# Patient Record
Sex: MALE | Race: WHITE | NOT HISPANIC OR LATINO
[De-identification: names, ages, dates, MRNs, and addresses within clinical notes are randomized per-mention and may not be internally consistent; named-entity substitution may affect disease eponyms.]

---

## 2017-01-10 ENCOUNTER — APPOINTMENT (OUTPATIENT)
Dept: CARDIOLOGY | Facility: CLINIC | Age: 57
End: 2017-01-10

## 2017-01-10 ENCOUNTER — APPOINTMENT (OUTPATIENT)
Dept: INTERNAL MEDICINE | Facility: CLINIC | Age: 57
End: 2017-01-10

## 2017-01-10 VITALS
TEMPERATURE: 97.5 F | OXYGEN SATURATION: 98 % | WEIGHT: 246 LBS | DIASTOLIC BLOOD PRESSURE: 81 MMHG | HEIGHT: 71.5 IN | SYSTOLIC BLOOD PRESSURE: 121 MMHG | HEART RATE: 93 BPM | RESPIRATION RATE: 12 BRPM | BODY MASS INDEX: 33.69 KG/M2

## 2017-01-10 VITALS — SYSTOLIC BLOOD PRESSURE: 110 MMHG | DIASTOLIC BLOOD PRESSURE: 60 MMHG

## 2017-01-10 DIAGNOSIS — Z00.00 ENCOUNTER FOR GENERAL ADULT MEDICAL EXAMINATION W/OUT ABNORMAL FINDINGS: ICD-10-CM

## 2017-01-10 DIAGNOSIS — Z91.89 OTHER SPECIFIED PERSONAL RISK FACTORS, NOT ELSEWHERE CLASSIFIED: ICD-10-CM

## 2017-01-11 LAB
25(OH)D3 SERPL-MCNC: 10.8 NG/ML
GLUCOSE BS SERPL-MCNC: 124 MG/DL
HBA1C MFR BLD HPLC: 7.1 %
TSH SERPL-ACNC: 2.14 UIU/ML

## 2017-01-18 LAB
PSA FREE FLD-MCNC: 23.1 %
PSA FREE SERPL-MCNC: 0.23 NG/ML
PSA SERPL-MCNC: 0.99 NG/ML

## 2017-02-21 ENCOUNTER — APPOINTMENT (OUTPATIENT)
Age: 57
End: 2017-02-21

## 2017-02-21 VITALS
HEART RATE: 98 BPM | WEIGHT: 247 LBS | HEIGHT: 71.5 IN | OXYGEN SATURATION: 97 % | TEMPERATURE: 98.5 F | DIASTOLIC BLOOD PRESSURE: 81 MMHG | SYSTOLIC BLOOD PRESSURE: 117 MMHG | BODY MASS INDEX: 33.82 KG/M2 | RESPIRATION RATE: 12 BRPM

## 2017-02-21 DIAGNOSIS — R94.31 ABNORMAL ELECTROCARDIOGRAM [ECG] [EKG]: ICD-10-CM

## 2017-02-21 DIAGNOSIS — E55.9 VITAMIN D DEFICIENCY, UNSPECIFIED: ICD-10-CM

## 2017-03-21 ENCOUNTER — APPOINTMENT (OUTPATIENT)
Dept: INTERNAL MEDICINE | Facility: CLINIC | Age: 57
End: 2017-03-21

## 2017-03-21 VITALS
TEMPERATURE: 98.3 F | OXYGEN SATURATION: 95 % | HEART RATE: 100 BPM | SYSTOLIC BLOOD PRESSURE: 123 MMHG | BODY MASS INDEX: 34.51 KG/M2 | RESPIRATION RATE: 12 BRPM | HEIGHT: 71.5 IN | WEIGHT: 252 LBS | DIASTOLIC BLOOD PRESSURE: 80 MMHG

## 2017-05-23 ENCOUNTER — APPOINTMENT (OUTPATIENT)
Dept: INTERNAL MEDICINE | Facility: CLINIC | Age: 57
End: 2017-05-23

## 2018-11-08 ENCOUNTER — APPOINTMENT (OUTPATIENT)
Dept: INTERNAL MEDICINE | Facility: CLINIC | Age: 58
End: 2018-11-08
Payer: COMMERCIAL

## 2018-11-08 VITALS
WEIGHT: 249 LBS | SYSTOLIC BLOOD PRESSURE: 120 MMHG | TEMPERATURE: 97.9 F | OXYGEN SATURATION: 94 % | DIASTOLIC BLOOD PRESSURE: 79 MMHG | BODY MASS INDEX: 34.1 KG/M2 | HEART RATE: 94 BPM | RESPIRATION RATE: 13 BRPM | HEIGHT: 71.5 IN

## 2018-11-08 DIAGNOSIS — R07.89 OTHER CHEST PAIN: ICD-10-CM

## 2018-11-08 PROCEDURE — 99213 OFFICE O/P EST LOW 20 MIN: CPT

## 2018-11-08 NOTE — PHYSICAL EXAM
[No Acute Distress] : no acute distress [Well Nourished] : well nourished [Well Developed] : well developed [Normal Sclera/Conjunctiva] : normal sclera/conjunctiva [PERRL] : pupils equal round and reactive to light [No JVD] : no jugular venous distention [No Respiratory Distress] : no respiratory distress  [Normal Rate] : normal rate  [Regular Rhythm] : with a regular rhythm [No Edema] : there was no peripheral edema [Soft] : abdomen soft [Non Tender] : non-tender

## 2018-11-08 NOTE — ASSESSMENT
[FreeTextEntry1] : ACUTE VISIT OF 58 Y OLD MALE WHO NEEDS TO RETURN TO WORK AFTER ATYPICAL CHEST PAIN= NOTE GIVEN\par DM= LABS TODAY RTO 1 M OR PRN

## 2018-11-08 NOTE — HISTORY OF PRESENT ILLNESS
[FreeTextEntry8] : PT HAD LEFT ARM PAIN 2 DAYS AGO WHILE AT WORK,SENT TO ER [facility] WHERE AFTER LABS AND EKG WAS SENT HOME ON NO RX= GLUCOSE THERE  .\par PT NEEDS NOTE TO RETURN TO WORK,FEELING FINE

## 2018-11-09 LAB
CHOLEST SERPL-MCNC: 190 MG/DL
CHOLEST/HDLC SERPL: 4.6 RATIO
FRUCTOSAMINE SERPL-MCNC: 262 UMOL/L
HBA1C MFR BLD HPLC: 7.8 %
HDLC SERPL-MCNC: 41 MG/DL
LDLC SERPL CALC-MCNC: 124 MG/DL
PSA FREE FLD-MCNC: 15.3
PSA FREE SERPL-MCNC: 0.27 NG/ML
PSA SERPL-MCNC: 1.77 NG/ML
TRIGL SERPL-MCNC: 124 MG/DL
TSH SERPL-ACNC: 1.72 UIU/ML

## 2018-12-13 ENCOUNTER — APPOINTMENT (OUTPATIENT)
Dept: INTERNAL MEDICINE | Facility: CLINIC | Age: 58
End: 2018-12-13
Payer: COMMERCIAL

## 2018-12-13 VITALS
WEIGHT: 248 LBS | SYSTOLIC BLOOD PRESSURE: 121 MMHG | BODY MASS INDEX: 33.96 KG/M2 | OXYGEN SATURATION: 97 % | RESPIRATION RATE: 13 BRPM | DIASTOLIC BLOOD PRESSURE: 83 MMHG | TEMPERATURE: 97.7 F | HEIGHT: 71.5 IN | HEART RATE: 87 BPM

## 2018-12-13 PROCEDURE — 99214 OFFICE O/P EST MOD 30 MIN: CPT

## 2018-12-13 NOTE — ASSESSMENT
[FreeTextEntry1] : F/U VISIT OF 58 Y OLD MALE WITH HX OF DM ,NEVER ON MEDS ,HBA1C OF 7.8= START METFORMIN 500 MG BID ,REFER TO SEE OPHTHALMOLOGY AND PODIATRY\par RECOMM TO SEE DIETICIAN\par RTO IN 3 M OR PRN\par SIDE EFFECTS AND GOALS OF THERAPY EXPLAINED

## 2022-02-25 ENCOUNTER — APPOINTMENT (OUTPATIENT)
Dept: INTERNAL MEDICINE | Facility: CLINIC | Age: 62
End: 2022-02-25

## 2022-07-26 ENCOUNTER — APPOINTMENT (OUTPATIENT)
Dept: INTERNAL MEDICINE | Facility: CLINIC | Age: 62
End: 2022-07-26

## 2022-10-31 ENCOUNTER — INPATIENT (INPATIENT)
Facility: HOSPITAL | Age: 62
LOS: 45 days | Discharge: HOME | End: 2022-12-16
Attending: PSYCHIATRY & NEUROLOGY | Admitting: PSYCHIATRY & NEUROLOGY
Payer: COMMERCIAL

## 2022-10-31 VITALS
OXYGEN SATURATION: 99 % | RESPIRATION RATE: 18 BRPM | SYSTOLIC BLOOD PRESSURE: 138 MMHG | DIASTOLIC BLOOD PRESSURE: 76 MMHG | TEMPERATURE: 98 F | HEART RATE: 98 BPM

## 2022-10-31 DIAGNOSIS — F31.2 BIPOLAR DISORDER, CURRENT EPISODE MANIC SEVERE WITH PSYCHOTIC FEATURES: ICD-10-CM

## 2022-10-31 LAB
ALBUMIN SERPL ELPH-MCNC: 3.9 G/DL — SIGNIFICANT CHANGE UP (ref 3.3–5)
ALP SERPL-CCNC: 99 U/L — SIGNIFICANT CHANGE UP (ref 40–120)
ALT FLD-CCNC: 22 U/L — SIGNIFICANT CHANGE UP (ref 4–41)
ANION GAP SERPL CALC-SCNC: 9 MMOL/L — SIGNIFICANT CHANGE UP (ref 7–14)
APAP SERPL-MCNC: <10 UG/ML — LOW (ref 15–25)
APPEARANCE UR: CLEAR — SIGNIFICANT CHANGE UP
AST SERPL-CCNC: 23 U/L — SIGNIFICANT CHANGE UP (ref 4–40)
BASOPHILS # BLD AUTO: 0.04 K/UL — SIGNIFICANT CHANGE UP (ref 0–0.2)
BASOPHILS NFR BLD AUTO: 0.5 % — SIGNIFICANT CHANGE UP (ref 0–2)
BILIRUB SERPL-MCNC: 0.4 MG/DL — SIGNIFICANT CHANGE UP (ref 0.2–1.2)
BILIRUB UR-MCNC: NEGATIVE — SIGNIFICANT CHANGE UP
BUN SERPL-MCNC: 20 MG/DL — SIGNIFICANT CHANGE UP (ref 7–23)
CALCIUM SERPL-MCNC: 9 MG/DL — SIGNIFICANT CHANGE UP (ref 8.4–10.5)
CHLORIDE SERPL-SCNC: 105 MMOL/L — SIGNIFICANT CHANGE UP (ref 98–107)
CO2 SERPL-SCNC: 28 MMOL/L — SIGNIFICANT CHANGE UP (ref 22–31)
COLOR SPEC: YELLOW — SIGNIFICANT CHANGE UP
CREAT SERPL-MCNC: 0.84 MG/DL — SIGNIFICANT CHANGE UP (ref 0.5–1.3)
DIFF PNL FLD: NEGATIVE — SIGNIFICANT CHANGE UP
EGFR: 99 ML/MIN/1.73M2 — SIGNIFICANT CHANGE UP
EOSINOPHIL # BLD AUTO: 0.16 K/UL — SIGNIFICANT CHANGE UP (ref 0–0.5)
EOSINOPHIL NFR BLD AUTO: 2 % — SIGNIFICANT CHANGE UP (ref 0–6)
ETHANOL SERPL-MCNC: <10 MG/DL — SIGNIFICANT CHANGE UP
GLUCOSE SERPL-MCNC: 111 MG/DL — HIGH (ref 70–99)
GLUCOSE UR QL: ABNORMAL
HCT VFR BLD CALC: 39.7 % — SIGNIFICANT CHANGE UP (ref 39–50)
HGB BLD-MCNC: 13 G/DL — SIGNIFICANT CHANGE UP (ref 13–17)
IANC: 5.04 K/UL — SIGNIFICANT CHANGE UP (ref 1.8–7.4)
IMM GRANULOCYTES NFR BLD AUTO: 0.3 % — SIGNIFICANT CHANGE UP (ref 0–0.9)
KETONES UR-MCNC: ABNORMAL
LEUKOCYTE ESTERASE UR-ACNC: NEGATIVE — SIGNIFICANT CHANGE UP
LYMPHOCYTES # BLD AUTO: 2.01 K/UL — SIGNIFICANT CHANGE UP (ref 1–3.3)
LYMPHOCYTES # BLD AUTO: 25.3 % — SIGNIFICANT CHANGE UP (ref 13–44)
MCHC RBC-ENTMCNC: 30.2 PG — SIGNIFICANT CHANGE UP (ref 27–34)
MCHC RBC-ENTMCNC: 32.7 GM/DL — SIGNIFICANT CHANGE UP (ref 32–36)
MCV RBC AUTO: 92.3 FL — SIGNIFICANT CHANGE UP (ref 80–100)
MONOCYTES # BLD AUTO: 0.66 K/UL — SIGNIFICANT CHANGE UP (ref 0–0.9)
MONOCYTES NFR BLD AUTO: 8.3 % — SIGNIFICANT CHANGE UP (ref 2–14)
NEUTROPHILS # BLD AUTO: 5.04 K/UL — SIGNIFICANT CHANGE UP (ref 1.8–7.4)
NEUTROPHILS NFR BLD AUTO: 63.6 % — SIGNIFICANT CHANGE UP (ref 43–77)
NITRITE UR-MCNC: NEGATIVE — SIGNIFICANT CHANGE UP
NRBC # BLD: 0 /100 WBCS — SIGNIFICANT CHANGE UP (ref 0–0)
NRBC # FLD: 0 K/UL — SIGNIFICANT CHANGE UP (ref 0–0)
PH UR: 6 — SIGNIFICANT CHANGE UP (ref 5–8)
PLATELET # BLD AUTO: 273 K/UL — SIGNIFICANT CHANGE UP (ref 150–400)
POTASSIUM SERPL-MCNC: 3.6 MMOL/L — SIGNIFICANT CHANGE UP (ref 3.5–5.3)
POTASSIUM SERPL-SCNC: 3.6 MMOL/L — SIGNIFICANT CHANGE UP (ref 3.5–5.3)
PROT SERPL-MCNC: 7 G/DL — SIGNIFICANT CHANGE UP (ref 6–8.3)
PROT UR-MCNC: ABNORMAL
RBC # BLD: 4.3 M/UL — SIGNIFICANT CHANGE UP (ref 4.2–5.8)
RBC # FLD: 14.4 % — SIGNIFICANT CHANGE UP (ref 10.3–14.5)
SALICYLATES SERPL-MCNC: <0.3 MG/DL — LOW (ref 15–30)
SODIUM SERPL-SCNC: 142 MMOL/L — SIGNIFICANT CHANGE UP (ref 135–145)
SP GR SPEC: 1.04 — SIGNIFICANT CHANGE UP (ref 1.01–1.05)
TSH SERPL-MCNC: 1.42 UIU/ML — SIGNIFICANT CHANGE UP (ref 0.27–4.2)
UROBILINOGEN FLD QL: ABNORMAL
WBC # BLD: 7.93 K/UL — SIGNIFICANT CHANGE UP (ref 3.8–10.5)
WBC # FLD AUTO: 7.93 K/UL — SIGNIFICANT CHANGE UP (ref 3.8–10.5)

## 2022-10-31 PROCEDURE — 99285 EMERGENCY DEPT VISIT HI MDM: CPT | Mod: GC

## 2022-10-31 PROCEDURE — 70450 CT HEAD/BRAIN W/O DYE: CPT | Mod: 26,MA

## 2022-10-31 PROCEDURE — 93010 ELECTROCARDIOGRAM REPORT: CPT

## 2022-10-31 PROCEDURE — 99285 EMERGENCY DEPT VISIT HI MDM: CPT

## 2022-10-31 RX ORDER — DIPHENHYDRAMINE HCL 50 MG
50 CAPSULE ORAL ONCE
Refills: 0 | Status: DISCONTINUED | OUTPATIENT
Start: 2022-11-01 | End: 2022-12-16

## 2022-10-31 RX ORDER — DIVALPROEX SODIUM 500 MG/1
1000 TABLET, DELAYED RELEASE ORAL AT BEDTIME
Refills: 0 | Status: DISCONTINUED | OUTPATIENT
Start: 2022-11-01 | End: 2022-11-02

## 2022-10-31 RX ORDER — HALOPERIDOL DECANOATE 100 MG/ML
5 INJECTION INTRAMUSCULAR ONCE
Refills: 0 | Status: DISCONTINUED | OUTPATIENT
Start: 2022-11-01 | End: 2022-12-16

## 2022-10-31 RX ORDER — DIPHENHYDRAMINE HCL 50 MG
50 CAPSULE ORAL EVERY 6 HOURS
Refills: 0 | Status: DISCONTINUED | OUTPATIENT
Start: 2022-11-01 | End: 2022-12-16

## 2022-10-31 RX ORDER — ACETAMINOPHEN 500 MG
650 TABLET ORAL EVERY 6 HOURS
Refills: 0 | Status: DISCONTINUED | OUTPATIENT
Start: 2022-11-01 | End: 2022-11-17

## 2022-10-31 RX ORDER — HALOPERIDOL DECANOATE 100 MG/ML
5 INJECTION INTRAMUSCULAR EVERY 6 HOURS
Refills: 0 | Status: DISCONTINUED | OUTPATIENT
Start: 2022-11-01 | End: 2022-12-16

## 2022-10-31 NOTE — ED BEHAVIORAL HEALTH ASSESSMENT NOTE - OTHER
Pt reports private residence, however he is not reliable easily irritable prolonged intense staring [place] prospect of being homeless, unemployment, financial constraints dishmarkd easily irritable, guarded, suspicious
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
## 2022-10-31 NOTE — ED ADULT NURSE NOTE - OBJECTIVE STATEMENT
63 y/o M received to room 9 with multiple medical complaints. Pt a&ox4 and amb. Pt states he was driving last week developed foot numbness and now has a rash on his b/l Feet. Pt also endorsing recent loss in job and is requesting psych clearance. Pt c/o rash in groin area as well. Pt respirations even and unlabored. pt abd soft nontender nondistended. Pt skin intact. Pt noted to have edema and redness to b/l extremities.  no rash noted in groin area. Pt noted to have flat affect. VS as noted, call bell in reach, comfort measures provided, 20g iv placed L ac, labs drawn and sent, will continue to monitor.

## 2022-10-31 NOTE — ED BEHAVIORAL HEALTH ASSESSMENT NOTE - MEDICAL ISSUES AND PLAN (INCLUDE STANDING AND PRN MEDICATION)
denies standing meds, pending medical clearance and hospitalist recommendations -- likely needs meds for T2DM, chronic pain

## 2022-10-31 NOTE — ED PROVIDER NOTE - OBJECTIVE STATEMENT
Patient is a 62y male with pmhx of diabetes  who presents to the ED with multiple complaints. Patient states he has been dealing with swelling of his legs and pain (pins and needles) in his leg for a long time. Patient is unable to stick to a timeline for most questions. States he needs to be medical and physiatric clearance. Patient believes he is cleared of "bipolar" and no longer requires tx. Denies any suicidal or homicidal ideations, denies any auditory or visual hallucinations.

## 2022-10-31 NOTE — ED ADULT NURSE REASSESSMENT NOTE - NS ED NURSE REASSESS COMMENT FT1
Pt received from main ED rm9, valuables secured at security. IV DC at this time. Steady gait, poor eye contact, pressured speech. Awaiting CT

## 2022-10-31 NOTE — ED PROVIDER NOTE - CLINICAL SUMMARY MEDICAL DECISION MAKING FREE TEXT BOX
Patient presents to the ED with multiple complaints. States he needs to be cleared medically and needs psychiatric clearance. Patient is not having any suicidal or homicidal ideations at this time. We will obtain basic labs to rule out any infectious pathologies. Psych will be consulted after medical clearance.

## 2022-10-31 NOTE — ED PROVIDER NOTE - PHYSICAL EXAMINATION
alert and oriented, suspicious  perrl, 2-12 intact,   neck supple, no jvd, no adenopathy  heart s1s2, lungs clear  abdomen, soft nontender, moist eerythema under pannus on right and in groin consistant with candida  extrem 1-2 + edema with some nonspecific erythema that appears chronic  pulses intact   severe fungal infection of both feet  patient has pain in back on change of position, but no neuro deficit, ambulating without difficulty

## 2022-10-31 NOTE — ED PROVIDER NOTE - NSFOLLOWUPINSTRUCTIONS_ED_ALL_ED_FT
nystatin lotion to toes and along abdomen under pannus.   Follow up with Dermatology if no improvement.
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
## 2022-10-31 NOTE — ED BEHAVIORAL HEALTH ASSESSMENT NOTE - RISK ASSESSMENT
Low Acute Suicide Risk Pt has static risk factors including male gender, bipolar 1 diagnosis, prior inpatient hospitalization, family hx of psychosis, and modifiable risks of unemployment, not in treatment, medication nonadherence, current artem, paranoia, disorganization, irritability, insomnia. Protective factors include no prior suicide attempts, no current SIIP or HIIP, future oriented. Pt meets criteria and would benefit from involuntary psychiatric admission given his inability to care for self due to gross disorganization in setting of treatment nonadherence.

## 2022-10-31 NOTE — ED BEHAVIORAL HEALTH ASSESSMENT NOTE - NSBHROSSYSTEMS_PSY_ALL_CORE
Genitourinary.../Musculoskeletal.../Psychiatric Genitourinary.../Musculoskeletal.../Skin.../Psychiatric

## 2022-10-31 NOTE — ED PROVIDER NOTE - ATTENDING CONTRIBUTION TO CARE
DR. BLOCH, ATTENDING MD-  I performed a face to face bedside interview with patient regarding history of present illness, review of symptoms and past medical history. I completed an independent physical exam.  I have discussed patient's plan of care with the resident.  Patient alert hypervigilant expressing some paranoid ideation HEENT normal heart sounds normal lungs clear abdomen soft skin with some candidal areas around the pannus and in groin mild extremities with 1+ edema with chronic venous stasis changes and some mild erythema pulses intact toes with fungal infection bilaterally severe no point tenderness. some pain in the back on change of position but no neurodeficit sensation and motor intact gait normal.

## 2022-10-31 NOTE — ED BEHAVIORAL HEALTH ASSESSMENT NOTE - SUMMARY
Eric Monae is a 62 year old male, domiciled alone, unemployed, , noncaregiver, PPHx with working diagnoses of: Bipolar 1 disorder, MRE manic and depressive disorder NOS, 1 prior psychiatric hospitalization at [facility] (10/23-11/26/2008), denies prior suicide attempts, not in outpt tx, denies hx of violence, denies hx of legal issues/prior arrests, denies substance use/prior withdrawal/DTs, PMH of T2DM, chronic back pain, brought in by self presenting with multiple medical complaints.    Current presentation is concerning for recurrent manic episode with psychosis in a patient with Bipolar 1 disorder hx. Pt is irritable with labile affect, easily distracted, upset with sensory changes, grandiose, hyperverbal and difficult to interrupt, disorganized and derails into loose associations, and active paranoid delusions. He reports difficulty taking care of himself in setting of medication nonadherence, recent unemployment, possibly housing insecurity; Pt reports living in [place] but appears very disheveled. He denies any suicidal or violent ideation, intent, or plan, feels safe on the unit. Pending medical clearance, Pt meets criteria and would benefit from involuntary psychiatric admission given his inability to care for self due to gross disorganization in setting of treatment nonadherence.

## 2022-10-31 NOTE — ED BEHAVIORAL HEALTH ASSESSMENT NOTE - DESCRIPTION
In the ED, Pt has been irritable but with appropriate impulse control. No PRNs for agitation.    Vital Signs Last 24 Hrs  T(C): 36.7 (31 Oct 2022 14:50), Max: 36.7 (31 Oct 2022 14:50)  T(F): 98 (31 Oct 2022 14:50), Max: 98 (31 Oct 2022 14:50)  HR: 98 (31 Oct 2022 14:50) (98 - 98)  BP: 138/76 (31 Oct 2022 14:50) (138/76 - 138/76)  BP(mean): --  RR: 18 (31 Oct 2022 14:50) (18 - 18)  SpO2: 99% (31 Oct 2022 14:50) (99% - 99%)    Parameters below as of 31 Oct 2022 14:50  Patient On (Oxygen Delivery Method): room air T2DM c/b neuropathic pain, chronic lower back pain, bilateral lower extremities cellulitis per HPI

## 2022-10-31 NOTE — ED BEHAVIORAL HEALTH NOTE - BEHAVIORAL HEALTH NOTE
Writer contacted JACINTA ANDREWS ([phone]) and spoke w/ vera. Patient was assigned to low 3.    Writer attempted to contact Patient’s sister Monty Spencer ([phone])  to obtain collateral  information. Phone number listed is for the patient.

## 2022-10-31 NOTE — ED BEHAVIORAL HEALTH ASSESSMENT NOTE - NSBHATTESTCOMMENTATTENDFT_PSY_A_CORE
62/M with hx of bipolar disorder, prior psych admission, hx of nonadherence to meds, no hx of SA and denied any illicit substance use.  Today, presented to the ED due to multiple somatic complaints.  psychiatry was consulted for evaluation of paranoia    at this time, Pt's current presentation is concerning for a manic episode with psychotic component in the context of non compliance to meds.  currently, he presents as disorganized in TP (FOI, tangential, impaired reasoning, illogical) with severe affective dysregulation.  he remains unpredictable; is delusional (paranoid/ grandiose); with poor insight and impaired judgement.  He does admit to finding it difficult towards taking care of himself.  current symptoms have caused severe functional impairment.  Pt meets criteria and would benefit from involuntary psychiatric admission given his inability to care for self due to gross disorganization in setting of treatment nonadherence.  in-Pt psych admission Is aimed at stabilization as well as ensuring safety

## 2022-10-31 NOTE — ED ADULT TRIAGE NOTE - CHIEF COMPLAINT QUOTE
Pt presents with multiple complaints. Verbose and difficult to keep on track regarding presenting symptoms. Pt states he was let go from his [employer] job for stress and anxiety. Pt states he needs to "comply" to gain employment again. Pt states that [employer] has his listed as bipolar, but states that it was all "cleared up". Pt denies being on daily meds for medical or psychiatric diagnoses. Pt reports multiple physical complaints, including chronic back pain, difficulty urinating over the past 4 months, now getting worse, as well as swelling and sores to both ankles with pins and needles sensation "for a while". Denies abd pain, denies chest pain, denies SOB.

## 2022-10-31 NOTE — ED BEHAVIORAL HEALTH ASSESSMENT NOTE - PAST PSYCHOTROPIC MEDICATION
placed on Buspar 10mg TID back in 10/2008 for depression - prescribed by his PCP, Dr Hagan   MEDS ON DISCHARGE AT [facility] BACK IN 11/2008: Zyprexa 30mg daily, Klonopin 1mg BID, VPA 1500mg HS, Ambien 5mg HS PRN, Sulindac 200mg q6Hrs PRN and Colace 100mg BID.

## 2022-10-31 NOTE — ED PROVIDER NOTE - NS ED ROS FT
CONSTITUTIONAL: No fevers, no chills, no lightheadedness, no dizziness  EYES: no visual changes, no eye pain  MOUTH/THROAT: no sore throat  CV: No chest pain, no palpitations  RESP: No SOB, no cough  GI: No n/v/d, no abd pain  : no dysuria, no hematuria, no flank pain  MSK: + back pain, + bilateral lower extremity pain  SKIN: + lower ext cellulitis (chronic)  NEURO: no headache, no focal weakness, no decreased sensation/parasthesias   PSYCHIATRIC: depression, anxiety

## 2022-11-01 ENCOUNTER — APPOINTMENT (OUTPATIENT)
Dept: INTERNAL MEDICINE | Facility: CLINIC | Age: 62
End: 2022-11-01

## 2022-11-01 DIAGNOSIS — E11.9 TYPE 2 DIABETES MELLITUS WITHOUT COMPLICATIONS: ICD-10-CM

## 2022-11-01 DIAGNOSIS — M54.9 DORSALGIA, UNSPECIFIED: ICD-10-CM

## 2022-11-01 DIAGNOSIS — F22 DELUSIONAL DISORDERS: ICD-10-CM

## 2022-11-01 LAB
CULTURE RESULTS: SIGNIFICANT CHANGE UP
FLUAV AG NPH QL: SIGNIFICANT CHANGE UP
FLUBV AG NPH QL: SIGNIFICANT CHANGE UP
RSV RNA NPH QL NAA+NON-PROBE: SIGNIFICANT CHANGE UP
SARS-COV-2 RNA SPEC QL NAA+PROBE: SIGNIFICANT CHANGE UP
SPECIMEN SOURCE: SIGNIFICANT CHANGE UP

## 2022-11-01 PROCEDURE — 99223 1ST HOSP IP/OBS HIGH 75: CPT

## 2022-11-01 RX ORDER — MAGNESIUM HYDROXIDE 400 MG/1
30 TABLET, CHEWABLE ORAL ONCE
Refills: 0 | Status: COMPLETED | OUTPATIENT
Start: 2022-11-01 | End: 2022-11-01

## 2022-11-01 RX ADMIN — DIVALPROEX SODIUM 1000 MILLIGRAM(S): 500 TABLET, DELAYED RELEASE ORAL at 20:35

## 2022-11-01 RX ADMIN — MAGNESIUM HYDROXIDE 30 MILLILITER(S): 400 TABLET, CHEWABLE ORAL at 20:56

## 2022-11-01 NOTE — BH INPATIENT PSYCHIATRY ASSESSMENT NOTE - RISK ASSESSMENT
Pt has static risk factors including male gender, bipolar 1 diagnosis, prior inpatient hospitalization, family hx of psychosis, and modifiable risks of unemployment, not in treatment, medication nonadherence, current artem, paranoia, disorganization, irritability, insomnia. Protective factors include no prior suicide attempts, no current SIIP or HIIP, future oriented. Pt meets criteria and would benefit from involuntary psychiatric admission given his inability to care for self due to gross disorganization in setting of treatment nonadherence.

## 2022-11-01 NOTE — BH INPATIENT PSYCHIATRY ASSESSMENT NOTE - NSBHMETABOLIC_PSY_ALL_CORE_FT
BMI:   HbA1c:   Glucose: POCT Blood Glucose.: 145 mg/dL (10-31-22 @ 14:58)    BP: 135/71 (10-31-22 @ 23:57) (130/83 - 138/76)  Lipid Panel:

## 2022-11-01 NOTE — BH INPATIENT PSYCHIATRY ASSESSMENT NOTE - CURRENT MEDICATION
MEDICATIONS  (STANDING):  diVALproex ER 1000 milliGRAM(s) Oral at bedtime    MEDICATIONS  (PRN):  acetaminophen     Tablet .. 650 milliGRAM(s) Oral every 6 hours PRN Mild Pain (1 - 3), Moderate Pain (4 - 6)  diphenhydrAMINE 50 milliGRAM(s) Oral every 6 hours PRN agitation  diphenhydrAMINE Injectable 50 milliGRAM(s) IntraMuscular once PRN agitation  haloperidol     Tablet 5 milliGRAM(s) Oral every 6 hours PRN agitation  haloperidol    Injectable 5 milliGRAM(s) IntraMuscular once PRN Agitation  LORazepam     Tablet 2 milliGRAM(s) Oral every 6 hours PRN Agitation  LORazepam   Injectable 2 milliGRAM(s) IntraMuscular Once PRN Agitation

## 2022-11-01 NOTE — BH INPATIENT PSYCHIATRY ASSESSMENT NOTE - NSBHASSESSSUMMFT_PSY_ALL_CORE
Patient is a 62 year old male, domiciled alone, unemployed, , noncaregiver, PPHx with working diagnoses of: Bipolar 1 disorder, MRE manic and depressive disorder NOS, 1 prior psychiatric hospitalization at [facility] (10/23-11/26/2008), denies prior suicide attempts, not in outpt tx, denies hx of violence, denies hx of legal issues/prior arrests, denies substance use/prior withdrawal/DTs, PMH of T2DM, chronic back pain, +family history, +hx treatment response to depakote and lithium brought in by self presenting with multiple medical complaints found to be manic, irritable and disorganized.  He was admitted on a 939 legal status.    1. Continue 939 hospitalization for safety and stabilization  2. haldol, ativan and benadryl prns  3. continue depakote 1000mg at bedtime for now  4. routine checks appropriate (can make needs known)  5. hx dm2--follow up a1c; will monitor for hyeprlipidema  6. back pain--otc meds.
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
## 2022-11-01 NOTE — BH INPATIENT PSYCHIATRY ASSESSMENT NOTE - NSBHCHARTREVIEWVS_PSY_A_CORE FT
Vital Signs Last 24 Hrs  T(C): 36.7 (11-01-22 @ 03:42), Max: 36.8 (10-31-22 @ 23:57)  T(F): 98.1 (11-01-22 @ 03:42), Max: 98.2 (10-31-22 @ 23:57)  HR: 85 (10-31-22 @ 23:57) (79 - 98)  BP: 135/71 (10-31-22 @ 23:57) (130/83 - 138/76)  BP(mean): --  RR: 16 (10-31-22 @ 23:57) (16 - 19)  SpO2: 100% (10-31-22 @ 23:57) (99% - 100%)    Orthostatic VS  11-01-22 @ 03:42  Lying BP: --/-- HR: --  Sitting BP: 116/77 HR: 110  Standing BP: 116/83 HR: 112  Site: --  Mode: --

## 2022-11-01 NOTE — BH PATIENT PROFILE - FALL HARM RISK - UNIVERSAL INTERVENTIONS
Bed in lowest position, wheels locked, appropriate side rails in place/Call bell, personal items and telephone in reach/Instruct patient to call for assistance before getting out of bed or chair/Non-slip footwear when patient is out of bed/[place] to call system/Physically safe environment - no spills, clutter or unnecessary equipment/Purposeful Proactive Rounding/Room/bathroom lighting operational, light cord in reach

## 2022-11-01 NOTE — ED ADULT NURSE REASSESSMENT NOTE - NS ED NURSE REASSESS COMMENT FT1
Per RN on LOW 3, pts belongings kept in [facility] security and yellow receipts sent to Low 3. Copies of receipts retained in chart.

## 2022-11-01 NOTE — ED ADULT NURSE REASSESSMENT NOTE - NS ED NURSE REASSESS COMMENT FT1
RN Rounding Note 12:30am- Pt received at 12AM shift change, pt sleeping with even and non labored respirations; pt awaiting medical clearance.

## 2022-11-01 NOTE — PSYCHIATRIC REHAB INITIAL EVALUATION - NSBHPRRECOMMEND_PSY_ALL_CORE
Writer met with pt. to orient him to psych rehab staff and services. Pt. is a 62-year-old male who is admitted for manic episodes. Pt. was hyper verbal, tangential, and illogical. Pt. was unable to answer any questions the writer presented and was challenging to redirect and set boundaries with. Pt. was talking about “Mr. Murray” and called writer “Princess Sage”. Throughout the session the pt. was not a reliable historian, was not receptive and was hyper verbal. Writer established a goal for pt. to work on over the next 7 days.

## 2022-11-01 NOTE — BH PATIENT PROFILE - STATED REASON FOR ADMISSION
61YO male admitted by self with dx of Bipolar DO.  Sxs include irritability, internal preoccupation, poor sleep, going to the bathroom often, being easily distracted.  Presents to the ED as disorganized and disheveled.  PMH of type 2 diabetes, lower back pain, bilateral lower cellulitis

## 2022-11-02 LAB
A1C WITH ESTIMATED AVERAGE GLUCOSE RESULT: 6.4 % — HIGH (ref 4–5.6)
CHOLEST SERPL-MCNC: 146 MG/DL — SIGNIFICANT CHANGE UP
ESTIMATED AVERAGE GLUCOSE: 137 — SIGNIFICANT CHANGE UP
HDLC SERPL-MCNC: 42 MG/DL — SIGNIFICANT CHANGE UP
LIPID PNL WITH DIRECT LDL SERPL: 82 MG/DL — SIGNIFICANT CHANGE UP
NON HDL CHOLESTEROL: 104 MG/DL — SIGNIFICANT CHANGE UP
TRIGL SERPL-MCNC: 110 MG/DL — SIGNIFICANT CHANGE UP

## 2022-11-02 PROCEDURE — 99232 SBSQ HOSP IP/OBS MODERATE 35: CPT

## 2022-11-02 RX ORDER — SENNA PLUS 8.6 MG/1
2 TABLET ORAL AT BEDTIME
Refills: 0 | Status: DISCONTINUED | OUTPATIENT
Start: 2022-11-02 | End: 2022-11-16

## 2022-11-02 RX ORDER — DIVALPROEX SODIUM 500 MG/1
1500 TABLET, DELAYED RELEASE ORAL AT BEDTIME
Refills: 0 | Status: ACTIVE | OUTPATIENT
Start: 2022-11-02 | End: 2023-10-01

## 2022-11-02 RX ADMIN — DIVALPROEX SODIUM 1500 MILLIGRAM(S): 500 TABLET, DELAYED RELEASE ORAL at 21:01

## 2022-11-02 RX ADMIN — Medication 5 MILLIGRAM(S): at 01:37

## 2022-11-02 NOTE — BH INPATIENT PSYCHIATRY PROGRESS NOTE - NSBHASSESSSUMMFT_PSY_ALL_CORE
Patient is a 62 year old male, domiciled alone, unemployed, , noncaregiver, PPHx with working diagnoses of: Bipolar 1 disorder, MRE manic and depressive disorder NOS, 1 prior psychiatric hospitalization at [facility] (10/23-11/26/2008), denies prior suicide attempts, not in outpt tx, denies hx of violence, denies hx of legal issues/prior arrests, denies substance use/prior withdrawal/DTs, PMH of T2DM, chronic back pain, +family history, +hx treatment response to depakote and lithium brought in by self presenting with multiple medical complaints found to be manic, irritable and disorganized.  He was admitted on a 939 legal status.  He has limited sleep, profuse and pressured verbal production, tangentiality/flight of ideas and an irritable labile dramatic affect.  He is unable to care for self.      1. Continue 939 hospitalization for safety and stabilization  2. haldol, ativan and benadryl prns  3. increase depakote to 1500mg at bedtime   4. routine checks appropriate (can make needs known)  5. hx dm2--follow up a1c; will monitor for hyeprlipidema  6. back pain--otc meds.

## 2022-11-02 NOTE — BH INPATIENT PSYCHIATRY PROGRESS NOTE - NSBHFUPINTERVALHXFT_PSY_A_CORE
Staff report compliant with medications, no psychotropic prns.  They relate limited sleep [id number] and the briefly from 0515 until a bit later.  VSS.  Used milk of magnesia and biscadoyl prns.  They relate he is hyperverbal and irritable and in a group was unredirectable.  He has a desperate affect and wants to know how long people with talk with him.  He talks at length and in a disorganized form about his family, how his parents met, a family member's mental illness and losses and trauma. It is difficult to follow because it is disorganized and because at times he lapses into acting out events he is describing in an intense dramatic way.  He asks me to phone Adarsh and Jeanine Cordoba who he relates are friends and one of whom is a coworker.  Called and left a message at [phone].  He asks me to call his son Pierre at [phone].  No voice mailbox set up.

## 2022-11-02 NOTE — BH SOCIAL WORK INITIAL PSYCHOSOCIAL EVALUATION - OTHER PAST PSYCHIATRIC HISTORY (INCLUDE DETAILS REGARDING ONSET, COURSE OF ILLNESS, INPATIENT/OUTPATIENT TREATMENT)
Per EMR pt has a hx of bipolar affective d/o, artem, depression, anxiety, had 1 prior inpt admission @ [facility] in 2008, pt reports not currently connected to outpatient treatment. Per EMR pt has no hx of SA/SI, no marked hx of AH/VH however presents to ED with delusional paranoid thinking others are trying to harm him, pt has no hx of substance /ETOH abuse, currently complaining of poor concentration, poor self-care, poor sleep, worsening anxiety, pt has no hx of aggression or violent behavior, no legal hx, has a medical hx of DM2, cellulitis & chronic back pain  Per EMR pt has a hx of bipolar affective d/o, artem, depression, anxiety, had 1 prior inpt admission @ [facility] in 2008, pt reports not currently connected to outpatient treatment. Per EMR pt has no hx of SA/SI, no marked hx of AH/VH however presents to ED with delusional paranoid thinking others are trying to harm him, pt has no hx of substance /ETOH abuse, currently complaining of poor concentration, poor self-care, poor sleep, worsening anxiety, pt has no hx of aggression or violent behavior, no legal hx, has a medical hx of DM2, cellulitis & chronic back pain. Pt presents tangential, disorganized telling various stories about his ancestorial family members. Stated he worked at the [employer] for 38 years but also admitted he was homeless for 3 years.    Per EMR pt has a hx of bipolar affective d/o, artem, depression, anxiety, had 1 prior inpt admission @ [facility] in 2008, pt reports not currently connected to outpatient treatment. Per EMR pt has no hx of SA/SI, no marked hx of AH/VH however presents to ED with delusional paranoid thinking others are trying to harm him, pt has no hx of substance /ETOH abuse, currently complaining of poor concentration, poor self-care, poor sleep, worsening anxiety, pt has no hx of aggression or violent behavior, no legal hx, has a medical hx of DM2, cellulitis & chronic back pain. Pt presents tangential, disorganized telling various stories about his ancestorial family members. Stated he worked at the [employer] for 38 years and also admitted he was homeless for 3 years but made it work.

## 2022-11-02 NOTE — BH INPATIENT PSYCHIATRY PROGRESS NOTE - OTHER
no frankly bizarre delusional content but is preoccupied with thoughts about family profuse verbal production but can be irritable and suspicious

## 2022-11-02 NOTE — BH INPATIENT PSYCHIATRY PROGRESS NOTE - CURRENT MEDICATION
MEDICATIONS  (STANDING):  diVALproex ER 1500 milliGRAM(s) Oral at bedtime    MEDICATIONS  (PRN):  acetaminophen     Tablet .. 650 milliGRAM(s) Oral every 6 hours PRN Mild Pain (1 - 3), Moderate Pain (4 - 6)  diphenhydrAMINE 50 milliGRAM(s) Oral every 6 hours PRN agitation  diphenhydrAMINE Injectable 50 milliGRAM(s) IntraMuscular once PRN agitation  haloperidol     Tablet 5 milliGRAM(s) Oral every 6 hours PRN agitation  haloperidol    Injectable 5 milliGRAM(s) IntraMuscular once PRN Agitation  LORazepam     Tablet 2 milliGRAM(s) Oral every 6 hours PRN Agitation  LORazepam   Injectable 2 milliGRAM(s) IntraMuscular Once PRN Agitation

## 2022-11-03 PROCEDURE — 99232 SBSQ HOSP IP/OBS MODERATE 35: CPT

## 2022-11-03 RX ADMIN — DIVALPROEX SODIUM 1500 MILLIGRAM(S): 500 TABLET, DELAYED RELEASE ORAL at 20:49

## 2022-11-03 NOTE — BH INPATIENT PSYCHIATRY PROGRESS NOTE - OTHER
profuse verbal production but can be irritable and suspicious no frankly bizarre delusional content but is preoccupied with thoughts about family

## 2022-11-03 NOTE — BH INPATIENT PSYCHIATRY PROGRESS NOTE - NSBHFUPINTERVALHXFT_PSY_A_CORE
Staff report compliant with medications, no psychotropic prnsrefused senna and dulcolax after complaining of constipation later in the afternoon.  Staff reprot he slept but is profusely verbally productive.  He is irritqble and labile with me as he recounts further episodes from his family's history in a dramatic, pressured labile manner.  He does not talk about any somatic ailments that he shared with nurses ysterday.  He thinks we have met before.

## 2022-11-03 NOTE — BH INPATIENT PSYCHIATRY PROGRESS NOTE - CURRENT MEDICATION
MEDICATIONS  (STANDING):  diVALproex ER 1500 milliGRAM(s) Oral at bedtime  senna 2 Tablet(s) Oral at bedtime    MEDICATIONS  (PRN):  acetaminophen     Tablet .. 650 milliGRAM(s) Oral every 6 hours PRN Mild Pain (1 - 3), Moderate Pain (4 - 6)  diphenhydrAMINE 50 milliGRAM(s) Oral every 6 hours PRN agitation  diphenhydrAMINE Injectable 50 milliGRAM(s) IntraMuscular once PRN agitation  haloperidol     Tablet 5 milliGRAM(s) Oral every 6 hours PRN agitation  haloperidol    Injectable 5 milliGRAM(s) IntraMuscular once PRN Agitation  LORazepam     Tablet 2 milliGRAM(s) Oral every 6 hours PRN Agitation  LORazepam   Injectable 2 milliGRAM(s) IntraMuscular Once PRN Agitation

## 2022-11-03 NOTE — BH INPATIENT PSYCHIATRY PROGRESS NOTE - NSBHASSESSSUMMFT_PSY_ALL_CORE
Patient is a 62 year old male, domiciled alone, unemployed, , noncaregiver, PPHx with working diagnoses of: Bipolar 1 disorder, MRE manic and depressive disorder NOS, 1 prior psychiatric hospitalization at [facility] (10/23-11/26/2008), denies prior suicide attempts, not in outpt tx, denies hx of violence, denies hx of legal issues/prior arrests, denies substance use/prior withdrawal/DTs, PMH of T2DM, chronic back pain, +family history, +hx treatment response to depakote and lithium brought in by self presenting with multiple medical complaints found to be manic, irritable and disorganized.  He was admitted on a 939 legal status.  He has limited sleep, profuse and pressured verbal production, tangentiality/flight of ideas and an irritable labile dramatic affect.  He is unable to care for self.  He slept last night (11/2) after we increased depakote er to 1500mg at bedtime.  Tonight is night #2 of depakote er 1500mg at bedtime.    1. Continue 939 hospitalization for safety and stabilization  2. haldol, ativan and benadryl prns  3. depakote 1500mg at bedtime   4. routine checks appropriate (can make needs known)  5. hx dm2--follow up a1c; will monitor for hyeprlipidema  6. back pain--otc meds.

## 2022-11-04 PROCEDURE — 90853 GROUP PSYCHOTHERAPY: CPT

## 2022-11-04 RX ADMIN — Medication 650 MILLIGRAM(S): at 20:30

## 2022-11-04 RX ADMIN — DIVALPROEX SODIUM 1500 MILLIGRAM(S): 500 TABLET, DELAYED RELEASE ORAL at 20:27

## 2022-11-04 RX ADMIN — Medication 650 MILLIGRAM(S): at 21:31

## 2022-11-04 RX ADMIN — Medication 650 MILLIGRAM(S): at 00:50

## 2022-11-04 NOTE — BH INPATIENT PSYCHIATRY PROGRESS NOTE - NSBHASSESSSUMMFT_PSY_ALL_CORE
Patient is a 62 year old male, domiciled alone, unemployed, , noncaregiver, PPHx with working diagnoses of: Bipolar 1 disorder, MRE manic and depressive disorder NOS, 1 prior psychiatric hospitalization at [facility] (10/23-11/26/2008), denies prior suicide attempts, not in outpt tx, denies hx of violence, denies hx of legal issues/prior arrests, denies substance use/prior withdrawal/DTs, PMH of T2DM, chronic back pain, +family history, +hx treatment response to depakote and lithium brought in by self presenting with multiple medical complaints found to be manic, irritable and disorganized.  He was admitted on a 939 legal status.  He has limited sleep, profuse and pressured verbal production, tangentiality/flight of ideas and an irritable labile dramatic affect.  He is unable to care for self.  He slept last night (11/2) after we increased depakote er to 1500mg at bedtime.  Tonight is night #3 of depakote er 1500mg at bedtime.    1. Continue 939 hospitalization for safety and stabilization  2. haldol, ativan and benadryl prns  3. depakote 1500mg at bedtime   4. routine checks appropriate (can make needs known)  5. hx dm2--follow up a1c; will monitor for hyeprlipidema  6. back pain--otc meds.

## 2022-11-04 NOTE — BH INPATIENT PSYCHIATRY PROGRESS NOTE - NSBHFUPINTERVALHXFT_PSY_A_CORE
Staff report compliant [facility] slept after 0130.  Had been pacing halls.  Some somatic preoccupation, refused senna but complained of constipation.  Staff relate patient talking about trying to defecate "by trying something new".  On exam today he continues pressured and tangential but less irritable.

## 2022-11-05 PROCEDURE — 99232 SBSQ HOSP IP/OBS MODERATE 35: CPT

## 2022-11-05 RX ADMIN — DIVALPROEX SODIUM 1500 MILLIGRAM(S): 500 TABLET, DELAYED RELEASE ORAL at 20:31

## 2022-11-05 RX ADMIN — Medication 650 MILLIGRAM(S): at 21:34

## 2022-11-05 RX ADMIN — Medication 650 MILLIGRAM(S): at 20:34

## 2022-11-05 RX ADMIN — Medication 50 MILLIGRAM(S): at 01:11

## 2022-11-05 NOTE — BH INPATIENT PSYCHIATRY PROGRESS NOTE - NSBHASSESSSUMMFT_PSY_ALL_CORE
Patient is a 62 year old male, domiciled alone, unemployed, , noncaregiver, PPHx with working diagnoses of: Bipolar 1 disorder, MRE manic and depressive disorder NOS, 1 prior psychiatric hospitalization at [facility] (10/23-11/26/2008), denies prior suicide attempts, not in outpt tx, denies hx of violence, denies hx of legal issues/prior arrests, denies substance use/prior withdrawal/DTs, PMH of T2DM, chronic back pain, +family history, +hx treatment response to depakote and lithium brought in by self presenting with multiple medical complaints found to be manic, irritable and disorganized.  He was admitted on a 939 legal status.  He has limited sleep, profuse and pressured verbal production, tangentiality/flight of ideas and an irritable labile dramatic affect.  He is unable to care for self.  He slept last night (11/2) after we increased depakote er to 1500mg at bedtime.  Tonight is night #3 of depakote er 1500mg at bedtime.    on assessment, patient remains manic with flight of ideas, is thought disordered, paranoid , hyperverbal. Sleep improving, Denied SI/I/P.     1. Continue 939 hospitalization for safety and stabilization  2. haldol, ativan and benadryl prns  3. depakote 1500mg at bedtime   4. routine checks appropriate (can make needs known)  5. hx dm2--follow up a1c; will monitor for hyeprlipidema  6. back pain--otc meds.

## 2022-11-05 NOTE — BH INPATIENT PSYCHIATRY PROGRESS NOTE - OTHER
no frankly bizarre delusional content but is preoccupied with thoughts about family, grandiosity profuse verbal production less irritable "on and off".

## 2022-11-05 NOTE — BH INPATIENT PSYCHIATRY PROGRESS NOTE - NSBHFUPINTERVALHXFT_PSY_A_CORE
f/up artem w/ psychosis, VSS. Patient was hyperverbal, reported racing thoughts, easily distracted--focusing on the seats in the interview room and relating to his past. Patient was tangential with flight of ideas, with loose associations, reported that he used to work for the [employer]. Patient becomes irritable when writer interjects or redirects him. Patient reported sleeping better, however interrupted due to needing to use the bathroom. Patient with grandiose delusions, stating that he is security, guarding the unit. Patient also reported dreaming of future technologies of smart phones signaling your thoughts to a terminal. Patient with fair appetite. Denied SI/I/P.

## 2022-11-06 PROCEDURE — 99232 SBSQ HOSP IP/OBS MODERATE 35: CPT

## 2022-11-06 RX ORDER — LANOLIN ALCOHOL/MO/W.PET/CERES
5 CREAM (GRAM) TOPICAL ONCE
Refills: 0 | Status: COMPLETED | OUTPATIENT
Start: 2022-11-06 | End: 2022-11-06

## 2022-11-06 RX ORDER — LANOLIN ALCOHOL/MO/W.PET/CERES
5 CREAM (GRAM) TOPICAL AT BEDTIME
Refills: 0 | Status: DISCONTINUED | OUTPATIENT
Start: 2022-11-06 | End: 2022-12-16

## 2022-11-06 RX ORDER — RISPERIDONE 4 MG/1
1 TABLET ORAL AT BEDTIME
Refills: 0 | Status: ACTIVE | OUTPATIENT
Start: 2022-11-06 | End: 2023-10-05

## 2022-11-06 RX ADMIN — Medication 650 MILLIGRAM(S): at 21:51

## 2022-11-06 RX ADMIN — DIVALPROEX SODIUM 1500 MILLIGRAM(S): 500 TABLET, DELAYED RELEASE ORAL at 20:51

## 2022-11-06 RX ADMIN — Medication 5 MILLIGRAM(S): at 01:33

## 2022-11-06 RX ADMIN — RISPERIDONE 1 MILLIGRAM(S): 4 TABLET ORAL at 20:52

## 2022-11-06 RX ADMIN — Medication 650 MILLIGRAM(S): at 20:51

## 2022-11-06 NOTE — BH INPATIENT PSYCHIATRY PROGRESS NOTE - OTHER
profuse verbal production less irritable "on and off".  no frankly bizarre delusional content but is preoccupied with thoughts about family, grandiosity

## 2022-11-06 NOTE — BH INPATIENT PSYCHIATRY PROGRESS NOTE - CURRENT MEDICATION
MEDICATIONS  (STANDING):  diVALproex ER 1500 milliGRAM(s) Oral at bedtime  risperiDONE  Disintegrating Tablet 1 milliGRAM(s) Oral at bedtime  senna 2 Tablet(s) Oral at bedtime    MEDICATIONS  (PRN):  acetaminophen     Tablet .. 650 milliGRAM(s) Oral every 6 hours PRN Mild Pain (1 - 3), Moderate Pain (4 - 6)  diphenhydrAMINE 50 milliGRAM(s) Oral every 6 hours PRN agitation  diphenhydrAMINE Injectable 50 milliGRAM(s) IntraMuscular once PRN agitation  haloperidol     Tablet 5 milliGRAM(s) Oral every 6 hours PRN agitation  haloperidol    Injectable 5 milliGRAM(s) IntraMuscular once PRN Agitation  LORazepam     Tablet 2 milliGRAM(s) Oral every 6 hours PRN Agitation  LORazepam   Injectable 2 milliGRAM(s) IntraMuscular Once PRN Agitation  melatonin. 5 milliGRAM(s) Oral at bedtime PRN Insomnia

## 2022-11-06 NOTE — BH INPATIENT PSYCHIATRY PROGRESS NOTE - NSBHFUPINTERVALHXFT_PSY_A_CORE
f/up artem w/ psychosis, VSS. Patient was hyperverbal, racing thoughts,  pressured speech, irritable when interrupted, tangential with flight of ideas, with loose associations, as well as referential delusions (writer is talking / saying which remind him of people in his past).  Patient reported sleeping better. Patient is grandiose as well, reporting having extraordinary abilities. Patient working up self to an anxious level. Patient with fair appetite. Denied SI/I/P. WRiter discussed starting Risperdal, to which he agreed. REported being on Risperdal and abilify in the past and then associating what writer is saying to his provider Dr Dunaway. Denied SE to meds.

## 2022-11-06 NOTE — BH INPATIENT PSYCHIATRY PROGRESS NOTE - NSBHASSESSSUMMFT_PSY_ALL_CORE
Patient is a 62 year old male, domiciled alone, unemployed, , noncaregiver, PPHx with working diagnoses of: Bipolar 1 disorder, MRE manic and depressive disorder NOS, 1 prior psychiatric hospitalization at [facility] (10/23-11/26/2008), denies prior suicide attempts, not in outpt tx, denies hx of violence, denies hx of legal issues/prior arrests, denies substance use/prior withdrawal/DTs, PMH of T2DM, chronic back pain, +family history, +hx treatment response to depakote and lithium brought in by self presenting with multiple medical complaints found to be manic, irritable and disorganized.  He was admitted on a 939 legal status.  He has limited sleep, profuse and pressured verbal production, tangentiality/flight of ideas and an irritable labile dramatic affect.  He is unable to care for self.  He slept last night (11/2) after we increased depakote er to 1500mg at bedtime.  Tonight is night #3 of depakote er 1500mg at bedtime.    on assessment, patient remains manic with flight of ideas, is thought disordered, paranoid, with referential delusions, hyperverbal. Sleep improving, Denied SI/I/P.     1. Continue 939 hospitalization for safety and stabilization  2. haldol, ativan and benadryl prns  3. depakote 1500mg at bedtime , add Risperdal 1mg hs  4. routine checks appropriate (can make needs known)  5. hx dm2--follow up a1c; will monitor for hyeprlipidema  6. back pain--otc meds.

## 2022-11-07 RX ADMIN — DIVALPROEX SODIUM 1500 MILLIGRAM(S): 500 TABLET, DELAYED RELEASE ORAL at 20:34

## 2022-11-07 RX ADMIN — RISPERIDONE 1 MILLIGRAM(S): 4 TABLET ORAL at 20:34

## 2022-11-07 RX ADMIN — Medication 650 MILLIGRAM(S): at 03:10

## 2022-11-07 NOTE — BH INPATIENT PSYCHIATRY PROGRESS NOTE - NSBHASSESSSUMMFT_PSY_ALL_CORE
Patient is a 62 year old male, domiciled alone, unemployed, , noncaregiver, PPHx with working diagnoses of: Bipolar 1 disorder, MRE manic and depressive disorder NOS, 1 prior psychiatric hospitalization at [facility] (10/23-11/26/2008), denies prior suicide attempts, not in outpt tx, denies hx of violence, denies hx of legal issues/prior arrests, denies substance use/prior withdrawal/DTs, PMH of T2DM, chronic back pain, +family history, +hx treatment response to depakote and lithium brought in by self presenting with multiple medical complaints found to be manic, irritable and disorganized.  He was admitted on a 939 legal status.  He has limited sleep, profuse and pressured verbal production, tangentiality/flight of ideas and an irritable labile dramatic affect.  He is unable to care for self.  He slept last night (11/2) after we increased depakote er to 1500mg at bedtime.  Tonight is night #3 of depakote er 1500mg at bedtime.    on assessment, patient remains manic with flight of ideas, is thought disordered, paranoid, with referential delusions, hyperverbal. Sleep improving, Denied SI/I/P.     Patient is seen today on 11/7/22. Reports he is feeling better and sleeps well. His speech is disorganized. He is seen having conversations in the hallway at all times.    1. Continue 939 hospitalization for safety and stabilization  2. haldol, ativan and benadryl prns  3. depakote 1500mg at bedtime , add Risperdal 1mg hs  4. routine checks appropriate (can make needs known)  5. hx dm2--follow up a1c; will monitor for hyeprlipidema  6. back pain--otc meds. Patient is a 62 year old male, domiciled alone, unemployed, , noncaregiver, PPHx with working diagnoses of: Bipolar 1 disorder, MRE manic and depressive disorder NOS, 1 prior psychiatric hospitalization at [facility] (10/23-11/26/2008), denies prior suicide attempts, not in outpt tx, denies hx of violence, denies hx of legal issues/prior arrests, denies substance use/prior withdrawal/DTs, PMH of T2DM, chronic back pain, +family history, +hx treatment response to depakote and lithium brought in by self presenting with multiple medical complaints found to be manic, irritable and disorganized.  He was admitted on a 939 legal status.  He has limited sleep, profuse and pressured verbal production, tangentiality/flight of ideas and an irritable labile dramatic affect.  He is unable to care for self.  He slept last night (11/2) after we increased depakote er to 1500mg at bedtime.  Tonight is night #3 of depakote er 1500mg at bedtime.    on assessment, patient remains manic with flight of ideas, is thought disordered, paranoid, with referential delusions, hyperverbal. Sleep improving, Denied SI/I/P.     Patient is seen today on 11/7/22. Reports he is feeling better and sleeps well. His thoughts continues to be grossly disorganized. He is seen having conversations in the hallway at all times.    1. Continue 939 hospitalization for safety and stabilization  2. haldol, ativan and benadryl prns  3. depakote 1500mg at bedtime , continue Risperdal 1mg hs  4. routine checks appropriate (can make needs known)  5. hx dm2--follow up a1c; will monitor for hyeprlipidema  6. back pain--otc meds.

## 2022-11-07 NOTE — BH INPATIENT PSYCHIATRY PROGRESS NOTE - NSBHFUPINTERVALHXFT_PSY_A_CORE
f/up artem w/ psychosis, VSS. Patient was hyperverbal, racing thoughts,  pressured speech, irritable when interrupted, tangential with flight of ideas, with loose associations, as well as referential delusions (writer is talking / saying which remind him of people in his past).  Patient reported sleeping better. Patient is grandiose as well, reporting having extraordinary abilities. Patient working up self to an anxious level. Patient with fair appetite. Denied SI/I/P. WRiter discussed starting Risperdal, to which he agreed. Reported being on Risperdal and abilify in the past and then associating what writer is saying to his provider Dr Dunaway. Denied SE to meds.     Patient is seen making coffee and is a agreeable to a conversation in the interview room. Speech is tangential with details of patient's family. Reports he is feeling better, eats and sleep well f/up artem w/ psychosis, VSS. Patient continues to be hyperverbal, with racing thoughts, pressured speech, irritable when interrupted, tangential with flight of ideas, with loose associations.  Patient had difficulty answering questions appropriately, needing frequent redirection.  Patient reported sleeping better overall. Patient remains grandiose. Patient with fair appetite. Denied SI/I/P.  He has been compliant with medications, denies side effects.  Agreeable to bloodwork in am.

## 2022-11-07 NOTE — BH INPATIENT PSYCHIATRY PROGRESS NOTE - NSBHATTESTCOMMENTATTENDFT_PSY_A_CORE
The patient continues to be grossly disorganized, irritable at times on the unit, but overall behavior well controlled.  He has been tolerating medications well, will continue VPA and Risperdal which was just started.  Will check VPA level in am.  Look to slowly increase Risperdal as needed.

## 2022-11-08 LAB
ALBUMIN SERPL ELPH-MCNC: 4.3 G/DL — SIGNIFICANT CHANGE UP (ref 3.3–5)
ALP SERPL-CCNC: 102 U/L — SIGNIFICANT CHANGE UP (ref 40–120)
ALT FLD-CCNC: 22 U/L — SIGNIFICANT CHANGE UP (ref 4–41)
ANION GAP SERPL CALC-SCNC: 12 MMOL/L — SIGNIFICANT CHANGE UP (ref 7–14)
AST SERPL-CCNC: 22 U/L — SIGNIFICANT CHANGE UP (ref 4–40)
BASOPHILS # BLD AUTO: 0.04 K/UL — SIGNIFICANT CHANGE UP (ref 0–0.2)
BASOPHILS NFR BLD AUTO: 0.4 % — SIGNIFICANT CHANGE UP (ref 0–2)
BILIRUB SERPL-MCNC: 0.5 MG/DL — SIGNIFICANT CHANGE UP (ref 0.2–1.2)
BUN SERPL-MCNC: 18 MG/DL — SIGNIFICANT CHANGE UP (ref 7–23)
CALCIUM SERPL-MCNC: 9.8 MG/DL — SIGNIFICANT CHANGE UP (ref 8.4–10.5)
CHLORIDE SERPL-SCNC: 98 MMOL/L — SIGNIFICANT CHANGE UP (ref 98–107)
CO2 SERPL-SCNC: 27 MMOL/L — SIGNIFICANT CHANGE UP (ref 22–31)
CREAT SERPL-MCNC: 0.93 MG/DL — SIGNIFICANT CHANGE UP (ref 0.5–1.3)
EGFR: 93 ML/MIN/1.73M2 — SIGNIFICANT CHANGE UP
EOSINOPHIL # BLD AUTO: 0.07 K/UL — SIGNIFICANT CHANGE UP (ref 0–0.5)
EOSINOPHIL NFR BLD AUTO: 0.7 % — SIGNIFICANT CHANGE UP (ref 0–6)
GLUCOSE SERPL-MCNC: 136 MG/DL — HIGH (ref 70–99)
HCT VFR BLD CALC: 41.9 % — SIGNIFICANT CHANGE UP (ref 39–50)
HGB BLD-MCNC: 13.9 G/DL — SIGNIFICANT CHANGE UP (ref 13–17)
IANC: 7.41 K/UL — HIGH (ref 1.8–7.4)
IMM GRANULOCYTES NFR BLD AUTO: 0.5 % — SIGNIFICANT CHANGE UP (ref 0–0.9)
LYMPHOCYTES # BLD AUTO: 2.19 K/UL — SIGNIFICANT CHANGE UP (ref 1–3.3)
LYMPHOCYTES # BLD AUTO: 20.5 % — SIGNIFICANT CHANGE UP (ref 13–44)
MCHC RBC-ENTMCNC: 30.3 PG — SIGNIFICANT CHANGE UP (ref 27–34)
MCHC RBC-ENTMCNC: 33.2 GM/DL — SIGNIFICANT CHANGE UP (ref 32–36)
MCV RBC AUTO: 91.3 FL — SIGNIFICANT CHANGE UP (ref 80–100)
MONOCYTES # BLD AUTO: 0.94 K/UL — HIGH (ref 0–0.9)
MONOCYTES NFR BLD AUTO: 8.8 % — SIGNIFICANT CHANGE UP (ref 2–14)
NEUTROPHILS # BLD AUTO: 7.41 K/UL — HIGH (ref 1.8–7.4)
NEUTROPHILS NFR BLD AUTO: 69.1 % — SIGNIFICANT CHANGE UP (ref 43–77)
NRBC # BLD: 0 /100 WBCS — SIGNIFICANT CHANGE UP (ref 0–0)
NRBC # FLD: 0 K/UL — SIGNIFICANT CHANGE UP (ref 0–0)
PLATELET # BLD AUTO: 318 K/UL — SIGNIFICANT CHANGE UP (ref 150–400)
POTASSIUM SERPL-MCNC: 4.1 MMOL/L — SIGNIFICANT CHANGE UP (ref 3.5–5.3)
POTASSIUM SERPL-SCNC: 4.1 MMOL/L — SIGNIFICANT CHANGE UP (ref 3.5–5.3)
PROT SERPL-MCNC: 7.7 G/DL — SIGNIFICANT CHANGE UP (ref 6–8.3)
RBC # BLD: 4.59 M/UL — SIGNIFICANT CHANGE UP (ref 4.2–5.8)
RBC # FLD: 13.7 % — SIGNIFICANT CHANGE UP (ref 10.3–14.5)
SODIUM SERPL-SCNC: 137 MMOL/L — SIGNIFICANT CHANGE UP (ref 135–145)
VALPROATE SERPL-MCNC: 65.6 UG/ML — SIGNIFICANT CHANGE UP (ref 50–100)
WBC # BLD: 10.7 K/UL — HIGH (ref 3.8–10.5)
WBC # FLD AUTO: 10.7 K/UL — HIGH (ref 3.8–10.5)

## 2022-11-08 PROCEDURE — 90853 GROUP PSYCHOTHERAPY: CPT

## 2022-11-08 PROCEDURE — 99232 SBSQ HOSP IP/OBS MODERATE 35: CPT

## 2022-11-08 RX ORDER — DIVALPROEX SODIUM 500 MG/1
2000 TABLET, DELAYED RELEASE ORAL AT BEDTIME
Refills: 0 | Status: DISCONTINUED | OUTPATIENT
Start: 2022-11-08 | End: 2022-11-28

## 2022-11-08 RX ORDER — RISPERIDONE 4 MG/1
2 TABLET ORAL AT BEDTIME
Refills: 0 | Status: DISCONTINUED | OUTPATIENT
Start: 2022-11-08 | End: 2022-11-11

## 2022-11-08 RX ADMIN — RISPERIDONE 2 MILLIGRAM(S): 4 TABLET ORAL at 20:15

## 2022-11-08 RX ADMIN — SENNA PLUS 2 TABLET(S): 8.6 TABLET ORAL at 20:14

## 2022-11-08 RX ADMIN — DIVALPROEX SODIUM 2000 MILLIGRAM(S): 500 TABLET, DELAYED RELEASE ORAL at 20:20

## 2022-11-08 NOTE — BH INPATIENT PSYCHIATRY PROGRESS NOTE - NSBHFUPINTERVALHXFT_PSY_A_CORE
Staff report no interval events, slept only form 1038-5373, compliant with meds.  Risperidone was started last night.  depakote level this am 65.  He remains very manic.  Increase depakote er to 2000mg at bedtime and risperidone to 2mg at bedtime.

## 2022-11-08 NOTE — BH INPATIENT PSYCHIATRY PROGRESS NOTE - NSBHASSESSSUMMFT_PSY_ALL_CORE
Patient is a 62 year old male, domiciled alone, unemployed, , noncaregiver, PPHx with working diagnoses of: Bipolar 1 disorder, MRE manic and depressive disorder NOS, 1 prior psychiatric hospitalization at [facility] (10/23-11/26/2008), denies prior suicide attempts, not in outpt tx, denies hx of violence, denies hx of legal issues/prior arrests, denies substance use/prior withdrawal/DTs, PMH of T2DM, chronic back pain, +family history, +hx treatment response to depakote and lithium brought in by self presenting with multiple medical complaints found to be manic, irritable and disorganized.  He was admitted on a 939 legal status.  He has limited sleep, profuse and pressured verbal production, tangentiality/flight of ideas and an irritable labile dramatic affect.  He is unable to care for self.  He slept last night (11/2) after we increased depakote er to 1500mg at bedtime.  Tonight is night #3 of depakote er 1500mg at bedtime.    Remains very manic with a dpeakote level of 65    1. Continue 939 hospitalization for safety and stabilization  2. haldol, ativan and benadryl prns  3. increase risperidone to 2mg at bedtime, increase depakote to 2000mg at bedtime  4. routine checks appropriate (can make needs known)  5. hx dm2--follow up a1c; will monitor for hyeprlipidema  6. back pain--otc meds.

## 2022-11-08 NOTE — BH INPATIENT PSYCHIATRY PROGRESS NOTE - OTHER
profuse verbal production continued  less irritable no frankly bizarre delusional content but is preoccupied with thoughts about family, grandiosity less irritable, somewhat blunted "alright".

## 2022-11-08 NOTE — BH INPATIENT PSYCHIATRY PROGRESS NOTE - CURRENT MEDICATION
MEDICATIONS  (STANDING):  divalproex ER 2000 milliGRAM(s) Oral at bedtime  risperiDONE  Disintegrating Tablet 2 milliGRAM(s) Oral at bedtime  senna 2 Tablet(s) Oral at bedtime    MEDICATIONS  (PRN):  acetaminophen     Tablet .. 650 milliGRAM(s) Oral every 6 hours PRN Mild Pain (1 - 3), Moderate Pain (4 - 6)  diphenhydrAMINE 50 milliGRAM(s) Oral every 6 hours PRN agitation  diphenhydrAMINE Injectable 50 milliGRAM(s) IntraMuscular once PRN agitation  haloperidol     Tablet 5 milliGRAM(s) Oral every 6 hours PRN agitation  haloperidol    Injectable 5 milliGRAM(s) IntraMuscular once PRN Agitation  LORazepam     Tablet 2 milliGRAM(s) Oral every 6 hours PRN Agitation  LORazepam   Injectable 2 milliGRAM(s) IntraMuscular Once PRN Agitation  melatonin. 5 milliGRAM(s) Oral at bedtime PRN Insomnia

## 2022-11-09 PROCEDURE — 99231 SBSQ HOSP IP/OBS SF/LOW 25: CPT

## 2022-11-09 RX ADMIN — DIVALPROEX SODIUM 2000 MILLIGRAM(S): 500 TABLET, DELAYED RELEASE ORAL at 20:39

## 2022-11-09 RX ADMIN — Medication 650 MILLIGRAM(S): at 21:38

## 2022-11-09 RX ADMIN — Medication 650 MILLIGRAM(S): at 20:38

## 2022-11-09 RX ADMIN — SENNA PLUS 2 TABLET(S): 8.6 TABLET ORAL at 20:39

## 2022-11-09 RX ADMIN — RISPERIDONE 2 MILLIGRAM(S): 4 TABLET ORAL at 20:39

## 2022-11-09 NOTE — BH INPATIENT PSYCHIATRY PROGRESS NOTE - NSBHFUPINTERVALHXFT_PSY_A_CORE
Staff report no interval events, notably slept overnight.  No prns.  Showered.  Hypergraphy.  Continues with profuse pressured production.  Relates at times that "I hear my roommate.  It's not hallucinations, just really good hearing."

## 2022-11-09 NOTE — BH INPATIENT PSYCHIATRY PROGRESS NOTE - NSBHASSESSSUMMFT_PSY_ALL_CORE
Patient is a 62 year old male, domiciled alone, unemployed, , noncaregiver, PPHx with working diagnoses of: Bipolar 1 disorder, MRE manic and depressive disorder NOS, 1 prior psychiatric hospitalization at [facility] (10/23-11/26/2008), denies prior suicide attempts, not in outpt tx, denies hx of violence, denies hx of legal issues/prior arrests, denies substance use/prior withdrawal/DTs, PMH of T2DM, chronic back pain, +family history, +hx treatment response to depakote and lithium brought in by self presenting with multiple medical complaints found to be manic, irritable and disorganized.  He was admitted on a 939 legal status.  He has limited sleep, profuse and pressured verbal production, tangentiality/flight of ideas and an irritable labile dramatic affect.  He is unable to care for self.  He slept last night (11/2) after we increased depakote er to 1500mg at bedtime.  Tonight is night #2 of depakote er 2000mg at bedtime and risperidone 2mg at bedtime.    Remains very manic with a dpeakote level of 65    1. Continue 939 hospitalization for safety and stabilization  2. haldol, ativan and benadryl prns  3. risperidone 2mg at bedtime, depakote er 2000mg at bedtime  4. routine checks appropriate (can make needs known)  5. hx dm2--follow up a1c; will monitor for hyeprlipidema  6. back pain--otc meds.

## 2022-11-09 NOTE — BH INPATIENT PSYCHIATRY PROGRESS NOTE - OTHER
no frankly bizarre delusional content but is preoccupied with thoughts about family, grandiosity some comments suggestive of possible AH less irritable, somewhat blunted profuse verbal production continued  less irritable "good".

## 2022-11-10 PROCEDURE — 99231 SBSQ HOSP IP/OBS SF/LOW 25: CPT

## 2022-11-10 RX ADMIN — Medication 650 MILLIGRAM(S): at 02:49

## 2022-11-10 RX ADMIN — Medication 650 MILLIGRAM(S): at 23:42

## 2022-11-10 RX ADMIN — RISPERIDONE 2 MILLIGRAM(S): 4 TABLET ORAL at 21:34

## 2022-11-10 RX ADMIN — DIVALPROEX SODIUM 2000 MILLIGRAM(S): 500 TABLET, DELAYED RELEASE ORAL at 21:30

## 2022-11-10 RX ADMIN — Medication 650 MILLIGRAM(S): at 03:47

## 2022-11-10 RX ADMIN — SENNA PLUS 2 TABLET(S): 8.6 TABLET ORAL at 21:30

## 2022-11-10 NOTE — BH INPATIENT PSYCHIATRY PROGRESS NOTE - OTHER
less irritable, somewhat blunted some comments suggestive of possible AH profuse verbal production continued  less irritable no frankly bizarre delusional content but is preoccupied with thoughts about family, grandiosity "good".

## 2022-11-10 NOTE — BH INPATIENT PSYCHIATRY PROGRESS NOTE - NSBHFUPINTERVALHXFT_PSY_A_CORE
Staff report no interval events, slept but awake form 8956-4648 and then woke for the day at 0600.  He is less preoccupied with family but is still disorganized and pressured, perhaps less irritable although his irritability still does arise from time time time.  However, he is still very disorganized and unable to care for himself.

## 2022-11-11 PROCEDURE — 99231 SBSQ HOSP IP/OBS SF/LOW 25: CPT

## 2022-11-11 RX ORDER — RISPERIDONE 4 MG/1
3 TABLET ORAL AT BEDTIME
Refills: 0 | Status: DISCONTINUED | OUTPATIENT
Start: 2022-11-11 | End: 2022-11-14

## 2022-11-11 RX ADMIN — RISPERIDONE 3 MILLIGRAM(S): 4 TABLET ORAL at 20:31

## 2022-11-11 RX ADMIN — DIVALPROEX SODIUM 2000 MILLIGRAM(S): 500 TABLET, DELAYED RELEASE ORAL at 20:30

## 2022-11-11 RX ADMIN — Medication 650 MILLIGRAM(S): at 22:43

## 2022-11-11 RX ADMIN — Medication 650 MILLIGRAM(S): at 01:15

## 2022-11-11 RX ADMIN — SENNA PLUS 2 TABLET(S): 8.6 TABLET ORAL at 20:31

## 2022-11-11 NOTE — BH INPATIENT PSYCHIATRY PROGRESS NOTE - OTHER
blunted, irritable, amused profuse verbal production irritability turning into a caricature of itself "good".  no frankly bizarre delusional content but is preoccupied with thoughts about family, grandiosity

## 2022-11-11 NOTE — BH INPATIENT PSYCHIATRY PROGRESS NOTE - NSBHASSESSSUMMFT_PSY_ALL_CORE
Patient is a 62 year old male, domiciled alone, unemployed, , noncaregiver, PPHx with working diagnoses of: Bipolar 1 disorder, MRE manic and depressive disorder NOS, 1 prior psychiatric hospitalization at [facility] (10/23-11/26/2008), denies prior suicide attempts, not in outpt tx, denies hx of violence, denies hx of legal issues/prior arrests, denies substance use/prior withdrawal/DTs, PMH of T2DM, chronic back pain, +family history, +hx treatment response to depakote and lithium brought in by self presenting with multiple medical complaints found to be manic, irritable and disorganized.  He was admitted on a 939 legal status.  He has limited sleep, profuse and pressured verbal production, tangentiality/flight of ideas and an irritable labile dramatic affect.  He is unable to care for self.  He slept last night (11/2) after we increased depakote er to 1500mg at bedtime.  We will increase risperidone to 3mg at bedtime, continue depakote, repeat labs early next week.      Still manic and unable to care for self but with some mild improvement with a depakote level of 65    1. Continue 939 hospitalization for safety and stabilization  2. haldol, ativan and benadryl prns  3. risperidone 3mg at bedtime, depakote er 2000mg at bedtime  4. routine checks appropriate (can make needs known)  5. hx dm2--follow up a1c; will monitor for hyeprlipidema  6. back pain--otc meds.

## 2022-11-11 NOTE — BH INPATIENT PSYCHIATRY PROGRESS NOTE - NSBHFUPINTERVALHXFT_PSY_A_CORE
Staff report no interval events, compliant, tylenol prns, slept 7444-9724 and 9510-3784.  They report he is still easily irritable.  He continues with pressured speech and flight of ideas and poor sleep.  Will increase risperidone to 3mg at bedtime.

## 2022-11-11 NOTE — BH INPATIENT PSYCHIATRY PROGRESS NOTE - CURRENT MEDICATION
MEDICATIONS  (STANDING):  divalproex ER 2000 milliGRAM(s) Oral at bedtime  risperiDONE  Disintegrating Tablet 3 milliGRAM(s) Oral at bedtime  senna 2 Tablet(s) Oral at bedtime    MEDICATIONS  (PRN):  acetaminophen     Tablet .. 650 milliGRAM(s) Oral every 6 hours PRN Mild Pain (1 - 3), Moderate Pain (4 - 6)  diphenhydrAMINE 50 milliGRAM(s) Oral every 6 hours PRN agitation  diphenhydrAMINE Injectable 50 milliGRAM(s) IntraMuscular once PRN agitation  haloperidol     Tablet 5 milliGRAM(s) Oral every 6 hours PRN agitation  haloperidol    Injectable 5 milliGRAM(s) IntraMuscular once PRN Agitation  LORazepam     Tablet 2 milliGRAM(s) Oral every 6 hours PRN Agitation  LORazepam   Injectable 2 milliGRAM(s) IntraMuscular Once PRN Agitation  melatonin. 5 milliGRAM(s) Oral at bedtime PRN Insomnia

## 2022-11-12 RX ADMIN — Medication 650 MILLIGRAM(S): at 21:41

## 2022-11-12 RX ADMIN — DIVALPROEX SODIUM 2000 MILLIGRAM(S): 500 TABLET, DELAYED RELEASE ORAL at 21:43

## 2022-11-12 RX ADMIN — Medication 650 MILLIGRAM(S): at 19:52

## 2022-11-12 RX ADMIN — RISPERIDONE 3 MILLIGRAM(S): 4 TABLET ORAL at 21:43

## 2022-11-12 RX ADMIN — SENNA PLUS 2 TABLET(S): 8.6 TABLET ORAL at 21:43

## 2022-11-13 RX ADMIN — DIVALPROEX SODIUM 2000 MILLIGRAM(S): 500 TABLET, DELAYED RELEASE ORAL at 22:00

## 2022-11-13 RX ADMIN — Medication 650 MILLIGRAM(S): at 07:55

## 2022-11-13 RX ADMIN — Medication 650 MILLIGRAM(S): at 22:04

## 2022-11-13 RX ADMIN — SENNA PLUS 2 TABLET(S): 8.6 TABLET ORAL at 21:59

## 2022-11-13 RX ADMIN — Medication 650 MILLIGRAM(S): at 09:20

## 2022-11-13 RX ADMIN — RISPERIDONE 3 MILLIGRAM(S): 4 TABLET ORAL at 21:59

## 2022-11-14 PROCEDURE — 99232 SBSQ HOSP IP/OBS MODERATE 35: CPT

## 2022-11-14 RX ORDER — PALIPERIDONE 1.5 MG/1
6 TABLET, EXTENDED RELEASE ORAL AT BEDTIME
Refills: 0 | Status: DISCONTINUED | OUTPATIENT
Start: 2022-11-14 | End: 2022-11-17

## 2022-11-14 RX ADMIN — SENNA PLUS 2 TABLET(S): 8.6 TABLET ORAL at 21:34

## 2022-11-14 RX ADMIN — DIVALPROEX SODIUM 2000 MILLIGRAM(S): 500 TABLET, DELAYED RELEASE ORAL at 21:33

## 2022-11-14 RX ADMIN — PALIPERIDONE 6 MILLIGRAM(S): 1.5 TABLET, EXTENDED RELEASE ORAL at 21:33

## 2022-11-14 NOTE — BH INPATIENT PSYCHIATRY PROGRESS NOTE - CURRENT MEDICATION
MEDICATIONS  (STANDING):  divalproex ER 2000 milliGRAM(s) Oral at bedtime  paliperidone ER 6 milliGRAM(s) Oral at bedtime  senna 2 Tablet(s) Oral at bedtime    MEDICATIONS  (PRN):  acetaminophen     Tablet .. 650 milliGRAM(s) Oral every 6 hours PRN Mild Pain (1 - 3), Moderate Pain (4 - 6)  diphenhydrAMINE 50 milliGRAM(s) Oral every 6 hours PRN agitation  diphenhydrAMINE Injectable 50 milliGRAM(s) IntraMuscular once PRN agitation  haloperidol     Tablet 5 milliGRAM(s) Oral every 6 hours PRN agitation  haloperidol    Injectable 5 milliGRAM(s) IntraMuscular once PRN Agitation  LORazepam     Tablet 2 milliGRAM(s) Oral every 6 hours PRN Agitation  LORazepam   Injectable 2 milliGRAM(s) IntraMuscular Once PRN Agitation  melatonin. 5 milliGRAM(s) Oral at bedtime PRN Insomnia

## 2022-11-14 NOTE — BH INPATIENT PSYCHIATRY PROGRESS NOTE - NSBHASSESSSUMMFT_PSY_ALL_CORE
Patient is a 62 year old male, domiciled alone, unemployed, , noncaregiver, PPHx with working diagnoses of: Bipolar 1 disorder, MRE manic and depressive disorder NOS, 1 prior psychiatric hospitalization at [facility] (10/23-11/26/2008), denies prior suicide attempts, not in outpt tx, denies hx of violence, denies hx of legal issues/prior arrests, denies substance use/prior withdrawal/DTs, PMH of T2DM, chronic back pain, +family history, +hx treatment response to depakote and lithium brought in by self presenting with multiple medical complaints found to be manic, irritable and disorganized.  He was admitted on a 939 legal status.  He has limited sleep, profuse and pressured verbal production, tangentiality/flight of ideas and an irritable labile dramatic affect.  He is unable to care for self.  He slept (11/2) after we increased depakote er to 1500mg at bedtime.      Still manic and unable to care for self but with some mild improvement with a depakote level of 65  We increased depakote to 2000mg at bedtime and risperidone to 3mg at bedtime with some mild improvement.  We will conitnue depakote 2000mg at bedtime and switch from risperidone 3mg at bedtime to invega 6mg at bedtime as they are more or less the same medication but invega is less likely than risperidone to cause orthostasis and other side effects on once daily at bedtime dosing   1. Continue 939 hospitalization for safety and stabilization  2. haldol, ativan and benadryl prns  3. switch to invega 6mg daily at bedtime with plan to titrate.  depakote er 2000mg at bedtime level in am.  4. routine checks appropriate (can make needs known)  5. hx dm2--follow up a1c; will monitor for hyeprlipidema  6. back pain--otc meds.

## 2022-11-14 NOTE — BH INPATIENT PSYCHIATRY PROGRESS NOTE - NSBHFUPINTERVALHXFT_PSY_A_CORE
Staff report no interval events, compliant, tylenol prns, slept last night but was awake the two previous nights from 0315 on and 0145 on.  He is reported to be less irritable but still tangential flighty but is also flirtatious with female peer.  No horribly inapporpriate behavior, however.  Enforcing boundaries.

## 2022-11-14 NOTE — BH INPATIENT PSYCHIATRY PROGRESS NOTE - OTHER
"great".  limited by thought disorder improving talks about cameras in room observing him and so on less irritable but somewhat intense, somewhat elvated

## 2022-11-15 LAB
ALBUMIN SERPL ELPH-MCNC: 3.8 G/DL — SIGNIFICANT CHANGE UP (ref 3.3–5)
ALP SERPL-CCNC: 87 U/L — SIGNIFICANT CHANGE UP (ref 40–120)
ALT FLD-CCNC: 22 U/L — SIGNIFICANT CHANGE UP (ref 4–41)
ANION GAP SERPL CALC-SCNC: 11 MMOL/L — SIGNIFICANT CHANGE UP (ref 7–14)
AST SERPL-CCNC: 21 U/L — SIGNIFICANT CHANGE UP (ref 4–40)
BASOPHILS # BLD AUTO: 0.06 K/UL — SIGNIFICANT CHANGE UP (ref 0–0.2)
BASOPHILS NFR BLD AUTO: 0.6 % — SIGNIFICANT CHANGE UP (ref 0–2)
BILIRUB SERPL-MCNC: 0.4 MG/DL — SIGNIFICANT CHANGE UP (ref 0.2–1.2)
BUN SERPL-MCNC: 20 MG/DL — SIGNIFICANT CHANGE UP (ref 7–23)
CALCIUM SERPL-MCNC: 9.5 MG/DL — SIGNIFICANT CHANGE UP (ref 8.4–10.5)
CHLORIDE SERPL-SCNC: 99 MMOL/L — SIGNIFICANT CHANGE UP (ref 98–107)
CO2 SERPL-SCNC: 29 MMOL/L — SIGNIFICANT CHANGE UP (ref 22–31)
CREAT SERPL-MCNC: 0.92 MG/DL — SIGNIFICANT CHANGE UP (ref 0.5–1.3)
EGFR: 94 ML/MIN/1.73M2 — SIGNIFICANT CHANGE UP
EOSINOPHIL # BLD AUTO: 0.1 K/UL — SIGNIFICANT CHANGE UP (ref 0–0.5)
EOSINOPHIL NFR BLD AUTO: 1 % — SIGNIFICANT CHANGE UP (ref 0–6)
GLUCOSE SERPL-MCNC: 104 MG/DL — HIGH (ref 70–99)
HCT VFR BLD CALC: 41.4 % — SIGNIFICANT CHANGE UP (ref 39–50)
HGB BLD-MCNC: 13.4 G/DL — SIGNIFICANT CHANGE UP (ref 13–17)
IANC: 5.98 K/UL — SIGNIFICANT CHANGE UP (ref 1.8–7.4)
IMM GRANULOCYTES NFR BLD AUTO: 0.8 % — SIGNIFICANT CHANGE UP (ref 0–0.9)
LYMPHOCYTES # BLD AUTO: 2.24 K/UL — SIGNIFICANT CHANGE UP (ref 1–3.3)
LYMPHOCYTES # BLD AUTO: 23.3 % — SIGNIFICANT CHANGE UP (ref 13–44)
MCHC RBC-ENTMCNC: 30 PG — SIGNIFICANT CHANGE UP (ref 27–34)
MCHC RBC-ENTMCNC: 32.4 GM/DL — SIGNIFICANT CHANGE UP (ref 32–36)
MCV RBC AUTO: 92.8 FL — SIGNIFICANT CHANGE UP (ref 80–100)
MONOCYTES # BLD AUTO: 1.14 K/UL — HIGH (ref 0–0.9)
MONOCYTES NFR BLD AUTO: 11.9 % — SIGNIFICANT CHANGE UP (ref 2–14)
NEUTROPHILS # BLD AUTO: 5.98 K/UL — SIGNIFICANT CHANGE UP (ref 1.8–7.4)
NEUTROPHILS NFR BLD AUTO: 62.4 % — SIGNIFICANT CHANGE UP (ref 43–77)
NRBC # BLD: 0 /100 WBCS — SIGNIFICANT CHANGE UP (ref 0–0)
NRBC # FLD: 0 K/UL — SIGNIFICANT CHANGE UP (ref 0–0)
PLATELET # BLD AUTO: 250 K/UL — SIGNIFICANT CHANGE UP (ref 150–400)
POTASSIUM SERPL-MCNC: 4.4 MMOL/L — SIGNIFICANT CHANGE UP (ref 3.5–5.3)
POTASSIUM SERPL-SCNC: 4.4 MMOL/L — SIGNIFICANT CHANGE UP (ref 3.5–5.3)
PROT SERPL-MCNC: 7.2 G/DL — SIGNIFICANT CHANGE UP (ref 6–8.3)
RBC # BLD: 4.46 M/UL — SIGNIFICANT CHANGE UP (ref 4.2–5.8)
RBC # FLD: 13.6 % — SIGNIFICANT CHANGE UP (ref 10.3–14.5)
SODIUM SERPL-SCNC: 139 MMOL/L — SIGNIFICANT CHANGE UP (ref 135–145)
VALPROATE SERPL-MCNC: 81.3 UG/ML — SIGNIFICANT CHANGE UP (ref 50–100)
WBC # BLD: 9.6 K/UL — SIGNIFICANT CHANGE UP (ref 3.8–10.5)
WBC # FLD AUTO: 9.6 K/UL — SIGNIFICANT CHANGE UP (ref 3.8–10.5)

## 2022-11-15 PROCEDURE — 90853 GROUP PSYCHOTHERAPY: CPT

## 2022-11-15 PROCEDURE — 99232 SBSQ HOSP IP/OBS MODERATE 35: CPT

## 2022-11-15 RX ADMIN — Medication 650 MILLIGRAM(S): at 01:02

## 2022-11-15 RX ADMIN — Medication 650 MILLIGRAM(S): at 20:10

## 2022-11-15 NOTE — BH INPATIENT PSYCHIATRY PROGRESS NOTE - OTHER
irritable elevated limited by thought disorder "great".  improving talks about cameras in room observing him and so on

## 2022-11-15 NOTE — BH INPATIENT PSYCHIATRY PROGRESS NOTE - NSBHASSESSSUMMFT_PSY_ALL_CORE
Patient is a 62 year old male, domiciled alone, unemployed, , noncaregiver, PPHx with working diagnoses of: Bipolar 1 disorder, MRE manic and depressive disorder NOS, 1 prior psychiatric hospitalization at [facility] (10/23-11/26/2008), denies prior suicide attempts, not in outpt tx, denies hx of violence, denies hx of legal issues/prior arrests, denies substance use/prior withdrawal/DTs, PMH of T2DM, chronic back pain, +family history, +hx treatment response to depakote and lithium brought in by self presenting with multiple medical complaints found to be manic, irritable and disorganized.  He was admitted on a 939 legal status.  He has limited sleep, profuse and pressured verbal production, tangentiality/flight of ideas and an irritable labile dramatic affect.  He is unable to care for self.  He slept (11/2) after we increased depakote er to 1500mg at bedtime.  He was still manic and unable to care for self but with some mild improvement and a steady state depakote level of 65.  We increased depakote to 2000mg at bedtime and risperidone to 3mg at bedtime with some mild improvement.  He was still symptomatic on this regimen and with a depakote level of 81.  We transitioned from risperidone 3 to invega 6 at bedtime to minimize risk of orthostasis while preserving bedtime dosing.  Tonight is day #2 of invega 6 at bedtime.  Anticipate increaseing depakote in another day or two from 2000 at beditme to 2500 at bedtime because he is tolerating it well.      1. Continue 939 hospitalization for safety and stabilization  2. haldol, ativan and benadryl prns  3. o invega 6mg daily at bedtime day #2 .  depakote er 2000mg at bedtime level 81 anticipate incrase tomrrrow night.  .  4. routine checks appropriate (can make needs known)  5. hx dm2--follow up a1c; will monitor for hyeprlipidema  6. back pain--otc meds.

## 2022-11-15 NOTE — BH INPATIENT PSYCHIATRY PROGRESS NOTE - NSBHFUPINTERVALHXFT_PSY_A_CORE
Staff report no interval events, compliant, tylenol prns.  Awake 6528-4434 and then from 0415 on.  He was described as depressed last night but our experience is that he is still labile and although there are flashes of dysphoria, he is more so bright or irritable and less so dysphoric.  Depakote level 81 this am

## 2022-11-16 PROCEDURE — 99231 SBSQ HOSP IP/OBS SF/LOW 25: CPT

## 2022-11-16 RX ADMIN — DIVALPROEX SODIUM 2000 MILLIGRAM(S): 500 TABLET, DELAYED RELEASE ORAL at 21:09

## 2022-11-16 RX ADMIN — Medication 650 MILLIGRAM(S): at 22:11

## 2022-11-16 RX ADMIN — PALIPERIDONE 6 MILLIGRAM(S): 1.5 TABLET, EXTENDED RELEASE ORAL at 21:09

## 2022-11-16 RX ADMIN — Medication 650 MILLIGRAM(S): at 21:11

## 2022-11-16 NOTE — DIETITIAN INITIAL EVALUATION ADULT - OTHER INFO
Pt admitted to [facility] with primary diagnosis of Severe manic bipolar 1 disorder. Medical history pertinent for Type 2 diabetes. Current A1C 6.4 Saw Pt in his room. Reports good appetite/po intake. No GI distress noted. Pt states " I don't have diabetes. Provided written materials regarding [employer] for review. Pt not a candiate for diet education at this time. Pt verbalized food preferences, will implement them. NKFA.

## 2022-11-16 NOTE — BH INPATIENT PSYCHIATRY PROGRESS NOTE - NSBHFUPINTERVALHXFT_PSY_A_CORE
Staff report awake since 0130 this am.  He refused depakote and paliperidone last night stating that he was unhappy with the nursing staff.  He reports he will accept this am.  He reportd some loose stool last night and we will dc senna.  He continues manic but group therapist reports that yesterday he was able to tolerate being redirected better than previously.

## 2022-11-16 NOTE — BH INPATIENT PSYCHIATRY PROGRESS NOTE - OTHER
irritable elevated sarcastic "good".  limited by thought disorder improving talks about cameras in room observing him and so on; preoccupied with defending females

## 2022-11-16 NOTE — BH INPATIENT PSYCHIATRY PROGRESS NOTE - CURRENT MEDICATION
MEDICATIONS  (STANDING):  divalproex ER 2000 milliGRAM(s) Oral at bedtime  paliperidone ER 6 milliGRAM(s) Oral at bedtime    MEDICATIONS  (PRN):  acetaminophen     Tablet .. 650 milliGRAM(s) Oral every 6 hours PRN Mild Pain (1 - 3), Moderate Pain (4 - 6)  diphenhydrAMINE 50 milliGRAM(s) Oral every 6 hours PRN agitation  diphenhydrAMINE Injectable 50 milliGRAM(s) IntraMuscular once PRN agitation  haloperidol     Tablet 5 milliGRAM(s) Oral every 6 hours PRN agitation  haloperidol    Injectable 5 milliGRAM(s) IntraMuscular once PRN Agitation  LORazepam     Tablet 2 milliGRAM(s) Oral every 6 hours PRN Agitation  LORazepam   Injectable 2 milliGRAM(s) IntraMuscular Once PRN Agitation  melatonin. 5 milliGRAM(s) Oral at bedtime PRN Insomnia

## 2022-11-16 NOTE — DIETITIAN INITIAL EVALUATION ADULT - PERTINENT MEDS FT
MEDICATIONS  (STANDING):  divalproex ER 2000 milliGRAM(s) Oral at bedtime  paliperidone ER 6 milliGRAM(s) Oral at bedtime  senna 2 Tablet(s) Oral at bedtime

## 2022-11-16 NOTE — BH INPATIENT PSYCHIATRY PROGRESS NOTE - NSBHASSESSSUMMFT_PSY_ALL_CORE
Patient is a 62 year old male, domiciled alone, unemployed, , noncaregiver, PPHx with working diagnoses of: Bipolar 1 disorder, MRE manic and depressive disorder NOS, 1 prior psychiatric hospitalization at [facility] (10/23-11/26/2008), denies prior suicide attempts, not in outpt tx, denies hx of violence, denies hx of legal issues/prior arrests, denies substance use/prior withdrawal/DTs, PMH of T2DM, chronic back pain, +family history, +hx treatment response to depakote and lithium brought in by self presenting with multiple medical complaints found to be manic, irritable and disorganized.  He was admitted on a 939 legal status.  He has limited sleep, profuse and pressured verbal production, tangentiality/flight of ideas and an irritable labile dramatic affect.  He is unable to care for self.  He slept (11/2) after we increased depakote er to 1500mg at bedtime.  He was still manic and unable to care for self but with some mild improvement and a steady state depakote level of 65.  We increased depakote to 2000mg at bedtime and risperidone to 3mg at bedtime with some mild improvement.  He was still symptomatic on this regimen and with a depakote level of 81.  We transitioned from risperidone 3 to invega 6 at bedtime to minimize risk of orthostasis while preserving bedtime dosing. He refused meds last night for unclkear reasons.  He was showing some improved impulsse control and realtedness but sleep was poor.  He agrees to accept meds tonight.     1. Continue inpatient hospitalization for safety and stabilization  2. haldol, ativan and benadryl prns  3. invega 6mg daily at bedtime ; depakote er 2000mg at bedtime level 81 11/15 am.  4. routine checks appropriate (can make needs known)  5. hx dm2--follow up a1c; will monitor for hyeprlipidema  6. back pain--otc meds.

## 2022-11-16 NOTE — DIETITIAN INITIAL EVALUATION ADULT - NS FNS DIET ORDER

## 2022-11-17 PROCEDURE — 99231 SBSQ HOSP IP/OBS SF/LOW 25: CPT

## 2022-11-17 RX ORDER — IBUPROFEN 200 MG
600 TABLET ORAL EVERY 6 HOURS
Refills: 0 | Status: DISCONTINUED | OUTPATIENT
Start: 2022-11-17 | End: 2022-12-16

## 2022-11-17 RX ORDER — ACETAMINOPHEN 500 MG
650 TABLET ORAL EVERY 6 HOURS
Refills: 0 | Status: DISCONTINUED | OUTPATIENT
Start: 2022-11-17 | End: 2022-12-16

## 2022-11-17 RX ORDER — PALIPERIDONE 1.5 MG/1
9 TABLET, EXTENDED RELEASE ORAL DAILY
Refills: 0 | Status: DISCONTINUED | OUTPATIENT
Start: 2022-11-17 | End: 2022-11-21

## 2022-11-17 RX ADMIN — Medication 650 MILLIGRAM(S): at 06:15

## 2022-11-17 RX ADMIN — Medication 2 MILLIGRAM(S): at 11:55

## 2022-11-17 RX ADMIN — Medication 600 MILLIGRAM(S): at 12:30

## 2022-11-17 RX ADMIN — DIVALPROEX SODIUM 2000 MILLIGRAM(S): 500 TABLET, DELAYED RELEASE ORAL at 21:49

## 2022-11-17 RX ADMIN — Medication 650 MILLIGRAM(S): at 22:21

## 2022-11-17 RX ADMIN — Medication 650 MILLIGRAM(S): at 23:21

## 2022-11-17 RX ADMIN — Medication 600 MILLIGRAM(S): at 11:52

## 2022-11-17 RX ADMIN — Medication 650 MILLIGRAM(S): at 07:00

## 2022-11-17 NOTE — BH INPATIENT PSYCHIATRY PROGRESS NOTE - NSBHASSESSSUMMFT_PSY_ALL_CORE
Patient is a 62 year old male, domiciled alone, unemployed, , noncaregiver, PPHx with working diagnoses of: Bipolar 1 disorder, MRE manic and depressive disorder NOS, 1 prior psychiatric hospitalization at [facility] (10/23-11/26/2008), denies prior suicide attempts, not in outpt tx, denies hx of violence, denies hx of legal issues/prior arrests, denies substance use/prior withdrawal/DTs, PMH of T2DM, chronic back pain, +family history, +hx treatment response to depakote and lithium brought in by self presenting with multiple medical complaints found to be manic, irritable and disorganized.  He was admitted on a 939 legal status.  He has limited sleep, profuse and pressured verbal production, tangentiality/flight of ideas and an irritable labile dramatic affect.  He is unable to care for self.  He slept (11/2) after we increased depakote er to 1500mg at bedtime.  He was still manic and unable to care for self but with some mild improvement and a steady state depakote level of 65.  We increased depakote to 2000mg at bedtime and risperidone to 3mg at bedtime with some mild improvement.  He was still symptomatic on this regimen and with a depakote level of 81.  We transitioned from risperidone 3 to invega 6 at bedtime to minimize risk of orthostasis while preserving bedtime dosing. He refused meds last night for unclkear reasons.  He was showing some improved impulsse control and realtedness but sleep was poor. We are increasing invega to 9mg at bedtime    1. Continue inpatient hospitalization for safety and stabilization  2. haldol, ativan and benadryl prns  3.increase  invega to 9mg daily at bedtime ; depakote er 2000mg at bedtime level 81 11/15 am.  4. routine checks appropriate (can make needs known)  5. hx dm2--follow up a1c; will monitor for hyeprlipidema  6. back pain--otc meds; wants motrin instead of tyleonol and will change 11/17

## 2022-11-17 NOTE — BH INPATIENT PSYCHIATRY PROGRESS NOTE - OTHER
"terrible".  irritable elevated sarcastic limited by thought disorder talks about cameras in room observing him and so on; preoccupied with defending females improving callieanging

## 2022-11-17 NOTE — BH INPATIENT PSYCHIATRY PROGRESS NOTE - NSBHFUPINTERVALHXFT_PSY_A_CORE
Staff report awake from [id number] and then from 0315 onwards.  They relate he is somewhat irritable, tries to avoid full compliance with meds butultimatley complies.  he is preoccupied with the shortcomings fo staff and their nationalitities and defending women on the unit and telling us that he wants to "help" people.

## 2022-11-18 PROCEDURE — 99231 SBSQ HOSP IP/OBS SF/LOW 25: CPT

## 2022-11-18 RX ADMIN — Medication 650 MILLIGRAM(S): at 17:56

## 2022-11-18 RX ADMIN — Medication 2 MILLIGRAM(S): at 21:14

## 2022-11-18 RX ADMIN — DIVALPROEX SODIUM 2000 MILLIGRAM(S): 500 TABLET, DELAYED RELEASE ORAL at 21:14

## 2022-11-18 RX ADMIN — PALIPERIDONE 9 MILLIGRAM(S): 1.5 TABLET, EXTENDED RELEASE ORAL at 08:56

## 2022-11-18 RX ADMIN — Medication 650 MILLIGRAM(S): at 18:30

## 2022-11-18 RX ADMIN — Medication 50 MILLIGRAM(S): at 21:15

## 2022-11-18 NOTE — BH INPATIENT PSYCHIATRY PROGRESS NOTE - NSBHFUPINTERVALHXFT_PSY_A_CORE
Staff report he slept last night.  He was compliant with depakote last night, his invega 9 was written to begin the AM and he was compliant with it also.  They report yesterday he was somewhat over active and yelling abou tme (I had to leave early) and was given an ativan 2mg po prn which made him dramatically more sleepy.  He is up and about and compliant with invega 9 this AM.

## 2022-11-18 NOTE — BH INPATIENT PSYCHIATRY PROGRESS NOTE - OTHER
limited by thought disorder "good".  improving irritable elevated sarcastic talks about cameras in room observing him and so on; preoccupied with defending females callieanging

## 2022-11-18 NOTE — BH INPATIENT PSYCHIATRY PROGRESS NOTE - NSBHASSESSSUMMFT_PSY_ALL_CORE
Patient is a 62 year old male, domiciled alone, unemployed, , noncaregiver, PPHx with working diagnoses of: Bipolar 1 disorder, MRE manic and depressive disorder NOS, 1 prior psychiatric hospitalization at [facility] (10/23-11/26/2008), denies prior suicide attempts, not in outpt tx, denies hx of violence, denies hx of legal issues/prior arrests, denies substance use/prior withdrawal/DTs, PMH of T2DM, chronic back pain, +family history, +hx treatment response to depakote and lithium brought in by self presenting with multiple medical complaints found to be manic, irritable and disorganized.  He was admitted on a 939 legal status.  He has limited sleep, profuse and pressured verbal production, tangentiality/flight of ideas and an irritable labile dramatic affect.  He is unable to care for self.  He slept (11/2) after we increased depakote er to 1500mg at bedtime.  He was still manic and unable to care for self but with some mild improvement and a steady state depakote level of 65.  We increased depakote to 2000mg at bedtime and risperidone to 3mg at bedtime with some mild improvement.  He was still symptomatic on this regimen and with a depakote level of 81.  We transitioned from risperidone 3 to invega 6 at bedtime to minimize risk of orthostasis while preserving bedtime dosing. He refused meds last night for unclkear reasons.  He was showing some improved impulsse control and realtedness but sleep was poor. Correction invega change to invega to 9mg daily first dose 11/18.  He slept last night    1. Continue inpatient hospitalization for safety and stabilization  2. haldol, ativan and benadryl prns  3.iinvega 9mg daily (first dose 11.18)  ; depakote er 2000mg at bedtime level 81 11/15 am.  4. routine checks appropriate (can make needs known)  5. hx dm2--follow up a1c; will monitor for hyeprlipidema  6. back pain--otc meds; wants motrin instead of tyleonol and will change 11/17

## 2022-11-18 NOTE — BH INPATIENT PSYCHIATRY PROGRESS NOTE - CURRENT MEDICATION
MEDICATIONS  (STANDING):  divalproex ER 2000 milliGRAM(s) Oral at bedtime  paliperidone ER 9 milliGRAM(s) Oral daily    MEDICATIONS  (PRN):  acetaminophen    Suspension .. 650 milliGRAM(s) Oral every 6 hours PRN Temp greater or equal to 38C (100.4F), Mild Pain (1 - 3), Moderate Pain (4 - 6)  diphenhydrAMINE 50 milliGRAM(s) Oral every 6 hours PRN agitation  diphenhydrAMINE Injectable 50 milliGRAM(s) IntraMuscular once PRN agitation  haloperidol     Tablet 5 milliGRAM(s) Oral every 6 hours PRN agitation  haloperidol    Injectable 5 milliGRAM(s) IntraMuscular once PRN Agitation  ibuprofen  Tablet. 600 milliGRAM(s) Oral every 6 hours PRN Mild Pain (1 - 3), Moderate Pain (4 - 6)  LORazepam     Tablet 2 milliGRAM(s) Oral every 6 hours PRN Agitation  LORazepam   Injectable 2 milliGRAM(s) IntraMuscular Once PRN Agitation  melatonin. 5 milliGRAM(s) Oral at bedtime PRN Insomnia

## 2022-11-19 RX ADMIN — PALIPERIDONE 9 MILLIGRAM(S): 1.5 TABLET, EXTENDED RELEASE ORAL at 09:45

## 2022-11-19 RX ADMIN — Medication 650 MILLIGRAM(S): at 00:55

## 2022-11-19 RX ADMIN — Medication 650 MILLIGRAM(S): at 01:55

## 2022-11-19 RX ADMIN — Medication 650 MILLIGRAM(S): at 18:51

## 2022-11-19 RX ADMIN — Medication 5 MILLIGRAM(S): at 21:25

## 2022-11-19 RX ADMIN — DIVALPROEX SODIUM 2000 MILLIGRAM(S): 500 TABLET, DELAYED RELEASE ORAL at 21:24

## 2022-11-20 RX ADMIN — Medication 650 MILLIGRAM(S): at 16:10

## 2022-11-20 RX ADMIN — Medication 5 MILLIGRAM(S): at 20:28

## 2022-11-20 RX ADMIN — PALIPERIDONE 9 MILLIGRAM(S): 1.5 TABLET, EXTENDED RELEASE ORAL at 08:27

## 2022-11-20 RX ADMIN — Medication 650 MILLIGRAM(S): at 23:07

## 2022-11-20 RX ADMIN — DIVALPROEX SODIUM 2000 MILLIGRAM(S): 500 TABLET, DELAYED RELEASE ORAL at 20:28

## 2022-11-21 RX ORDER — PALIPERIDONE 1.5 MG/1
12 TABLET, EXTENDED RELEASE ORAL DAILY
Refills: 0 | Status: DISCONTINUED | OUTPATIENT
Start: 2022-11-22 | End: 2022-11-30

## 2022-11-21 RX ADMIN — Medication 650 MILLIGRAM(S): at 00:07

## 2022-11-21 RX ADMIN — Medication 650 MILLIGRAM(S): at 22:17

## 2022-11-21 RX ADMIN — PALIPERIDONE 9 MILLIGRAM(S): 1.5 TABLET, EXTENDED RELEASE ORAL at 08:28

## 2022-11-21 RX ADMIN — DIVALPROEX SODIUM 2000 MILLIGRAM(S): 500 TABLET, DELAYED RELEASE ORAL at 20:27

## 2022-11-21 RX ADMIN — Medication 650 MILLIGRAM(S): at 20:26

## 2022-11-21 NOTE — BH INPATIENT PSYCHIATRY PROGRESS NOTE - NSBHASSESSSUMMFT_PSY_ALL_CORE
Patient is a 62 year old male, domiciled alone, unemployed, , noncaregiver, PPHx with working diagnoses of: Bipolar 1 disorder, MRE manic and depressive disorder NOS, 1 prior psychiatric hospitalization at [facility] (10/23-11/26/2008), denies prior suicide attempts, not in outpt tx, denies hx of violence, denies hx of legal issues/prior arrests, denies substance use/prior withdrawal/DTs, PMH of T2DM, chronic back pain, +family history, +hx treatment response to depakote and lithium brought in by self presenting with multiple medical complaints found to be manic, irritable and disorganized.  He was admitted on a 939 legal status.  He has limited sleep, profuse and pressured verbal production, tangentiality/flight of ideas and an irritable labile dramatic affect.  He is unable to care for self.  He slept (11/2) after we increased depakote er to 1500mg at bedtime.  He was still manic and unable to care for self but with some mild improvement and a steady state depakote level of 65.  We increased depakote to 2000mg at bedtime and risperidone to 3mg at bedtime with some mild improvement.  He was still symptomatic on this regimen and with a depakote level of 81.  We transitioned from risperidone 3 to invega 6 at bedtime to minimize risk of orthostasis while preserving bedtime dosing. He refused meds last night for unclkear reasons.  He was showing some improved impulsse control and realtedness but sleep was poor. we continued to titrate paliperidone with some mild but limited improvement.      1. Continue inpatient hospitalization for safety and stabilization  2. haldol, ativan and benadryl prns  3.increase paliperidone to 12mg daily, first dose tomorrow AM; depakote er 2000mg at bedtime level 81 11/15 am.  4. routine checks appropriate (can make needs known)  5. hx dm2--follow up a1c; will monitor for hyeprlipidema  6. back pain--otc meds; wants motrin instead of tyleonol and will change 11/17

## 2022-11-21 NOTE — BH INPATIENT PSYCHIATRY PROGRESS NOTE - CURRENT MEDICATION
MEDICATIONS  (STANDING):  divalproex ER 2000 milliGRAM(s) Oral at bedtime    MEDICATIONS  (PRN):  acetaminophen    Suspension .. 650 milliGRAM(s) Oral every 6 hours PRN Temp greater or equal to 38C (100.4F), Mild Pain (1 - 3), Moderate Pain (4 - 6)  diphenhydrAMINE 50 milliGRAM(s) Oral every 6 hours PRN agitation  diphenhydrAMINE Injectable 50 milliGRAM(s) IntraMuscular once PRN agitation  haloperidol     Tablet 5 milliGRAM(s) Oral every 6 hours PRN agitation  haloperidol    Injectable 5 milliGRAM(s) IntraMuscular once PRN Agitation  ibuprofen  Tablet. 600 milliGRAM(s) Oral every 6 hours PRN Mild Pain (1 - 3), Moderate Pain (4 - 6)  LORazepam     Tablet 2 milliGRAM(s) Oral every 6 hours PRN Agitation  LORazepam   Injectable 2 milliGRAM(s) IntraMuscular Once PRN Agitation  melatonin. 5 milliGRAM(s) Oral at bedtime PRN Insomnia

## 2022-11-21 NOTE — BH INPATIENT PSYCHIATRY PROGRESS NOTE - OTHER
limited by thought disorder "good".  improving preoccupied with defending females, goes through his notebook--"this is about time travel", this is a picture of [employer] identical to the one I did in high school" callieanging less irritable and less sarcastic today

## 2022-11-21 NOTE — BH INPATIENT PSYCHIATRY PROGRESS NOTE - NSBHFUPINTERVALHXFT_PSY_A_CORE
Staff report continues poor sleep awake after 0430 friday night into saturday, after 0100 saturday night into sunday morning and after 0230 sunday night into monday morning. At and benadryl prn  on 11/18 at night, melatonin th enext two nights.

## 2022-11-22 PROCEDURE — 90853 GROUP PSYCHOTHERAPY: CPT

## 2022-11-22 RX ADMIN — Medication 650 MILLIGRAM(S): at 20:37

## 2022-11-22 RX ADMIN — Medication 650 MILLIGRAM(S): at 02:35

## 2022-11-22 RX ADMIN — DIVALPROEX SODIUM 2000 MILLIGRAM(S): 500 TABLET, DELAYED RELEASE ORAL at 20:11

## 2022-11-22 RX ADMIN — PALIPERIDONE 12 MILLIGRAM(S): 1.5 TABLET, EXTENDED RELEASE ORAL at 08:42

## 2022-11-22 RX ADMIN — Medication 650 MILLIGRAM(S): at 19:44

## 2022-11-22 NOTE — BH INPATIENT PSYCHIATRY PROGRESS NOTE - CURRENT MEDICATION
MEDICATIONS  (STANDING):  divalproex ER 2000 milliGRAM(s) Oral at bedtime  paliperidone ER 12 milliGRAM(s) Oral daily    MEDICATIONS  (PRN):  acetaminophen    Suspension .. 650 milliGRAM(s) Oral every 6 hours PRN Temp greater or equal to 38C (100.4F), Mild Pain (1 - 3), Moderate Pain (4 - 6)  diphenhydrAMINE 50 milliGRAM(s) Oral every 6 hours PRN agitation  diphenhydrAMINE Injectable 50 milliGRAM(s) IntraMuscular once PRN agitation  haloperidol     Tablet 5 milliGRAM(s) Oral every 6 hours PRN agitation  haloperidol    Injectable 5 milliGRAM(s) IntraMuscular once PRN Agitation  ibuprofen  Tablet. 600 milliGRAM(s) Oral every 6 hours PRN Mild Pain (1 - 3), Moderate Pain (4 - 6)  LORazepam     Tablet 2 milliGRAM(s) Oral every 6 hours PRN Agitation  LORazepam   Injectable 2 milliGRAM(s) IntraMuscular Once PRN Agitation  melatonin. 5 milliGRAM(s) Oral at bedtime PRN Insomnia

## 2022-11-22 NOTE — BH INPATIENT PSYCHIATRY PROGRESS NOTE - OTHER
preoccupied with defending females, goes through his notebook--"this is about time travel", this is a picture of [employer] identical to the one I did in high school", people on the unit perhaps being or at least reminding him of his family. less irritable and less sarcastic but still wide ranging limited by thought disorder "good".  improving callieanging

## 2022-11-22 NOTE — BH INPATIENT PSYCHIATRY PROGRESS NOTE - NSBHFUPINTERVALHXFT_PSY_A_CORE
SStaff continue to report that he is manic with poor sleep (slept 7790-2045 and then 1833-6980).  He uses tylenol prns.  Today is day #2 of paliperidone 12mg daily.  SStaff continue to report that he is manic with poor sleep (slept 8485-1528 and then 3189-3729).  He uses tylenol prns.  Today is day #1 of paliperidone 12mg daily.

## 2022-11-22 NOTE — BH INPATIENT PSYCHIATRY PROGRESS NOTE - NSBHASSESSSUMMFT_PSY_ALL_CORE
Patient is a 62 year old male, domiciled alone, unemployed, , noncaregiver, PPHx with working diagnoses of: Bipolar 1 disorder, MRE manic and depressive disorder NOS, 1 prior psychiatric hospitalization at [facility] (10/23-11/26/2008), denies prior suicide attempts, not in outpt tx, denies hx of violence, denies hx of legal issues/prior arrests, denies substance use/prior withdrawal/DTs, PMH of T2DM, chronic back pain, +family history, +hx treatment response to depakote and lithium brought in by self presenting with multiple medical complaints found to be manic, irritable and disorganized.  He was admitted on a 939 legal status.  He has limited sleep, profuse and pressured verbal production, tangentiality/flight of ideas and an irritable labile dramatic affect.  He is unable to care for self.  He slept (11/2) after we increased depakote er to 1500mg at bedtime.  He was still manic and unable to care for self but with some mild improvement and a steady state depakote level of 65.  We increased depakote to 2000mg at bedtime and risperidone to 3mg at bedtime with some mild improvement.  He was still symptomatic on this regimen and with a depakote level of 81.  We transitioned from risperidone 3 to invega 6 at bedtime to minimize risk of orthostasis while preserving bedtime dosing. He refused meds last night for unclkear reasons.  He was showing some improved impulsse control and realtedness but sleep was poor. we continued to titrate paliperidone with some mild but limited improvement.  Would start to consider addition of lithium or ect.      1. Continue inpatient hospitalization for safety and stabilization  2. haldol, ativan and benadryl prns  3.paliperidone 12mg daily, first dose tomorrow AM; depakote er 2000mg at bedtime level 81 11/15 am.  4. routine checks appropriate (can make needs known)  5. hx dm2--follow up a1c; will monitor for hyeprlipidema  6. back pain--otc meds; wants motrin instead of tyleonol and will change 11/17

## 2022-11-23 PROCEDURE — 99231 SBSQ HOSP IP/OBS SF/LOW 25: CPT

## 2022-11-23 RX ORDER — FUROSEMIDE 40 MG
20 TABLET ORAL DAILY
Refills: 0 | Status: DISCONTINUED | OUTPATIENT
Start: 2022-11-23 | End: 2022-11-29

## 2022-11-23 RX ORDER — FUROSEMIDE 40 MG
20 TABLET ORAL ONCE
Refills: 0 | Status: COMPLETED | OUTPATIENT
Start: 2022-11-23 | End: 2022-11-23

## 2022-11-23 RX ADMIN — Medication 650 MILLIGRAM(S): at 20:33

## 2022-11-23 RX ADMIN — Medication 650 MILLIGRAM(S): at 11:51

## 2022-11-23 RX ADMIN — PALIPERIDONE 12 MILLIGRAM(S): 1.5 TABLET, EXTENDED RELEASE ORAL at 08:31

## 2022-11-23 RX ADMIN — Medication 650 MILLIGRAM(S): at 12:30

## 2022-11-23 RX ADMIN — DIVALPROEX SODIUM 2000 MILLIGRAM(S): 500 TABLET, DELAYED RELEASE ORAL at 20:31

## 2022-11-23 RX ADMIN — Medication 650 MILLIGRAM(S): at 06:00

## 2022-11-23 RX ADMIN — Medication 20 MILLIGRAM(S): at 12:46

## 2022-11-23 NOTE — BH INPATIENT PSYCHIATRY PROGRESS NOTE - NSBHFUPINTERVALHXFT_PSY_A_CORE
Chart reviewed and case discussed with treatment team. No events reported overnight. Sleep is poor and appetite is fair. Patient had multiple complaints about his treatment and staff as well as somatic complaints. Bilateral swelling noted on both feet and patient given x1 dose of Lasix as per Dr. Ramos. He kept repeating "I have no bipolar" and denies any mental health issues. Patient remains grossly disorganized labile and tangential. Patient denies any SI/HI or AVH. Patient is compliant with medications, no adverse effects reported.

## 2022-11-23 NOTE — BH INPATIENT PSYCHIATRY PROGRESS NOTE - NSBHASSESSSUMMFT_PSY_ALL_CORE
Patient is a 62 year old male, domiciled alone, unemployed, , noncaregiver, PPHx with working diagnoses of: Bipolar 1 disorder, MRE manic and depressive disorder NOS, 1 prior psychiatric hospitalization at [facility] (10/23-11/26/2008), denies prior suicide attempts, not in outpt tx, denies hx of violence, denies hx of legal issues/prior arrests, denies substance use/prior withdrawal/DTs, PMH of T2DM, chronic back pain, +family history, +hx treatment response to depakote and lithium brought in by self presenting with multiple medical complaints found to be manic, irritable and disorganized.  He was admitted on a 939 legal status.  He has limited sleep, profuse and pressured verbal production, tangentiality/flight of ideas and an irritable labile dramatic affect.  He is unable to care for self.  He slept (11/2) after we increased depakote er to 1500mg at bedtime.  He was still manic and unable to care for self but with some mild improvement and a steady state depakote level of 65.  We increased depakote to 2000mg at bedtime and risperidone to 3mg at bedtime with some mild improvement.  He was still symptomatic on this regimen and with a depakote level of 81.  We transitioned from risperidone 3 to invega 6 at bedtime to minimize risk of orthostasis while preserving bedtime dosing. He refused meds last night for unclkear reasons.  He was showing some improved impulsse control and realtedness but sleep was poor. we continued to titrate paliperidone with some mild but limited improvement.  Would start to consider addition of lithium or ect.      On assessment, patient remains manic, denies any SI. He was started on Lasix as recommended by Dr. Ramos.   Continue with plan as per primary team:    1. Continue inpatient hospitalization for safety and stabilization  2. haldol, ativan and benadryl prns  3.paliperidone 12mg daily, first dose tomorrow AM; depakote er 2000mg at bedtime level 81 11/15 am.  4. routine checks appropriate (can make needs known)  5. hx dm2--follow up a1c; will monitor for hyeprlipidema  6. back pain--otc meds; wants motrin instead of tyleonol and will change 11/17

## 2022-11-23 NOTE — BH INPATIENT PSYCHIATRY PROGRESS NOTE - CURRENT MEDICATION
MEDICATIONS  (STANDING):  divalproex ER 2000 milliGRAM(s) Oral at bedtime  furosemide    Tablet 20 milliGRAM(s) Oral once  furosemide    Tablet 20 milliGRAM(s) Oral daily  paliperidone ER 12 milliGRAM(s) Oral daily    MEDICATIONS  (PRN):  acetaminophen    Suspension .. 650 milliGRAM(s) Oral every 6 hours PRN Temp greater or equal to 38C (100.4F), Mild Pain (1 - 3), Moderate Pain (4 - 6)  diphenhydrAMINE 50 milliGRAM(s) Oral every 6 hours PRN agitation  diphenhydrAMINE Injectable 50 milliGRAM(s) IntraMuscular once PRN agitation  haloperidol     Tablet 5 milliGRAM(s) Oral every 6 hours PRN agitation  haloperidol    Injectable 5 milliGRAM(s) IntraMuscular once PRN Agitation  ibuprofen  Tablet. 600 milliGRAM(s) Oral every 6 hours PRN Mild Pain (1 - 3), Moderate Pain (4 - 6)  LORazepam     Tablet 2 milliGRAM(s) Oral every 6 hours PRN Agitation  LORazepam   Injectable 2 milliGRAM(s) IntraMuscular Once PRN Agitation  melatonin. 5 milliGRAM(s) Oral at bedtime PRN Insomnia

## 2022-11-23 NOTE — PHYSICAL THERAPY INITIAL EVALUATION ADULT - PLANNED THERAPY INTERVENTIONS, PT EVAL
balance training/gait training/lumbar stabilization/manual therapy techniques/neuromuscular re-education/strengthening/stretching

## 2022-11-23 NOTE — PHYSICAL THERAPY INITIAL EVALUATION ADULT - TIMED UP AND GO TEST, REHAB EVAL
0730- Bedside and Verbal shift change report given to Amarjit Cruz RN (oncoming nurse) by Chloe Felty, RN (offgoing nurse). Report included the following information SBAR, Kardex, ED Summary, OR Summary, Procedure Summary, Intake/Output, MAR, Accordion, and Recent Results. This patient was assisted with Intentional Toileting every 2 hours during this shift. Documentation of ambulation and output reflected on Flowsheet. 1930-Bedside and Verbal shift change report given to RN (oncoming nurse) by Amarjit Cruz RN(offgoing nurse). Report included the following information SBAR, Kardex, ED Summary, OR Summary, Procedure Summary, Intake/Output, MAR, Accordion, Recent Results, and Med Rec Status. 25 seconds

## 2022-11-23 NOTE — PHYSICAL THERAPY INITIAL EVALUATION ADULT - PERTINENT HX OF CURRENT PROBLEM, REHAB EVAL
Patient is a 62 year old male, domiciled alone, unemployed, , noncaregiver, PPHx with working diagnoses of: Bipolar 1 disorder, MRE manic and depressive disorder NOS, 1 prior psychiatric hospitalization at [facility] (10/23-11/26/2008), denies prior suicide attempts, not in outpt tx, denies hx of violence, denies hx of legal issues/prior arrests, denies substance use/prior withdrawal/DTs, PMH of T2DM, chronic back pain, brought in by self presenting with multiple medical complaints. Patient referred to Physical therapy for gait assessment

## 2022-11-24 RX ADMIN — Medication 650 MILLIGRAM(S): at 03:23

## 2022-11-24 RX ADMIN — DIVALPROEX SODIUM 2000 MILLIGRAM(S): 500 TABLET, DELAYED RELEASE ORAL at 20:29

## 2022-11-24 RX ADMIN — Medication 650 MILLIGRAM(S): at 14:55

## 2022-11-24 RX ADMIN — PALIPERIDONE 12 MILLIGRAM(S): 1.5 TABLET, EXTENDED RELEASE ORAL at 08:08

## 2022-11-24 RX ADMIN — Medication 650 MILLIGRAM(S): at 20:29

## 2022-11-24 RX ADMIN — Medication 20 MILLIGRAM(S): at 08:08

## 2022-11-24 RX ADMIN — Medication 650 MILLIGRAM(S): at 04:00

## 2022-11-25 PROCEDURE — 99231 SBSQ HOSP IP/OBS SF/LOW 25: CPT

## 2022-11-25 RX ORDER — POLYETHYLENE GLYCOL 3350 17 G/17G
17 POWDER, FOR SOLUTION ORAL DAILY
Refills: 0 | Status: DISCONTINUED | OUTPATIENT
Start: 2022-11-25 | End: 2022-12-16

## 2022-11-25 RX ORDER — LACTULOSE 10 G/15ML
10 SOLUTION ORAL ONCE
Refills: 0 | Status: COMPLETED | OUTPATIENT
Start: 2022-11-25 | End: 2022-11-26

## 2022-11-25 RX ADMIN — Medication 20 MILLIGRAM(S): at 08:29

## 2022-11-25 RX ADMIN — DIVALPROEX SODIUM 2000 MILLIGRAM(S): 500 TABLET, DELAYED RELEASE ORAL at 20:36

## 2022-11-25 RX ADMIN — PALIPERIDONE 12 MILLIGRAM(S): 1.5 TABLET, EXTENDED RELEASE ORAL at 08:29

## 2022-11-25 NOTE — BH INPATIENT PSYCHIATRY PROGRESS NOTE - NSBHASSESSSUMMFT_PSY_ALL_CORE
Patient is a 62 year old male, domiciled alone, unemployed, , noncaregiver, PPHx with working diagnoses of: Bipolar 1 disorder, MRE manic and depressive disorder NOS, 1 prior psychiatric hospitalization at [facility] (10/23-11/26/2008), denies prior suicide attempts, not in outpt tx, denies hx of violence, denies hx of legal issues/prior arrests, denies substance use/prior withdrawal/DTs, PMH of T2DM, chronic back pain, +family history, +hx treatment response to depakote and lithium brought in by self presenting with multiple medical complaints found to be manic, irritable and disorganized.  He was admitted on a 939 legal status.  He has limited sleep, profuse and pressured verbal production, tangentiality/flight of ideas and an irritable labile dramatic affect.  He is unable to care for self.  He slept (11/2) after we increased depakote er to 1500mg at bedtime.  He was still manic and unable to care for self but with some mild improvement and a steady state depakote level of 65.  We increased depakote to 2000mg at bedtime and risperidone to 3mg at bedtime with some mild improvement.  He was still symptomatic on this regimen and with a depakote level of 81.  We transitioned from risperidone 3 to invega 6 at bedtime to minimize risk of orthostasis while preserving bedtime dosing. He refused meds last night for unclkear reasons.  He was showing some improved impulsse control and realtedness but sleep was poor. we continued to titrate paliperidone with some mild but limited improvement.  Would start to consider addition of lithium or ect.      On assessment, patient remains disorganized and manic, he denies any SI. Lactulose x1 dose ordered for constipation and miralax changed to standing.   Continue with plan as per primary team:    1. Continue inpatient hospitalization for safety and stabilization  2. haldol, ativan and benadryl prns  3.paliperidone 12mg daily, first dose tomorrow AM; depakote er 2000mg at bedtime level 81 11/15 am.  4. routine checks appropriate (can make needs known)  5. hx dm2--follow up a1c; will monitor for hyeprlipidema  6. back pain--otc meds; wants motrin instead of tyleonol and will change 11/17 Patient is a 62 year old male, domiciled alone, unemployed, , noncaregiver, PPHx with working diagnoses of: Bipolar 1 disorder, MRE manic and depressive disorder NOS, 1 prior psychiatric hospitalization at [facility] (10/23-11/26/2008), denies prior suicide attempts, not in outpt tx, denies hx of violence, denies hx of legal issues/prior arrests, denies substance use/prior withdrawal/DTs, PMH of T2DM, chronic back pain, +family history, +hx treatment response to depakote and lithium brought in by self presenting with multiple medical complaints found to be manic, irritable and disorganized.  He was admitted on a 939 legal status.  He has limited sleep, profuse and pressured verbal production, tangentiality/flight of ideas and an irritable labile dramatic affect.  He is unable to care for self.  He slept (11/2) after we increased depakote er to 1500mg at bedtime.  He was still manic and unable to care for self but with some mild improvement and a steady state depakote level of 65.  We increased depakote to 2000mg at bedtime and risperidone to 3mg at bedtime with some mild improvement.  He was still symptomatic on this regimen and with a depakote level of 81.  We transitioned from risperidone 3 to invega 6 at bedtime to minimize risk of orthostasis while preserving bedtime dosing. He refused meds last night for unclkear reasons.  He was showing some improved impulsse control and realtedness but sleep was poor. we continued to titrate paliperidone with some mild but limited improvement.  Would start to consider addition of lithium or ect.      On assessment, patient remains disorganized and manic, he denies any SI. Lactulose x1 dose ordered for constipation and miralax added to current regimen.   Continue with plan as per primary team:    1. Continue inpatient hospitalization for safety and stabilization  2. haldol, ativan and benadryl prns  3.paliperidone 12mg daily, first dose tomorrow AM; depakote er 2000mg at bedtime level 81 11/15 am.  4. routine checks appropriate (can make needs known)  5. hx dm2--follow up a1c; will monitor for hyeprlipidema  6. back pain--otc meds; wants motrin instead of tyleonol and will change 11/17

## 2022-11-25 NOTE — BH INPATIENT PSYCHIATRY PROGRESS NOTE - CURRENT MEDICATION
MEDICATIONS  (STANDING):  divalproex ER 2000 milliGRAM(s) Oral at bedtime  furosemide    Tablet 20 milliGRAM(s) Oral daily  paliperidone ER 12 milliGRAM(s) Oral daily    MEDICATIONS  (PRN):  acetaminophen    Suspension .. 650 milliGRAM(s) Oral every 6 hours PRN Temp greater or equal to 38C (100.4F), Mild Pain (1 - 3), Moderate Pain (4 - 6)  diphenhydrAMINE 50 milliGRAM(s) Oral every 6 hours PRN agitation  diphenhydrAMINE Injectable 50 milliGRAM(s) IntraMuscular once PRN agitation  haloperidol     Tablet 5 milliGRAM(s) Oral every 6 hours PRN agitation  haloperidol    Injectable 5 milliGRAM(s) IntraMuscular once PRN Agitation  ibuprofen  Tablet. 600 milliGRAM(s) Oral every 6 hours PRN Mild Pain (1 - 3), Moderate Pain (4 - 6)  LORazepam     Tablet 2 milliGRAM(s) Oral every 6 hours PRN Agitation  LORazepam   Injectable 2 milliGRAM(s) IntraMuscular Once PRN Agitation  melatonin. 5 milliGRAM(s) Oral at bedtime PRN Insomnia   MEDICATIONS  (STANDING):  divalproex ER 2000 milliGRAM(s) Oral at bedtime  furosemide    Tablet 20 milliGRAM(s) Oral daily  lactulose Syrup 10 Gram(s) Oral once  paliperidone ER 12 milliGRAM(s) Oral daily    MEDICATIONS  (PRN):  acetaminophen    Suspension .. 650 milliGRAM(s) Oral every 6 hours PRN Temp greater or equal to 38C (100.4F), Mild Pain (1 - 3), Moderate Pain (4 - 6)  diphenhydrAMINE 50 milliGRAM(s) Oral every 6 hours PRN agitation  diphenhydrAMINE Injectable 50 milliGRAM(s) IntraMuscular once PRN agitation  haloperidol     Tablet 5 milliGRAM(s) Oral every 6 hours PRN agitation  haloperidol    Injectable 5 milliGRAM(s) IntraMuscular once PRN Agitation  ibuprofen  Tablet. 600 milliGRAM(s) Oral every 6 hours PRN Mild Pain (1 - 3), Moderate Pain (4 - 6)  LORazepam     Tablet 2 milliGRAM(s) Oral every 6 hours PRN Agitation  LORazepam   Injectable 2 milliGRAM(s) IntraMuscular Once PRN Agitation  melatonin. 5 milliGRAM(s) Oral at bedtime PRN Insomnia

## 2022-11-25 NOTE — BH INPATIENT PSYCHIATRY PROGRESS NOTE - NSBHFUPINTERVALHXFT_PSY_A_CORE
Chart reviewed and case discussed with treatment team. No events reported overnight. Sleep is poor and appetite is fair. Patient remains tangential and labile. Patient denies any SI/HI or AVH. He became annoyed and upset because writer asked him these questions. He is complaining of constipation, X1 dose of Lactulose ordered. Patient is compliant with medications, no adverse effects reported.

## 2022-11-26 RX ADMIN — Medication 650 MILLIGRAM(S): at 20:33

## 2022-11-26 RX ADMIN — PALIPERIDONE 12 MILLIGRAM(S): 1.5 TABLET, EXTENDED RELEASE ORAL at 08:23

## 2022-11-26 RX ADMIN — Medication 20 MILLIGRAM(S): at 08:23

## 2022-11-26 RX ADMIN — LACTULOSE 10 GRAM(S): 10 SOLUTION ORAL at 20:33

## 2022-11-26 RX ADMIN — Medication 650 MILLIGRAM(S): at 20:56

## 2022-11-26 RX ADMIN — DIVALPROEX SODIUM 2000 MILLIGRAM(S): 500 TABLET, DELAYED RELEASE ORAL at 20:33

## 2022-11-26 NOTE — BH INPATIENT PSYCHIATRY PROGRESS NOTE - NSBHASSESSSUMMFT_PSY_ALL_CORE
no Patient is a 62 year old male, domiciled alone, unemployed, , noncaregiver, PPHx with working diagnoses of: Bipolar 1 disorder, MRE manic and depressive disorder NOS, 1 prior psychiatric hospitalization at [facility] (10/23-11/26/2008), denies prior suicide attempts, not in outpt tx, denies hx of violence, denies hx of legal issues/prior arrests, denies substance use/prior withdrawal/DTs, PMH of T2DM, chronic back pain, +family history, +hx treatment response to depakote and lithium brought in by self presenting with multiple medical complaints found to be manic, irritable and disorganized.  He was admitted on a 939 legal status.  He has limited sleep, profuse and pressured verbal production, tangentiality/flight of ideas and an irritable labile dramatic affect.  He is unable to care for self.  He slept last night (11/2) after we increased depakote er to 1500mg at bedtime.  Tonight is night #3 of depakote er 2000mg at bedtime and risperidone 2mg at bedtime.    Still manic and unable to care for self but with some mild improvement with a depakote level of 65    1. Continue 939 hospitalization for safety and stabilization  2. haldol, ativan and benadryl prns  3. risperidone 2mg at bedtime, depakote er 2000mg at bedtime  4. routine checks appropriate (can make needs known)  5. hx dm2--follow up a1c; will monitor for hyeprlipidema  6. back pain--otc meds.

## 2022-11-27 RX ADMIN — Medication 650 MILLIGRAM(S): at 23:28

## 2022-11-27 RX ADMIN — Medication 20 MILLIGRAM(S): at 08:48

## 2022-11-27 RX ADMIN — POLYETHYLENE GLYCOL 3350 17 GRAM(S): 17 POWDER, FOR SOLUTION ORAL at 08:58

## 2022-11-27 RX ADMIN — PALIPERIDONE 12 MILLIGRAM(S): 1.5 TABLET, EXTENDED RELEASE ORAL at 08:48

## 2022-11-27 RX ADMIN — Medication 650 MILLIGRAM(S): at 22:21

## 2022-11-27 RX ADMIN — DIVALPROEX SODIUM 2000 MILLIGRAM(S): 500 TABLET, DELAYED RELEASE ORAL at 20:20

## 2022-11-28 DIAGNOSIS — M79.89 OTHER SPECIFIED SOFT TISSUE DISORDERS: ICD-10-CM

## 2022-11-28 LAB
ANION GAP SERPL CALC-SCNC: 10 MMOL/L — SIGNIFICANT CHANGE UP (ref 7–14)
BUN SERPL-MCNC: 18 MG/DL — SIGNIFICANT CHANGE UP (ref 7–23)
CALCIUM SERPL-MCNC: 9 MG/DL — SIGNIFICANT CHANGE UP (ref 8.4–10.5)
CHLORIDE SERPL-SCNC: 100 MMOL/L — SIGNIFICANT CHANGE UP (ref 98–107)
CO2 SERPL-SCNC: 27 MMOL/L — SIGNIFICANT CHANGE UP (ref 22–31)
CREAT SERPL-MCNC: 0.85 MG/DL — SIGNIFICANT CHANGE UP (ref 0.5–1.3)
EGFR: 98 ML/MIN/1.73M2 — SIGNIFICANT CHANGE UP
GLUCOSE SERPL-MCNC: 135 MG/DL — HIGH (ref 70–99)
POTASSIUM SERPL-MCNC: 4.3 MMOL/L — SIGNIFICANT CHANGE UP (ref 3.5–5.3)
POTASSIUM SERPL-SCNC: 4.3 MMOL/L — SIGNIFICANT CHANGE UP (ref 3.5–5.3)
SODIUM SERPL-SCNC: 137 MMOL/L — SIGNIFICANT CHANGE UP (ref 135–145)

## 2022-11-28 PROCEDURE — 99232 SBSQ HOSP IP/OBS MODERATE 35: CPT

## 2022-11-28 PROCEDURE — 99223 1ST HOSP IP/OBS HIGH 75: CPT

## 2022-11-28 RX ORDER — LITHIUM CARBONATE 300 MG/1
300 TABLET, EXTENDED RELEASE ORAL
Refills: 0 | Status: DISCONTINUED | OUTPATIENT
Start: 2022-11-28 | End: 2022-12-05

## 2022-11-28 RX ORDER — SENNA PLUS 8.6 MG/1
2 TABLET ORAL AT BEDTIME
Refills: 0 | Status: DISCONTINUED | OUTPATIENT
Start: 2022-11-28 | End: 2022-12-02

## 2022-11-28 RX ORDER — DIVALPROEX SODIUM 500 MG/1
1500 TABLET, DELAYED RELEASE ORAL AT BEDTIME
Refills: 0 | Status: DISCONTINUED | OUTPATIENT
Start: 2022-11-28 | End: 2022-12-07

## 2022-11-28 RX ADMIN — LITHIUM CARBONATE 300 MILLIGRAM(S): 300 TABLET, EXTENDED RELEASE ORAL at 20:25

## 2022-11-28 RX ADMIN — POLYETHYLENE GLYCOL 3350 17 GRAM(S): 17 POWDER, FOR SOLUTION ORAL at 08:46

## 2022-11-28 RX ADMIN — PALIPERIDONE 12 MILLIGRAM(S): 1.5 TABLET, EXTENDED RELEASE ORAL at 08:18

## 2022-11-28 RX ADMIN — SENNA PLUS 2 TABLET(S): 8.6 TABLET ORAL at 20:25

## 2022-11-28 RX ADMIN — DIVALPROEX SODIUM 1500 MILLIGRAM(S): 500 TABLET, DELAYED RELEASE ORAL at 20:25

## 2022-11-28 RX ADMIN — Medication 20 MILLIGRAM(S): at 08:18

## 2022-11-28 RX ADMIN — Medication 650 MILLIGRAM(S): at 20:27

## 2022-11-28 RX ADMIN — Medication 650 MILLIGRAM(S): at 21:27

## 2022-11-28 NOTE — BH INPATIENT PSYCHIATRY PROGRESS NOTE - NSBHFUPINTERVALHXFT_PSY_A_CORE
Staff report patient calm but describe him as manic with poor sleep over the last few days continuing. He is pressured and tangential with a labile affect with us.  Preoccupied with flatulence, asks for a different bowel regimen.  Was also started on furosemide for chronic pedal edema in my absnce. Given that he is still symptomatic and unable to care for himself we discussed and he agreed to a trial of lithium in return for reducing depakote from 2000 at bedtime to 1500 at bedtime.  As he is still symptomatic and I cannot argue that depakote was completely effective, this seems fair, especially as on chart review reports response to lithium.  Patient started on loo pdiuretic which have small but unpredictable effects of lithium reabsorbtion.  Will start low and go slow with lithium 300 twice daily, reduce depakote to 1500 at bedtime.

## 2022-11-28 NOTE — CONSULT NOTE ADULT - ASSESSMENT
63yo M with PMH of T2DM and bipolar d/o who is admitted to [facility] after presenting to [facility] ED for LE swelling and complaint of his psychiatric condition. He is admitted for manic episode of his bipolar disorder type 1.     #LE swelling  - was started on furosemide 20mg po daily, can continue  - BMP one week after initiating furosemide was acceptable, without electrolyte abnormalities or elevation in Cr  - compression stockings  - encourage ambulation    #Bipolar disorder type 1 with manic episode  - management per primary psych team    #T2DM  - A1c 6.4%  - not currently on DM medications  - carb-controlled diet  - LDL acceptable at 82  - ASCVD risk 14.3% based on current data - would be an appropriate candidate for moderate intensity statin like atorvastatin 20mg po qhs or rosuvastatin 10mg po qhs

## 2022-11-28 NOTE — BH INPATIENT PSYCHIATRY PROGRESS NOTE - OTHER
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

## 2022-11-28 NOTE — BH INPATIENT PSYCHIATRY PROGRESS NOTE - CURRENT MEDICATION
MEDICATIONS  (STANDING):  divalproex ER 1500 milliGRAM(s) Oral at bedtime  furosemide    Tablet 20 milliGRAM(s) Oral daily  lithium 300 milliGRAM(s) Oral two times a day  paliperidone ER 12 milliGRAM(s) Oral daily  polyethylene glycol 3350 17 Gram(s) Oral daily    MEDICATIONS  (PRN):  acetaminophen    Suspension .. 650 milliGRAM(s) Oral every 6 hours PRN Temp greater or equal to 38C (100.4F), Mild Pain (1 - 3), Moderate Pain (4 - 6)  diphenhydrAMINE 50 milliGRAM(s) Oral every 6 hours PRN agitation  diphenhydrAMINE Injectable 50 milliGRAM(s) IntraMuscular once PRN agitation  haloperidol     Tablet 5 milliGRAM(s) Oral every 6 hours PRN agitation  haloperidol    Injectable 5 milliGRAM(s) IntraMuscular once PRN Agitation  ibuprofen  Tablet. 600 milliGRAM(s) Oral every 6 hours PRN Mild Pain (1 - 3), Moderate Pain (4 - 6)  LORazepam     Tablet 2 milliGRAM(s) Oral every 6 hours PRN Agitation  LORazepam   Injectable 2 milliGRAM(s) IntraMuscular Once PRN Agitation  melatonin. 5 milliGRAM(s) Oral at bedtime PRN Insomnia

## 2022-11-28 NOTE — BH INPATIENT PSYCHIATRY PROGRESS NOTE - NSBHASSESSSUMMFT_PSY_ALL_CORE
Patient is a 62 year old male, domiciled alone, unemployed, , noncaregiver, PPHx with working diagnoses of: Bipolar 1 disorder, MRE manic and depressive disorder NOS, 1 prior psychiatric hospitalization at [facility] (10/23-11/26/2008), denies prior suicide attempts, not in outpt tx, denies hx of violence, denies hx of legal issues/prior arrests, denies substance use/prior withdrawal/DTs, PMH of T2DM, chronic back pain, +family history, +hx treatment response to depakote and lithium brought in by self presenting with multiple medical complaints found to be manic, irritable and disorganized.  He was admitted on a 939 legal status.  He has limited sleep, profuse and pressured verbal production, tangentiality/flight of ideas and an irritable labile dramatic affect.  He is unable to care for self.  He slept (11/2) after we increased depakote er to 1500mg at bedtime.  He was still manic and unable to care for self but with some mild improvement and a steady state depakote level of 65.  We increased depakote to 2000mg at bedtime and risperidone to 3mg at bedtime with some mild improvement.  He was still symptomatic on this regimen and with a depakote level of 81.  We transitioned from risperidone 3 to invega 6 at bedtime to minimize risk of orthostasis while preserving bedtime dosing. He refused meds last night for unclkear reasons.  He was showing some improved impulsse control and realtedness but sleep was poor. we continued to titrate paliperidone with some mild but limited improvement.  He continued with little progress so we are starting lithium 300 twice daily and will reduce depkaote to 1500 at bedtime per his request.     1. Continue inpatient hospitalization for safety and stabilization  2. haldol, ativan and benadryl prns  3.lithium 300 twice daily fi4rst dose pm of 11/128; paliperidone 12mg daily; depakote er 1500 at bedtime level (level on 2000 at bedtime was 81)  4. routine checks appropriate (can make needs known)  5. hx dm2--follow up a1c; will monitor for hyeprlipidema  6. back pain--otc meds; wants motrin instead of tyleonol and will change 11/17
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
## 2022-11-29 PROCEDURE — 90853 GROUP PSYCHOTHERAPY: CPT

## 2022-11-29 PROCEDURE — 99232 SBSQ HOSP IP/OBS MODERATE 35: CPT

## 2022-11-29 RX ADMIN — DIVALPROEX SODIUM 1500 MILLIGRAM(S): 500 TABLET, DELAYED RELEASE ORAL at 21:22

## 2022-11-29 RX ADMIN — Medication 650 MILLIGRAM(S): at 21:18

## 2022-11-29 RX ADMIN — Medication 20 MILLIGRAM(S): at 08:18

## 2022-11-29 RX ADMIN — SENNA PLUS 2 TABLET(S): 8.6 TABLET ORAL at 21:23

## 2022-11-29 RX ADMIN — LITHIUM CARBONATE 300 MILLIGRAM(S): 300 TABLET, EXTENDED RELEASE ORAL at 21:22

## 2022-11-29 RX ADMIN — LITHIUM CARBONATE 300 MILLIGRAM(S): 300 TABLET, EXTENDED RELEASE ORAL at 08:18

## 2022-11-29 RX ADMIN — POLYETHYLENE GLYCOL 3350 17 GRAM(S): 17 POWDER, FOR SOLUTION ORAL at 08:18

## 2022-11-29 RX ADMIN — Medication 650 MILLIGRAM(S): at 22:17

## 2022-11-29 RX ADMIN — PALIPERIDONE 12 MILLIGRAM(S): 1.5 TABLET, EXTENDED RELEASE ORAL at 08:18

## 2022-11-29 NOTE — BH INPATIENT PSYCHIATRY PROGRESS NOTE - NSBHASSESSSUMMFT_PSY_ALL_CORE
Patient is a 62 year old male, domiciled alone, unemployed, , noncaregiver, PPHx with working diagnoses of: Bipolar 1 disorder, MRE manic and depressive disorder NOS, 1 prior psychiatric hospitalization at [facility] (10/23-11/26/2008), denies prior suicide attempts, not in outpt tx, denies hx of violence, denies hx of legal issues/prior arrests, denies substance use/prior withdrawal/DTs, PMH of T2DM, chronic back pain, +family history, +hx treatment response to depakote and lithium brought in by self presenting with multiple medical complaints found to be manic, irritable and disorganized.  He was admitted on a 939 legal status.  He has limited sleep, profuse and pressured verbal production, tangentiality/flight of ideas and an irritable labile dramatic affect.  He is unable to care for self.  He slept (11/2) after we increased depakote er to 1500mg at bedtime.  He was still manic and unable to care for self but with some mild improvement and a steady state depakote level of 65.  We increased depakote to 2000mg at bedtime and risperidone to 3mg at bedtime with some mild improvement.  He was still symptomatic on this regimen and with a depakote level of 81.  We transitioned from risperidone 3 to invega 6 at bedtime to minimize risk of orthostasis while preserving bedtime dosing and titrated paliperidone to 12mg at bedtime He was showing some improved impulse control and relatedness but sleep was poor, he was labile and disorganized. He continued with little progress so we started lithium 300 twice daily and will reduced depkaote to 1500 at bedtime per his request. He had been started on lasix for pedal edema but we discontinued because of concerns about complicating med regimen while making adjustments to mood stabilizers, etc.     1. Continue inpatient hospitalization for safety and stabilization  2. haldol, ativan and benadryl prns  3.lithium 300 twice daily fi4rst dose pm of 11/128; paliperidone 12mg daily; depakote er 1500 at bedtime level (level on 2000 at bedtime was 81)  4. routine checks appropriate (can make needs known)  5. hx dm2--follow up a1c; will monitor for hyeprlipidema  6. back pain--otc meds; wants motrin instead of tyleonol and will change 11/17  7. pedal edema dcd lasix, will continue to observe

## 2022-11-29 NOTE — BH INPATIENT PSYCHIATRY PROGRESS NOTE - NSBHFUPINTERVALHXFT_PSY_A_CORE
Staff report no interval events, compliant with medications.  He reports he feels "tired" today.  He relates that he slept more than usual (for him) though sleep log shows he slept from [id number] and 0399-4599.  He is noted to be somewhat more on topic in group and on interview today he is still tangential but seems somewhat more subdued.  That is to say, his energy appears to be dissipating and his artem perhaps starting to subside. To simplify picture, I am discontinuing his lasix which was started t11/23 for reported edema and will monitor.  His vss--he has been consistnently mildly tachycardic but his bps though still>90/60 but today was 103/63 and  seem a bit lower lately since starting lasix. The hope would be to see more response to lithium then hopefully taper other medications accordingly.

## 2022-11-29 NOTE — BH INPATIENT PSYCHIATRY PROGRESS NOTE - CURRENT MEDICATION
MEDICATIONS  (STANDING):  divalproex ER 1500 milliGRAM(s) Oral at bedtime  lithium 300 milliGRAM(s) Oral two times a day  paliperidone ER 12 milliGRAM(s) Oral daily  polyethylene glycol 3350 17 Gram(s) Oral daily  senna 2 Tablet(s) Oral at bedtime    MEDICATIONS  (PRN):  acetaminophen    Suspension .. 650 milliGRAM(s) Oral every 6 hours PRN Temp greater or equal to 38C (100.4F), Mild Pain (1 - 3), Moderate Pain (4 - 6)  diphenhydrAMINE 50 milliGRAM(s) Oral every 6 hours PRN agitation  diphenhydrAMINE Injectable 50 milliGRAM(s) IntraMuscular once PRN agitation  haloperidol     Tablet 5 milliGRAM(s) Oral every 6 hours PRN agitation  haloperidol    Injectable 5 milliGRAM(s) IntraMuscular once PRN Agitation  ibuprofen  Tablet. 600 milliGRAM(s) Oral every 6 hours PRN Mild Pain (1 - 3), Moderate Pain (4 - 6)  LORazepam     Tablet 2 milliGRAM(s) Oral every 6 hours PRN Agitation  LORazepam   Injectable 2 milliGRAM(s) IntraMuscular Once PRN Agitation  melatonin. 5 milliGRAM(s) Oral at bedtime PRN Insomnia

## 2022-11-29 NOTE — BH INPATIENT PSYCHIATRY PROGRESS NOTE - OTHER
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

## 2022-11-30 PROCEDURE — 90853 GROUP PSYCHOTHERAPY: CPT

## 2022-11-30 PROCEDURE — 99232 SBSQ HOSP IP/OBS MODERATE 35: CPT

## 2022-11-30 RX ORDER — PALIPERIDONE 1.5 MG/1
9 TABLET, EXTENDED RELEASE ORAL AT BEDTIME
Refills: 0 | Status: DISCONTINUED | OUTPATIENT
Start: 2022-11-30 | End: 2022-12-01

## 2022-11-30 RX ADMIN — PALIPERIDONE 9 MILLIGRAM(S): 1.5 TABLET, EXTENDED RELEASE ORAL at 21:47

## 2022-11-30 RX ADMIN — Medication 650 MILLIGRAM(S): at 22:43

## 2022-11-30 RX ADMIN — DIVALPROEX SODIUM 1500 MILLIGRAM(S): 500 TABLET, DELAYED RELEASE ORAL at 21:44

## 2022-11-30 RX ADMIN — Medication 650 MILLIGRAM(S): at 07:26

## 2022-11-30 RX ADMIN — Medication 650 MILLIGRAM(S): at 08:15

## 2022-11-30 RX ADMIN — PALIPERIDONE 12 MILLIGRAM(S): 1.5 TABLET, EXTENDED RELEASE ORAL at 08:06

## 2022-11-30 RX ADMIN — SENNA PLUS 2 TABLET(S): 8.6 TABLET ORAL at 21:44

## 2022-11-30 RX ADMIN — LITHIUM CARBONATE 300 MILLIGRAM(S): 300 TABLET, EXTENDED RELEASE ORAL at 21:44

## 2022-11-30 RX ADMIN — Medication 650 MILLIGRAM(S): at 21:43

## 2022-11-30 RX ADMIN — POLYETHYLENE GLYCOL 3350 17 GRAM(S): 17 POWDER, FOR SOLUTION ORAL at 08:06

## 2022-11-30 RX ADMIN — LITHIUM CARBONATE 300 MILLIGRAM(S): 300 TABLET, EXTENDED RELEASE ORAL at 08:06

## 2022-11-30 NOTE — BH INPATIENT PSYCHIATRY PROGRESS NOTE - OTHER
reduced armswing, mask like facies, festinating gait, cogwheeling generally more subdued but still with elements of elevation, irritability, anxiety limited by thought disorder festinating "eh" increased rate no frankly delusional content klanging and other pathology as above however but some mild improvement

## 2022-11-30 NOTE — UM REPORT PROGRESS NOTE - NSUMSWACTION_GEN_A_CORE FT
PT continues to meet with patient daily however patient remains too symptomatic for discharge planning.  Pt is being discharged Thursday 12/15

## 2022-11-30 NOTE — BH INPATIENT PSYCHIATRY PROGRESS NOTE - NSBHFUPINTERVALHXFT_PSY_A_CORE
staff report no inteval events, somewhat improved sleep but still very limited.  Though still clearly symptomatic he is thought to be more calm and more organized.  However he has mask like facies, festinating gait and some cogwheeling which is likely due to invega as evidence of eps.  Lithium seems to be having some effect as he does seem to be improving as above and slowing down somewhat and less pressured (although these qualities still are evident).  This is still very early in lithium dosing and premature to get level but also anticipate lithium concentration to increase further until steady state.  Therefore we will start to taper paliperidone at least somewhat as it was not very effective and also because of the eps.  Discussed and will reduce to 9mg today.

## 2022-11-30 NOTE — BH INPATIENT PSYCHIATRY PROGRESS NOTE - CURRENT MEDICATION
MEDICATIONS  (STANDING):  divalproex ER 1500 milliGRAM(s) Oral at bedtime  lithium 300 milliGRAM(s) Oral two times a day  paliperidone ER 9 milliGRAM(s) Oral at bedtime  polyethylene glycol 3350 17 Gram(s) Oral daily  senna 2 Tablet(s) Oral at bedtime    MEDICATIONS  (PRN):  acetaminophen    Suspension .. 650 milliGRAM(s) Oral every 6 hours PRN Temp greater or equal to 38C (100.4F), Mild Pain (1 - 3), Moderate Pain (4 - 6)  diphenhydrAMINE 50 milliGRAM(s) Oral every 6 hours PRN agitation  diphenhydrAMINE Injectable 50 milliGRAM(s) IntraMuscular once PRN agitation  haloperidol     Tablet 5 milliGRAM(s) Oral every 6 hours PRN agitation  haloperidol    Injectable 5 milliGRAM(s) IntraMuscular once PRN Agitation  ibuprofen  Tablet. 600 milliGRAM(s) Oral every 6 hours PRN Mild Pain (1 - 3), Moderate Pain (4 - 6)  LORazepam     Tablet 2 milliGRAM(s) Oral every 6 hours PRN Agitation  LORazepam   Injectable 2 milliGRAM(s) IntraMuscular Once PRN Agitation  melatonin. 5 milliGRAM(s) Oral at bedtime PRN Insomnia

## 2022-11-30 NOTE — UM REPORT PROGRESS NOTE - NSUMSWNOTE_GEN_A_CORE FT
Pt continues to seem a bit calmer over time, still with disrupted sleep and disorganized thought process when speaking and tangential, but some improvements. Lithium added during week, patient compliant and seems to be sleeping slightly more. Pt is compliant with medications. We are increasing lithium and tapering off zyprexa. Patient is slowly getting better sleep, but still limited to a few hours a night. Appears more tired which is appropriate, as patient was not sleeping and not appearing tired previously. He can be irritable and fixated on "making changes" to the unit. Is better able to catch himself when he is tangential. Pt is being discharged Thursday 12/15

## 2022-11-30 NOTE — BH INPATIENT PSYCHIATRY PROGRESS NOTE - NSBHASSESSSUMMFT_PSY_ALL_CORE
Patient is a 62 year old male, domiciled alone, unemployed, , noncaregiver, PPHx with working diagnoses of: Bipolar 1 disorder, MRE manic and depressive disorder NOS, 1 prior psychiatric hospitalization at [facility] (10/23-11/26/2008), denies prior suicide attempts, not in outpt tx, denies hx of violence, denies hx of legal issues/prior arrests, denies substance use/prior withdrawal/DTs, PMH of T2DM, chronic back pain, +family history, +hx treatment response to depakote and lithium brought in by self presenting with multiple medical complaints found to be manic, irritable and disorganized.  He was admitted on a 939 legal status.  He has limited sleep, profuse and pressured verbal production, tangentiality/flight of ideas and an irritable labile dramatic affect.  He is unable to care for self.  He slept (11/2) after we increased depakote er to 1500mg at bedtime.  He was still manic and unable to care for self but with some mild improvement and a steady state depakote level of 65.  We increased depakote to 2000mg at bedtime and risperidone to 3mg at bedtime with some mild improvement.  He was still symptomatic on this regimen and with a depakote level of 81.  We transitioned from risperidone 3 to invega 6 at bedtime to minimize risk of orthostasis while preserving bedtime dosing and titrated paliperidone to 12mg at bedtime He was showing some improved impulse control and relatedness but sleep was poor, he was labile and disorganized. He continued with little progress so we started lithium 300 twice daily and will reduced depkaote to 1500 at bedtime per his request. He had been started on lasix for pedal edema but we discontinued because of concerns about complicating med regimen while making adjustments to mood stabilizers, etc.   Lithium seems to be making some observable improvement but eps from paliperidone more evident.    1. Continue inpatient hospitalization for safety and stabilization  2. haldol, ativan and benadryl prns  3. reduce paliperidone from 12 hs to 9 hs (first reduced dose 11/30);  lithium 300 twice daily first dose pm of 11/28; depakote er 1500 at bedtime level (level on 2000 at bedtime was 81)  4. routine checks appropriate (can make needs known)  5. hx dm2--follow up a1c; will monitor for hyeprlipidema  6. back pain--otc meds; wants motrin instead of tyleonol and will change 11/17  7. pedal edema dcd lasix, will continue to observe

## 2022-12-01 PROCEDURE — 90853 GROUP PSYCHOTHERAPY: CPT

## 2022-12-01 PROCEDURE — 99232 SBSQ HOSP IP/OBS MODERATE 35: CPT

## 2022-12-01 RX ORDER — PALIPERIDONE 1.5 MG/1
6 TABLET, EXTENDED RELEASE ORAL AT BEDTIME
Refills: 0 | Status: DISCONTINUED | OUTPATIENT
Start: 2022-12-01 | End: 2022-12-02

## 2022-12-01 RX ADMIN — Medication 650 MILLIGRAM(S): at 21:14

## 2022-12-01 RX ADMIN — LITHIUM CARBONATE 300 MILLIGRAM(S): 300 TABLET, EXTENDED RELEASE ORAL at 21:13

## 2022-12-01 RX ADMIN — LITHIUM CARBONATE 300 MILLIGRAM(S): 300 TABLET, EXTENDED RELEASE ORAL at 08:01

## 2022-12-01 RX ADMIN — PALIPERIDONE 6 MILLIGRAM(S): 1.5 TABLET, EXTENDED RELEASE ORAL at 21:10

## 2022-12-01 RX ADMIN — POLYETHYLENE GLYCOL 3350 17 GRAM(S): 17 POWDER, FOR SOLUTION ORAL at 08:02

## 2022-12-01 RX ADMIN — DIVALPROEX SODIUM 1500 MILLIGRAM(S): 500 TABLET, DELAYED RELEASE ORAL at 21:10

## 2022-12-01 RX ADMIN — SENNA PLUS 2 TABLET(S): 8.6 TABLET ORAL at 21:10

## 2022-12-01 NOTE — BH INPATIENT PSYCHIATRY PROGRESS NOTE - NSBHASSESSSUMMFT_PSY_ALL_CORE
Patient is a 62 year old male, domiciled alone, unemployed, , noncaregiver, PPHx with working diagnoses of: Bipolar 1 disorder, MRE manic and depressive disorder NOS, 1 prior psychiatric hospitalization at [facility] (10/23-11/26/2008), denies prior suicide attempts, not in outpt tx, denies hx of violence, denies hx of legal issues/prior arrests, denies substance use/prior withdrawal/DTs, PMH of T2DM, chronic back pain, +family history, +hx treatment response to depakote and lithium brought in by self presenting with multiple medical complaints found to be manic, irritable and disorganized.  He was admitted on a 939 legal status.  He has limited sleep, profuse and pressured verbal production, tangentiality/flight of ideas and an irritable labile dramatic affect.  He is unable to care for self.  He slept (11/2) after we increased depakote er to 1500mg at bedtime.  He was still manic and unable to care for self but with some mild improvement and a steady state depakote level of 65.  We increased depakote to 2000mg at bedtime and risperidone to 3mg at bedtime with some mild improvement.  He was still symptomatic on this regimen and with a depakote level of 81.  We transitioned from risperidone 3 to invega 6 at bedtime to minimize risk of orthostasis while preserving bedtime dosing and titrated paliperidone to 12mg at bedtime He was showing some improved impulse control and relatedness but sleep was poor, he was labile and disorganized. He continued with little progress so we started lithium 300 twice daily and will reduced depkaote to 1500 at bedtime per his request. He had been started on lasix for pedal edema but we discontinued because of concerns about complicating med regimen while making adjustments to mood stabilizers, etc.   Lithium seems to be making some observable improvement and notably in his sleep.  We started to taper pailperidone and will reduce further to 6mg at bedtime tonight.  1. Continue inpatient hospitalization for safety and stabilization  2. haldol, ativan and benadryl prns  3. reduce paliperidone from 9mg to 6mg at bedtime;  lithium 300 twice daily first dose pm of 11/28; depakote er 1500 at bedtime level (level on 2000 at bedtime was 81)  4. routine checks appropriate (can make needs known)  5. hx dm2--follow up a1c; will monitor for hyeprlipidema  6. back pain--otc meds; wants motrin instead of tyleonol and will change 11/17  7. pedal edema dcd lasix, will continue to observe

## 2022-12-01 NOTE — BH INPATIENT PSYCHIATRY PROGRESS NOTE - OTHER
generally more subdued but still with elements of elevation, irritability, anxiety "okay" more expressive, less festinating, limited by thought disorder increased rate klanging and other pathology as above however but conitnuing to improve no frankly delusional content

## 2022-12-01 NOTE — BH INPATIENT PSYCHIATRY PROGRESS NOTE - NSBHFUPINTERVALHXFT_PSY_A_CORE
staff report no interval events.  Slept 4188-2300 which is continued improvement. Does not get along with roommate who is beign discharged today and we are both hopeful he will have better luck with roommates going forward.  Initially refused lithium this am but accepted.  Dicsussed and will reduce paliperidone from 9mg to 6mg for reasons as in yesterdays note.  Gave me phone number for wife and instructed me to call her with various worries.  She was pleasant and gave me information that helped me put him more at ease.  Symptoms still present but improving.

## 2022-12-02 RX ORDER — PALIPERIDONE 1.5 MG/1
3 TABLET, EXTENDED RELEASE ORAL AT BEDTIME
Refills: 0 | Status: DISCONTINUED | OUTPATIENT
Start: 2022-12-02 | End: 2022-12-05

## 2022-12-02 RX ADMIN — Medication 650 MILLIGRAM(S): at 22:35

## 2022-12-02 RX ADMIN — LITHIUM CARBONATE 300 MILLIGRAM(S): 300 TABLET, EXTENDED RELEASE ORAL at 20:58

## 2022-12-02 RX ADMIN — LITHIUM CARBONATE 300 MILLIGRAM(S): 300 TABLET, EXTENDED RELEASE ORAL at 08:28

## 2022-12-02 RX ADMIN — PALIPERIDONE 3 MILLIGRAM(S): 1.5 TABLET, EXTENDED RELEASE ORAL at 20:58

## 2022-12-02 RX ADMIN — DIVALPROEX SODIUM 1500 MILLIGRAM(S): 500 TABLET, DELAYED RELEASE ORAL at 20:58

## 2022-12-02 RX ADMIN — POLYETHYLENE GLYCOL 3350 17 GRAM(S): 17 POWDER, FOR SOLUTION ORAL at 08:28

## 2022-12-02 RX ADMIN — Medication 650 MILLIGRAM(S): at 20:57

## 2022-12-02 NOTE — BH INPATIENT PSYCHIATRY PROGRESS NOTE - CURRENT MEDICATION
MEDICATIONS  (STANDING):  divalproex ER 1500 milliGRAM(s) Oral at bedtime  lithium 300 milliGRAM(s) Oral two times a day  polyethylene glycol 3350 17 Gram(s) Oral daily    MEDICATIONS  (PRN):  acetaminophen    Suspension .. 650 milliGRAM(s) Oral every 6 hours PRN Temp greater or equal to 38C (100.4F), Mild Pain (1 - 3), Moderate Pain (4 - 6)  diphenhydrAMINE 50 milliGRAM(s) Oral every 6 hours PRN agitation  diphenhydrAMINE Injectable 50 milliGRAM(s) IntraMuscular once PRN agitation  haloperidol     Tablet 5 milliGRAM(s) Oral every 6 hours PRN agitation  haloperidol    Injectable 5 milliGRAM(s) IntraMuscular once PRN Agitation  ibuprofen  Tablet. 600 milliGRAM(s) Oral every 6 hours PRN Mild Pain (1 - 3), Moderate Pain (4 - 6)  LORazepam     Tablet 2 milliGRAM(s) Oral every 6 hours PRN Agitation  LORazepam   Injectable 2 milliGRAM(s) IntraMuscular Once PRN Agitation  melatonin. 5 milliGRAM(s) Oral at bedtime PRN Insomnia

## 2022-12-02 NOTE — BH INPATIENT PSYCHIATRY PROGRESS NOTE - NSBHASSESSSUMMFT_PSY_ALL_CORE
Patient is a 62 year old male, domiciled alone, unemployed, , noncaregiver, PPHx with working diagnoses of: Bipolar 1 disorder, MRE manic and depressive disorder NOS, 1 prior psychiatric hospitalization at [facility] (10/23-11/26/2008), denies prior suicide attempts, not in outpt tx, denies hx of violence, denies hx of legal issues/prior arrests, denies substance use/prior withdrawal/DTs, PMH of T2DM, chronic back pain, +family history, +hx treatment response to depakote and lithium brought in by self presenting with multiple medical complaints found to be manic, irritable and disorganized.  He was admitted on a 939 legal status.  He has limited sleep, profuse and pressured verbal production, tangentiality/flight of ideas and an irritable labile dramatic affect.  He is unable to care for self.  He slept (11/2) after we increased depakote er to 1500mg at bedtime.  He was still manic and unable to care for self but with some mild improvement and a steady state depakote level of 65.  We increased depakote to 2000mg at bedtime and risperidone to 3mg at bedtime with some mild improvement.  He was still symptomatic on this regimen and with a depakote level of 81.  We transitioned from risperidone 3 to invega 6 at bedtime to minimize risk of orthostasis while preserving bedtime dosing and titrated paliperidone to 12mg at bedtime He was showing some improved impulse control and relatedness but sleep was poor, he was labile and disorganized. He continued with little progress so we started lithium 300 twice daily and will reduced depkaote to 1500 at bedtime per his request. He had been started on lasix for pedal edema but we discontinued because of concerns about complicating med regimen while making adjustments to mood stabilizers, etc.   Lithium seems to be making some observable improvement and notably in his sleep.  We started to taper pailperidone and will reduce further to 3mg at bedtime tonight.    1. Continue inpatient hospitalization for safety and stabilization  2. haldol, ativan and benadryl prns  3. reduce paliperidone from 9mg to 6mg to 3mg at bedtime;  lithium 300 twice daily first dose pm of 11/28; depakote er 1500 at bedtime level (level on 2000 at bedtime was 81)  4. routine checks appropriate (can make needs known)  5. hx dm2--follow up a1c; will monitor for hyperlipidema  6. back pain--otc meds; wants motrin instead of tyleonol and will change 11/17  7. pedal edema dcd lasix, will continue to observe

## 2022-12-02 NOTE — BH INPATIENT PSYCHIATRY PROGRESS NOTE - NSBHFUPINTERVALHXFT_PSY_A_CORE
staff report no interval events. Slept 2111-4909, compliant no psychotropic prns.  Asks to disconinue becky will do, will continue reduce paliperidone to 3mg.  He continues to improve but is still visibly symptomatic.

## 2022-12-02 NOTE — BH INPATIENT PSYCHIATRY PROGRESS NOTE - OTHER
more expressive, less festinating, generally more subdued but still with elements of elevation, irritability, anxiety limited by thought disorder "okay" increased rate no frankly delusional content klanging less prominent, less flighty

## 2022-12-03 RX ADMIN — Medication 650 MILLIGRAM(S): at 20:13

## 2022-12-03 RX ADMIN — LITHIUM CARBONATE 300 MILLIGRAM(S): 300 TABLET, EXTENDED RELEASE ORAL at 20:09

## 2022-12-03 RX ADMIN — DIVALPROEX SODIUM 1500 MILLIGRAM(S): 500 TABLET, DELAYED RELEASE ORAL at 20:09

## 2022-12-03 RX ADMIN — POLYETHYLENE GLYCOL 3350 17 GRAM(S): 17 POWDER, FOR SOLUTION ORAL at 08:05

## 2022-12-03 RX ADMIN — Medication 650 MILLIGRAM(S): at 22:19

## 2022-12-03 RX ADMIN — LITHIUM CARBONATE 300 MILLIGRAM(S): 300 TABLET, EXTENDED RELEASE ORAL at 08:03

## 2022-12-03 RX ADMIN — PALIPERIDONE 3 MILLIGRAM(S): 1.5 TABLET, EXTENDED RELEASE ORAL at 20:09

## 2022-12-04 RX ORDER — SENNA PLUS 8.6 MG/1
2 TABLET ORAL DAILY
Refills: 0 | Status: DISCONTINUED | OUTPATIENT
Start: 2022-12-04 | End: 2022-12-16

## 2022-12-04 RX ADMIN — Medication 5 MILLIGRAM(S): at 20:16

## 2022-12-04 RX ADMIN — LITHIUM CARBONATE 300 MILLIGRAM(S): 300 TABLET, EXTENDED RELEASE ORAL at 20:16

## 2022-12-04 RX ADMIN — Medication 650 MILLIGRAM(S): at 20:16

## 2022-12-04 RX ADMIN — POLYETHYLENE GLYCOL 3350 17 GRAM(S): 17 POWDER, FOR SOLUTION ORAL at 08:36

## 2022-12-04 RX ADMIN — PALIPERIDONE 3 MILLIGRAM(S): 1.5 TABLET, EXTENDED RELEASE ORAL at 20:16

## 2022-12-04 RX ADMIN — DIVALPROEX SODIUM 1500 MILLIGRAM(S): 500 TABLET, DELAYED RELEASE ORAL at 20:15

## 2022-12-04 RX ADMIN — Medication 650 MILLIGRAM(S): at 21:38

## 2022-12-04 RX ADMIN — LITHIUM CARBONATE 300 MILLIGRAM(S): 300 TABLET, EXTENDED RELEASE ORAL at 08:35

## 2022-12-05 LAB
ALBUMIN SERPL ELPH-MCNC: 3.9 G/DL — SIGNIFICANT CHANGE UP (ref 3.3–5)
ALP SERPL-CCNC: 76 U/L — SIGNIFICANT CHANGE UP (ref 40–120)
ALT FLD-CCNC: 19 U/L — SIGNIFICANT CHANGE UP (ref 4–41)
ANION GAP SERPL CALC-SCNC: 10 MMOL/L — SIGNIFICANT CHANGE UP (ref 7–14)
AST SERPL-CCNC: 19 U/L — SIGNIFICANT CHANGE UP (ref 4–40)
BASOPHILS # BLD AUTO: 0.06 K/UL — SIGNIFICANT CHANGE UP (ref 0–0.2)
BASOPHILS NFR BLD AUTO: 0.6 % — SIGNIFICANT CHANGE UP (ref 0–2)
BILIRUB SERPL-MCNC: 0.5 MG/DL — SIGNIFICANT CHANGE UP (ref 0.2–1.2)
BUN SERPL-MCNC: 19 MG/DL — SIGNIFICANT CHANGE UP (ref 7–23)
CALCIUM SERPL-MCNC: 9.3 MG/DL — SIGNIFICANT CHANGE UP (ref 8.4–10.5)
CHLORIDE SERPL-SCNC: 101 MMOL/L — SIGNIFICANT CHANGE UP (ref 98–107)
CO2 SERPL-SCNC: 29 MMOL/L — SIGNIFICANT CHANGE UP (ref 22–31)
CREAT SERPL-MCNC: 0.97 MG/DL — SIGNIFICANT CHANGE UP (ref 0.5–1.3)
EGFR: 88 ML/MIN/1.73M2 — SIGNIFICANT CHANGE UP
EOSINOPHIL # BLD AUTO: 0.18 K/UL — SIGNIFICANT CHANGE UP (ref 0–0.5)
EOSINOPHIL NFR BLD AUTO: 1.7 % — SIGNIFICANT CHANGE UP (ref 0–6)
GLUCOSE SERPL-MCNC: 107 MG/DL — HIGH (ref 70–99)
HCT VFR BLD CALC: 42.3 % — SIGNIFICANT CHANGE UP (ref 39–50)
HGB BLD-MCNC: 13.4 G/DL — SIGNIFICANT CHANGE UP (ref 13–17)
IANC: 7.04 K/UL — SIGNIFICANT CHANGE UP (ref 1.8–7.4)
IMM GRANULOCYTES NFR BLD AUTO: 0.8 % — SIGNIFICANT CHANGE UP (ref 0–0.9)
LITHIUM SERPL-MCNC: 0.4 MMOL/L — LOW (ref 0.6–1.2)
LYMPHOCYTES # BLD AUTO: 1.99 K/UL — SIGNIFICANT CHANGE UP (ref 1–3.3)
LYMPHOCYTES # BLD AUTO: 18.7 % — SIGNIFICANT CHANGE UP (ref 13–44)
MCHC RBC-ENTMCNC: 30 PG — SIGNIFICANT CHANGE UP (ref 27–34)
MCHC RBC-ENTMCNC: 31.7 GM/DL — LOW (ref 32–36)
MCV RBC AUTO: 94.6 FL — SIGNIFICANT CHANGE UP (ref 80–100)
MONOCYTES # BLD AUTO: 1.3 K/UL — HIGH (ref 0–0.9)
MONOCYTES NFR BLD AUTO: 12.2 % — SIGNIFICANT CHANGE UP (ref 2–14)
NEUTROPHILS # BLD AUTO: 7.04 K/UL — SIGNIFICANT CHANGE UP (ref 1.8–7.4)
NEUTROPHILS NFR BLD AUTO: 66 % — SIGNIFICANT CHANGE UP (ref 43–77)
NRBC # BLD: 0 /100 WBCS — SIGNIFICANT CHANGE UP (ref 0–0)
NRBC # FLD: 0 K/UL — SIGNIFICANT CHANGE UP (ref 0–0)
PLATELET # BLD AUTO: 211 K/UL — SIGNIFICANT CHANGE UP (ref 150–400)
POTASSIUM SERPL-MCNC: 4.4 MMOL/L — SIGNIFICANT CHANGE UP (ref 3.5–5.3)
POTASSIUM SERPL-SCNC: 4.4 MMOL/L — SIGNIFICANT CHANGE UP (ref 3.5–5.3)
PROT SERPL-MCNC: 7.5 G/DL — SIGNIFICANT CHANGE UP (ref 6–8.3)
RBC # BLD: 4.47 M/UL — SIGNIFICANT CHANGE UP (ref 4.2–5.8)
RBC # FLD: 14.3 % — SIGNIFICANT CHANGE UP (ref 10.3–14.5)
SODIUM SERPL-SCNC: 140 MMOL/L — SIGNIFICANT CHANGE UP (ref 135–145)
VALPROATE SERPL-MCNC: 66.9 UG/ML — SIGNIFICANT CHANGE UP (ref 50–100)
WBC # BLD: 10.65 K/UL — HIGH (ref 3.8–10.5)
WBC # FLD AUTO: 10.65 K/UL — HIGH (ref 3.8–10.5)

## 2022-12-05 PROCEDURE — 90853 GROUP PSYCHOTHERAPY: CPT

## 2022-12-05 RX ORDER — LITHIUM CARBONATE 300 MG/1
600 TABLET, EXTENDED RELEASE ORAL
Refills: 0 | Status: DISCONTINUED | OUTPATIENT
Start: 2022-12-05 | End: 2022-12-12

## 2022-12-05 RX ORDER — TRAZODONE HCL 50 MG
50 TABLET ORAL AT BEDTIME
Refills: 0 | Status: DISCONTINUED | OUTPATIENT
Start: 2022-12-05 | End: 2022-12-16

## 2022-12-05 RX ORDER — TRAZODONE HCL 50 MG
50 TABLET ORAL ONCE
Refills: 0 | Status: COMPLETED | OUTPATIENT
Start: 2022-12-05 | End: 2022-12-05

## 2022-12-05 RX ADMIN — Medication 5 MILLIGRAM(S): at 21:14

## 2022-12-05 RX ADMIN — POLYETHYLENE GLYCOL 3350 17 GRAM(S): 17 POWDER, FOR SOLUTION ORAL at 08:16

## 2022-12-05 RX ADMIN — Medication 50 MILLIGRAM(S): at 03:17

## 2022-12-05 RX ADMIN — LITHIUM CARBONATE 600 MILLIGRAM(S): 300 TABLET, EXTENDED RELEASE ORAL at 20:36

## 2022-12-05 RX ADMIN — LITHIUM CARBONATE 300 MILLIGRAM(S): 300 TABLET, EXTENDED RELEASE ORAL at 08:16

## 2022-12-05 RX ADMIN — Medication 650 MILLIGRAM(S): at 22:14

## 2022-12-05 RX ADMIN — DIVALPROEX SODIUM 1500 MILLIGRAM(S): 500 TABLET, DELAYED RELEASE ORAL at 20:35

## 2022-12-05 RX ADMIN — Medication 650 MILLIGRAM(S): at 21:14

## 2022-12-05 NOTE — BH INPATIENT PSYCHIATRY PROGRESS NOTE - CURRENT MEDICATION
MEDICATIONS  (STANDING):  divalproex ER 1500 milliGRAM(s) Oral at bedtime  lithium 600 milliGRAM(s) Oral two times a day  polyethylene glycol 3350 17 Gram(s) Oral daily    MEDICATIONS  (PRN):  acetaminophen    Suspension .. 650 milliGRAM(s) Oral every 6 hours PRN Temp greater or equal to 38C (100.4F), Mild Pain (1 - 3), Moderate Pain (4 - 6)  diphenhydrAMINE 50 milliGRAM(s) Oral every 6 hours PRN agitation  diphenhydrAMINE Injectable 50 milliGRAM(s) IntraMuscular once PRN agitation  haloperidol     Tablet 5 milliGRAM(s) Oral every 6 hours PRN agitation  haloperidol    Injectable 5 milliGRAM(s) IntraMuscular once PRN Agitation  ibuprofen  Tablet. 600 milliGRAM(s) Oral every 6 hours PRN Mild Pain (1 - 3), Moderate Pain (4 - 6)  LORazepam     Tablet 2 milliGRAM(s) Oral every 6 hours PRN Agitation  LORazepam   Injectable 2 milliGRAM(s) IntraMuscular Once PRN Agitation  melatonin. 5 milliGRAM(s) Oral at bedtime PRN Insomnia  senna 2 Tablet(s) Oral daily PRN constipation  traZODone 50 milliGRAM(s) Oral at bedtime PRN insomnia

## 2022-12-05 NOTE — BH INPATIENT PSYCHIATRY PROGRESS NOTE - NSBHASSESSSUMMFT_PSY_ALL_CORE
Patient is a 62 year old male, domiciled alone, unemployed, , noncaregiver, PPHx with working diagnoses of: Bipolar 1 disorder, MRE manic and depressive disorder NOS, 1 prior psychiatric hospitalization at [facility] (10/23-11/26/2008), denies prior suicide attempts, not in outpt tx, denies hx of violence, denies hx of legal issues/prior arrests, denies substance use/prior withdrawal/DTs, PMH of T2DM, chronic back pain, +family history, +hx treatment response to depakote and lithium brought in by self presenting with multiple medical complaints found to be manic, irritable and disorganized.  He was admitted on a 939 legal status.  He has limited sleep, profuse and pressured verbal production, tangentiality/flight of ideas and an irritable labile dramatic affect.  He is unable to care for self.  He slept (11/2) after we increased depakote er to 1500mg at bedtime.  He was still manic and unable to care for self but with some mild improvement and a steady state depakote level of 65.  We increased depakote to 2000mg at bedtime and risperidone to 3mg at bedtime with some mild improvement.  He was still symptomatic on this regimen and with a depakote level of 81.  We transitioned from risperidone 3 to invega 6 at bedtime to minimize risk of orthostasis while preserving bedtime dosing and titrated paliperidone to 12mg at bedtime He was showing some improved impulse control and relatedness but sleep was poor, he was labile and disorganized. He continued with little progress so we started lithium 300 twice daily and will reduced depkaote to 1500 at bedtime per his request. He had been started on lasix for pedal edema but we discontinued because of concerns about complicating med regimen while making adjustments to mood stabilizers, etc.   Lithium seemed to be making some observable improvement and notably in his sleep.  We tapered paliperidone to discontinuation (last dose Sunday 12/4).  Lithium level the next day wa 0.4 and depakote level was in the 60s.  He seemed to be making progress with lithium so we increased it to 600mg twice daily.    1. Continue inpatient hospitalization for safety and stabilization  2. haldol, ativan and benadryl prns  3. increase lithium to 600mg twice daily (level 0.4 on 300 bid 12/5); discontinue paliperidone (last dose 12/4); depakote er 1500 at bedtime level (level on 2000 at bedtime was 81; level on 1500 was in the 60s (12/5))  4. routine checks appropriate (can make needs known)  5. hx dm2--follow up a1c; will monitor for hyperlipidema  6. back pain--otc meds; wants motrin instead of tyleonol and will change 11/17  7. pedal edema dcd lasix, will continue to observe

## 2022-12-05 NOTE — BH INPATIENT PSYCHIATRY PROGRESS NOTE - OTHER
trending towards euthymia with a bit of playfulness.  [place] at times. limited by thought disorder "okay" no frankly delusional content more expressive, less festinating, less blunted klanging less prominent, less flighty

## 2022-12-05 NOTE — BH INPATIENT PSYCHIATRY PROGRESS NOTE - NSBHFUPINTERVALHXFT_PSY_A_CORE
Staff report no interval events;  Last night, slept until 0200 after receiving melatonin then was awake from [id number].  Received trazodone prn and slept from 8826-9094.  Sleep Saturday into sunday  was very limited with about an hour or two total and friday into saturday he slept until 0145 then was more or less awake.  Noted to fall asleep in some groups.  Noted to be somewhat intrusive hyperverbal, disorganized.  Lithium level 0.4, depakote level in the 60s.  Labs otherwise grossly WNL.  He is less preoccupied with his family and upbringing today and relates an episode that have precipitated the admission in which he assisted in trapping a mouse at work.

## 2022-12-06 RX ADMIN — Medication 5 MILLIGRAM(S): at 20:32

## 2022-12-06 RX ADMIN — LITHIUM CARBONATE 600 MILLIGRAM(S): 300 TABLET, EXTENDED RELEASE ORAL at 20:32

## 2022-12-06 RX ADMIN — Medication 650 MILLIGRAM(S): at 21:10

## 2022-12-06 RX ADMIN — LITHIUM CARBONATE 600 MILLIGRAM(S): 300 TABLET, EXTENDED RELEASE ORAL at 08:24

## 2022-12-06 RX ADMIN — DIVALPROEX SODIUM 1500 MILLIGRAM(S): 500 TABLET, DELAYED RELEASE ORAL at 20:32

## 2022-12-06 RX ADMIN — Medication 650 MILLIGRAM(S): at 20:38

## 2022-12-06 RX ADMIN — POLYETHYLENE GLYCOL 3350 17 GRAM(S): 17 POWDER, FOR SOLUTION ORAL at 09:35

## 2022-12-06 NOTE — BH INPATIENT PSYCHIATRY PROGRESS NOTE - OTHER
a bit more irritable today less blunted more expressive, less festinating, limited by thought disorder "..." no frankly delusional content klanging less prominent, less flighty

## 2022-12-06 NOTE — BH INPATIENT PSYCHIATRY PROGRESS NOTE - NSBHFUPINTERVALHXFT_PSY_A_CORE
Staff report no interval events;  now attending groups.  Ativan and benadryl prns by 10pm, slept until 0245 then awake.  Perseverative and insistent.

## 2022-12-06 NOTE — BH INPATIENT PSYCHIATRY PROGRESS NOTE - NSBHASSESSSUMMFT_PSY_ALL_CORE
Patient is a 62 year old male, domiciled alone, unemployed, , noncaregiver, PPHx with working diagnoses of: Bipolar 1 disorder, MRE manic and depressive disorder NOS, 1 prior psychiatric hospitalization at [facility] (10/23-11/26/2008), denies prior suicide attempts, not in outpt tx, denies hx of violence, denies hx of legal issues/prior arrests, denies substance use/prior withdrawal/DTs, PMH of T2DM, chronic back pain, +family history, +hx treatment response to depakote and lithium brought in by self presenting with multiple medical complaints found to be manic, irritable and disorganized.  He was admitted on a 939 legal status.  He has limited sleep, profuse and pressured verbal production, tangentiality/flight of ideas and an irritable labile dramatic affect.  He is unable to care for self.  He slept (11/2) after we increased depakote er to 1500mg at bedtime.  He was still manic and unable to care for self but with some mild improvement and a steady state depakote level of 65.  We increased depakote to 2000mg at bedtime and risperidone to 3mg at bedtime with some mild improvement.  He was still symptomatic on this regimen and with a depakote level of 81.  We transitioned from risperidone 3 to invega 6 at bedtime to minimize risk of orthostasis while preserving bedtime dosing and titrated paliperidone to 12mg at bedtime He was showing some improved impulse control and relatedness but sleep was poor, he was labile and disorganized. He continued with little progress so we started lithium 300 twice daily and will reduced depkaote to 1500 at bedtime per his request. He had been started on lasix for pedal edema but we discontinued because of concerns about complicating med regimen while making adjustments to mood stabilizers, etc.   Lithium seemed to be making some observable improvement and notably in his sleep.  We tapered paliperidone to discontinuation (last dose Sunday 12/4).  Lithium level the next day wa 0.4 and depakote level was in the 60s.  He seemed to be making progress with lithium so we increased it to 600mg twice daily.  Today is first full day of lithium 600mg twice daily.    1. Continue inpatient hospitalization for safety and stabilization  2. haldol, ativan and benadryl prns  3. increase lithium to 600mg twice daily (level 0.4 on 300 bid 12/5); discontinue paliperidone (last dose 12/4); depakote er 1500 at bedtime level (level on 2000 at bedtime was 81; level on 1500 was in the 60s (12/5))  4. routine checks appropriate (can make needs known)  5. hx dm2--follow up a1c; will monitor for hyperlipidema  6. back pain--otc meds; wants motrin instead of tyleonol and will change 11/17  7. pedal edema dcd lasix, will continue to observe

## 2022-12-07 PROCEDURE — 99231 SBSQ HOSP IP/OBS SF/LOW 25: CPT

## 2022-12-07 RX ORDER — DIVALPROEX SODIUM 500 MG/1
1000 TABLET, DELAYED RELEASE ORAL AT BEDTIME
Refills: 0 | Status: DISCONTINUED | OUTPATIENT
Start: 2022-12-07 | End: 2022-12-09

## 2022-12-07 RX ADMIN — POLYETHYLENE GLYCOL 3350 17 GRAM(S): 17 POWDER, FOR SOLUTION ORAL at 08:05

## 2022-12-07 RX ADMIN — DIVALPROEX SODIUM 1000 MILLIGRAM(S): 500 TABLET, DELAYED RELEASE ORAL at 20:41

## 2022-12-07 RX ADMIN — Medication 650 MILLIGRAM(S): at 21:31

## 2022-12-07 RX ADMIN — LITHIUM CARBONATE 600 MILLIGRAM(S): 300 TABLET, EXTENDED RELEASE ORAL at 20:41

## 2022-12-07 RX ADMIN — Medication 50 MILLIGRAM(S): at 20:41

## 2022-12-07 RX ADMIN — LITHIUM CARBONATE 600 MILLIGRAM(S): 300 TABLET, EXTENDED RELEASE ORAL at 08:04

## 2022-12-07 RX ADMIN — Medication 650 MILLIGRAM(S): at 20:41

## 2022-12-07 RX ADMIN — Medication 5 MILLIGRAM(S): at 20:41

## 2022-12-07 NOTE — BH INPATIENT PSYCHIATRY PROGRESS NOTE - NSBHFUPINTERVALHXFT_PSY_A_CORE
Staff report no interval events;  slept until 0330, compliant with meds, melatonin prn.  He is less forceful and less insistent today, still over productive and digresses but affect neutral to euthymic, more stable and supple.  Becomes preoccupied with wife and son and how to care for them etc.  Provided re-asurance.  He is showing some improvement.  We are also working on helping respect appropriate boundaries.

## 2022-12-07 NOTE — BH INPATIENT PSYCHIATRY PROGRESS NOTE - OTHER
more expressive trending towards euthymia, less blunted. less blunted less labile still digresses improving "my sleep is better" no frankly delusional content digresses, runs on

## 2022-12-07 NOTE — BH INPATIENT PSYCHIATRY PROGRESS NOTE - NSBHASSESSSUMMFT_PSY_ALL_CORE
Patient is a 62 year old male, domiciled alone, unemployed, , noncaregiver, PPHx with working diagnoses of: Bipolar 1 disorder, MRE manic and depressive disorder NOS, 1 prior psychiatric hospitalization at [facility] (10/23-11/26/2008), denies prior suicide attempts, not in outpt tx, denies hx of violence, denies hx of legal issues/prior arrests, denies substance use/prior withdrawal/DTs, PMH of T2DM, chronic back pain, +family history, +hx treatment response to depakote and lithium brought in by self presenting with multiple medical complaints found to be manic, irritable and disorganized.  He was admitted on a 939 legal status.  He has limited sleep, profuse and pressured verbal production, tangentiality/flight of ideas and an irritable labile dramatic affect.  He is unable to care for self.  He slept (11/2) after we increased depakote er to 1500mg at bedtime.  He was still manic and unable to care for self but with some mild improvement and a steady state depakote level of 65.  We increased depakote to 2000mg at bedtime and risperidone to 3mg at bedtime with some mild improvement.  He was still symptomatic on this regimen and with a depakote level of 81.  We transitioned from risperidone 3 to invega 6 at bedtime to minimize risk of orthostasis while preserving bedtime dosing and titrated paliperidone to 12mg at bedtime He was showing some improved impulse control and relatedness but sleep was poor, he was labile and disorganized. He continued with little progress so we started lithium 300 twice daily and will reduced depkaote to 1500 at bedtime per his request. He had been started on lasix for pedal edema but we discontinued because of concerns about complicating med regimen while making adjustments to mood stabilizers, etc.   Lithium seemed to be making some observable improvement and notably in his sleep.  We tapered paliperidone to discontinuation (last dose Sunday 12/4).  Lithium level the next day wa 0.4 and depakote level was in the 60s.  He seemed to be making progress with lithium so we increased it to 600mg twice daily.  This continues and we will decrease depakote form 1500 to 1000mg at bedtime  1. Continue inpatient hospitalization for safety and stabilization  2. haldol, ativan and benadryl prns  3. increase lithium to 600mg twice daily (level 0.4 on 300 bid 12/5); discontinue paliperidone (last dose 12/4); depakote er 1000 at bedtime (level on 2000 at bedtime was 81; level on 1500 was in the 60s (12/5))  4. routine checks appropriate (can make needs known)  5. hx dm2--follow up a1c; will monitor for hyperlipidema  6. back pain--otc meds; wants motrin instead of tyleonol and will change 11/17  7. pedal edema dcd lasix, will continue to observe

## 2022-12-07 NOTE — BH INPATIENT PSYCHIATRY PROGRESS NOTE - CURRENT MEDICATION
MEDICATIONS  (STANDING):  divalproex ER 1000 milliGRAM(s) Oral at bedtime  lithium 600 milliGRAM(s) Oral two times a day  polyethylene glycol 3350 17 Gram(s) Oral daily    MEDICATIONS  (PRN):  acetaminophen    Suspension .. 650 milliGRAM(s) Oral every 6 hours PRN Temp greater or equal to 38C (100.4F), Mild Pain (1 - 3), Moderate Pain (4 - 6)  diphenhydrAMINE 50 milliGRAM(s) Oral every 6 hours PRN agitation  diphenhydrAMINE Injectable 50 milliGRAM(s) IntraMuscular once PRN agitation  haloperidol     Tablet 5 milliGRAM(s) Oral every 6 hours PRN agitation  haloperidol    Injectable 5 milliGRAM(s) IntraMuscular once PRN Agitation  ibuprofen  Tablet. 600 milliGRAM(s) Oral every 6 hours PRN Mild Pain (1 - 3), Moderate Pain (4 - 6)  LORazepam     Tablet 2 milliGRAM(s) Oral every 6 hours PRN Agitation  LORazepam   Injectable 2 milliGRAM(s) IntraMuscular Once PRN Agitation  melatonin. 5 milliGRAM(s) Oral at bedtime PRN Insomnia  senna 2 Tablet(s) Oral daily PRN constipation  traZODone 50 milliGRAM(s) Oral at bedtime PRN insomnia

## 2022-12-08 LAB
ALBUMIN SERPL ELPH-MCNC: 4.1 G/DL — SIGNIFICANT CHANGE UP (ref 3.3–5)
ALP SERPL-CCNC: 80 U/L — SIGNIFICANT CHANGE UP (ref 40–120)
ALT FLD-CCNC: 21 U/L — SIGNIFICANT CHANGE UP (ref 4–41)
ANION GAP SERPL CALC-SCNC: 14 MMOL/L — SIGNIFICANT CHANGE UP (ref 7–14)
AST SERPL-CCNC: 23 U/L — SIGNIFICANT CHANGE UP (ref 4–40)
BASOPHILS # BLD AUTO: 0.08 K/UL — SIGNIFICANT CHANGE UP (ref 0–0.2)
BASOPHILS NFR BLD AUTO: 0.7 % — SIGNIFICANT CHANGE UP (ref 0–2)
BILIRUB SERPL-MCNC: 0.4 MG/DL — SIGNIFICANT CHANGE UP (ref 0.2–1.2)
BUN SERPL-MCNC: 21 MG/DL — SIGNIFICANT CHANGE UP (ref 7–23)
CALCIUM SERPL-MCNC: 9.5 MG/DL — SIGNIFICANT CHANGE UP (ref 8.4–10.5)
CHLORIDE SERPL-SCNC: 101 MMOL/L — SIGNIFICANT CHANGE UP (ref 98–107)
CO2 SERPL-SCNC: 28 MMOL/L — SIGNIFICANT CHANGE UP (ref 22–31)
CREAT SERPL-MCNC: 1 MG/DL — SIGNIFICANT CHANGE UP (ref 0.5–1.3)
CRP SERPL HS-MCNC: 4.7 MG/L — HIGH
EGFR: 85 ML/MIN/1.73M2 — SIGNIFICANT CHANGE UP
EOSINOPHIL # BLD AUTO: 0.19 K/UL — SIGNIFICANT CHANGE UP (ref 0–0.5)
EOSINOPHIL NFR BLD AUTO: 1.5 % — SIGNIFICANT CHANGE UP (ref 0–6)
GLUCOSE SERPL-MCNC: 97 MG/DL — SIGNIFICANT CHANGE UP (ref 70–99)
HCT VFR BLD CALC: 43 % — SIGNIFICANT CHANGE UP (ref 39–50)
HGB BLD-MCNC: 13.8 G/DL — SIGNIFICANT CHANGE UP (ref 13–17)
IANC: 8.2 K/UL — HIGH (ref 1.8–7.4)
IMM GRANULOCYTES NFR BLD AUTO: 0.5 % — SIGNIFICANT CHANGE UP (ref 0–0.9)
LYMPHOCYTES # BLD AUTO: 19.1 % — SIGNIFICANT CHANGE UP (ref 13–44)
LYMPHOCYTES # BLD AUTO: 2.34 K/UL — SIGNIFICANT CHANGE UP (ref 1–3.3)
MCHC RBC-ENTMCNC: 30.6 PG — SIGNIFICANT CHANGE UP (ref 27–34)
MCHC RBC-ENTMCNC: 32.1 GM/DL — SIGNIFICANT CHANGE UP (ref 32–36)
MCV RBC AUTO: 95.3 FL — SIGNIFICANT CHANGE UP (ref 80–100)
MONOCYTES # BLD AUTO: 1.4 K/UL — HIGH (ref 0–0.9)
MONOCYTES NFR BLD AUTO: 11.4 % — SIGNIFICANT CHANGE UP (ref 2–14)
NEUTROPHILS # BLD AUTO: 8.2 K/UL — HIGH (ref 1.8–7.4)
NEUTROPHILS NFR BLD AUTO: 66.8 % — SIGNIFICANT CHANGE UP (ref 43–77)
NRBC # BLD: 0 /100 WBCS — SIGNIFICANT CHANGE UP (ref 0–0)
NRBC # FLD: 0 K/UL — SIGNIFICANT CHANGE UP (ref 0–0)
PLATELET # BLD AUTO: 216 K/UL — SIGNIFICANT CHANGE UP (ref 150–400)
POTASSIUM SERPL-MCNC: 4.3 MMOL/L — SIGNIFICANT CHANGE UP (ref 3.5–5.3)
POTASSIUM SERPL-SCNC: 4.3 MMOL/L — SIGNIFICANT CHANGE UP (ref 3.5–5.3)
PROT SERPL-MCNC: 7.7 G/DL — SIGNIFICANT CHANGE UP (ref 6–8.3)
RBC # BLD: 4.51 M/UL — SIGNIFICANT CHANGE UP (ref 4.2–5.8)
RBC # FLD: 14.2 % — SIGNIFICANT CHANGE UP (ref 10.3–14.5)
SODIUM SERPL-SCNC: 143 MMOL/L — SIGNIFICANT CHANGE UP (ref 135–145)
TROPONIN T, HIGH SENSITIVITY RESULT: 19 NG/L — SIGNIFICANT CHANGE UP
WBC # BLD: 12.27 K/UL — HIGH (ref 3.8–10.5)
WBC # FLD AUTO: 12.27 K/UL — HIGH (ref 3.8–10.5)

## 2022-12-08 PROCEDURE — 90853 GROUP PSYCHOTHERAPY: CPT

## 2022-12-08 PROCEDURE — 99231 SBSQ HOSP IP/OBS SF/LOW 25: CPT

## 2022-12-08 RX ADMIN — Medication 50 MILLIGRAM(S): at 21:12

## 2022-12-08 RX ADMIN — Medication 650 MILLIGRAM(S): at 21:11

## 2022-12-08 RX ADMIN — LITHIUM CARBONATE 600 MILLIGRAM(S): 300 TABLET, EXTENDED RELEASE ORAL at 21:12

## 2022-12-08 RX ADMIN — LITHIUM CARBONATE 600 MILLIGRAM(S): 300 TABLET, EXTENDED RELEASE ORAL at 08:07

## 2022-12-08 RX ADMIN — POLYETHYLENE GLYCOL 3350 17 GRAM(S): 17 POWDER, FOR SOLUTION ORAL at 08:07

## 2022-12-08 RX ADMIN — Medication 650 MILLIGRAM(S): at 22:11

## 2022-12-08 RX ADMIN — DIVALPROEX SODIUM 1000 MILLIGRAM(S): 500 TABLET, DELAYED RELEASE ORAL at 21:12

## 2022-12-08 NOTE — BH INPATIENT PSYCHIATRY PROGRESS NOTE - NSBHASSESSSUMMFT_PSY_ALL_CORE
Patient is a 62 year old male, domiciled alone, unemployed, , noncaregiver, PPHx with working diagnoses of: Bipolar 1 disorder, MRE manic and depressive disorder NOS, 1 prior psychiatric hospitalization at [facility] (10/23-11/26/2008), denies prior suicide attempts, not in outpt tx, denies hx of violence, denies hx of legal issues/prior arrests, denies substance use/prior withdrawal/DTs, PMH of T2DM, chronic back pain, +family history, +hx treatment response to depakote and lithium brought in by self presenting with multiple medical complaints found to be manic, irritable and disorganized.  He was admitted on a 939 legal status.  He has limited sleep, profuse and pressured verbal production, tangentiality/flight of ideas and an irritable labile dramatic affect.  He is unable to care for self.  He slept (11/2) after we increased depakote er to 1500mg at bedtime.  He was still manic and unable to care for self but with some mild improvement and a steady state depakote level of 65.  We increased depakote to 2000mg at bedtime and risperidone to 3mg at bedtime with some mild improvement.  He was still symptomatic on this regimen and with a depakote level of 81.  We transitioned from risperidone 3 to invega 6 at bedtime to minimize risk of orthostasis while preserving bedtime dosing and titrated paliperidone to 12mg at bedtime He was showing some improved impulse control and relatedness but sleep was poor, he was labile and disorganized. He continued with little progress so we started lithium 300 twice daily and will reduced depkaote to 1500 at bedtime per his request. He had been started on lasix for pedal edema but we discontinued because of concerns about complicating med regimen while making adjustments to mood stabilizers, etc.   Lithium seemed to be making some observable improvement and notably in his sleep.  We tapered paliperidone to discontinuation (last dose Sunday 12/4).  Lithium level the next day wa 0.4 and depakote level was in the 60s.  He seemed to be making progress with lithium so we increased it to 600mg twice daily.  This continued and we decreased depakote to 1000mg at bedtime.  We will obtain a lithium level after five days.    1. Continue inpatient hospitalization for safety and stabilization  2. haldol, ativan and benadryl prns  3. increase lithium to 600mg twice daily (level 0.4 on 300 bid 12/5); discontinue paliperidone (last dose 12/4); depakote er 1000 at bedtime (level on 2000 at bedtime was 81; level on 1500 was in the 60s (12/5))  4. routine checks appropriate (can make needs known)  5. hx dm2--follow up a1c; will monitor for hyperlipidema  6. back pain--otc meds; wants motrin instead of tyleonol and will change 11/17  7. pedal edema dcd lasix, will continue to observe

## 2022-12-08 NOTE — BH INPATIENT PSYCHIATRY PROGRESS NOTE - CURRENT MEDICATION
MEDICATIONS  (STANDING):  divalproex ER 1000 milliGRAM(s) Oral at bedtime  lithium 600 milliGRAM(s) Oral two times a day  polyethylene glycol 3350 17 Gram(s) Oral daily    MEDICATIONS  (PRN):  acetaminophen    Suspension .. 650 milliGRAM(s) Oral every 6 hours PRN Temp greater or equal to 38C (100.4F), Mild Pain (1 - 3), Moderate Pain (4 - 6)  diphenhydrAMINE 50 milliGRAM(s) Oral every 6 hours PRN agitation  diphenhydrAMINE Injectable 50 milliGRAM(s) IntraMuscular once PRN agitation  haloperidol     Tablet 5 milliGRAM(s) Oral every 6 hours PRN agitation  haloperidol    Injectable 5 milliGRAM(s) IntraMuscular once PRN Agitation  ibuprofen  Tablet. 600 milliGRAM(s) Oral every 6 hours PRN Mild Pain (1 - 3), Moderate Pain (4 - 6)  LORazepam     Tablet 2 milliGRAM(s) Oral every 6 hours PRN Agitation  LORazepam   Injectable 2 milliGRAM(s) IntraMuscular Once PRN Agitation  melatonin. 5 milliGRAM(s) Oral at bedtime PRN Insomnia  senna 2 Tablet(s) Oral daily PRN constipation  traZODone 50 milliGRAM(s) Oral at bedtime PRN insomnia   Operative Note    Patient: Lizz Eden 51 year old female    MRN: [id number]    Surgeon(s): Marcus Ochoa MD  Phone Number: [phone]                       Surgeon(s) and Role:     * Marcus Ochoa MD - Primary    Assistant(s):     Pre-Op Diagnosis: STRESS INCONTINENCE     Post-Op Diagnosis:   Stress incontinence     Procedure: Procedure(s):  TRANSOBTURATOR TAPE SLING PLACEMENT    Anesthesia Type: General                                   Complications: None    Description:     The patient was brought to the operating table and was placed in dorsal lithotomy position.  The patient's vaginal and perineal areas were prepped and draped in usual sterile fashion.  A 16-Gu[place]ian Osorio catheter was placed and a [place] retractor was placed for external exposure. Approximately 5 mL of 0.5% Marcaine with 1:200,000 epinephrine was injected into the patient's subepithelial space, followed by creation of 1.5 cm vertical midline incision at the level of the mid urethra.  Combination of blunt and sharp dissection was performed until the patient's inferior pubic rami were palpable bilaterally.  At this point, two small perforations were made at the medial aspect of the bilateral obturator foramen.  Spiral hooks from suburethral tape placement kit was used to enter into the retropubic space through the obturator membrane after the bladder’s urine was drained through the Osorio catheter.  The tips of the spiral hooks were advanced through the retropubic space and brought out through the endopelvic fascia on either side of the urethra medial to the inferior pubic rami.  Polypropylene mesh tape was then clipped onto the tips of the spiral hooks which were then transferred back out, thereby completing the midurethral positioning of the suburethral tape.  After proper tensioning was achieved, excess length of the tape was cut.  2-0 Vicryl suture was used to close the patient's vaginal incision.  Vaginal packing was placed, and the patient's  bladder was filled with approximately 150 mL of saline and the Osorio catheter was taken out.  The patient was taken down from the operating table and was transferred to the recovery room in stable condition.    Findings:     Specimens Removed: No specimens collected     Estimated Blood Loss: None    Assistant Tasks: Opening closure   Implants:   Implant Name Type Inv. Item Serial No.  Lot No. LRB No. Used Action   SYS DESARA SLING - [id number] Other Implant SYS DESARA SLING  [facility] [id number] N/A 1 Implanted       I was present for the key portions of the procedure and was immediately available for the non-key portions

## 2022-12-08 NOTE — BH INPATIENT PSYCHIATRY PROGRESS NOTE - NSBHFUPINTERVALHXFT_PSY_A_CORE
Staff report no interval events;  slept until 0400 which is good for him.  Staff report he is less intrusive and is pleasant.  He has a more stable affect.  He is still over productive and somewhat flighty but we feel this is of lesser extent.

## 2022-12-09 PROCEDURE — 99231 SBSQ HOSP IP/OBS SF/LOW 25: CPT

## 2022-12-09 PROCEDURE — 90853 GROUP PSYCHOTHERAPY: CPT

## 2022-12-09 RX ORDER — DIVALPROEX SODIUM 500 MG/1
500 TABLET, DELAYED RELEASE ORAL AT BEDTIME
Refills: 0 | Status: DISCONTINUED | OUTPATIENT
Start: 2022-12-09 | End: 2022-12-12

## 2022-12-09 RX ADMIN — LITHIUM CARBONATE 600 MILLIGRAM(S): 300 TABLET, EXTENDED RELEASE ORAL at 20:49

## 2022-12-09 RX ADMIN — Medication 650 MILLIGRAM(S): at 22:40

## 2022-12-09 RX ADMIN — DIVALPROEX SODIUM 500 MILLIGRAM(S): 500 TABLET, DELAYED RELEASE ORAL at 20:50

## 2022-12-09 RX ADMIN — POLYETHYLENE GLYCOL 3350 17 GRAM(S): 17 POWDER, FOR SOLUTION ORAL at 09:10

## 2022-12-09 RX ADMIN — LITHIUM CARBONATE 600 MILLIGRAM(S): 300 TABLET, EXTENDED RELEASE ORAL at 09:15

## 2022-12-09 RX ADMIN — Medication 650 MILLIGRAM(S): at 21:15

## 2022-12-09 NOTE — BH TREATMENT PLAN - NSBHPRIMARYDX_PSY_ALL_CORE
Severe manic bipolar 1 disorder with psychotic behavior    

## 2022-12-09 NOTE — BH TREATMENT PLAN - NSTXMANICINTERPR_PSY_ALL_CORE
Over the next 7 days, Psychiatric Rehabilitation staff will utilize group and individual psychotherapy, and psychoeducation to assist the patient in reaching established treatment goal.
Pt is making improvements towards specified psychiatric rehabilitation goal this week as he has started to exhibit less manic symptoms in the group context and is able to be more redirected both during 1:1 discussions and in the group context. Pt remains with elevated energy and hyperverbal/pressured speech therefore remaining with some manic symptoms. Will continue to support the pt during the current stay.
Pt is currently making improvements towards specified psychiatric rehabilitation goal this week as he has started to demonstrate a reduction in hyperactive pacing and verbal intrusiveness. Today, although the pt continues to ask several questions and speak at length, he was more redirectable and responded to writer's feedback rather than speaking over her in conversation. Pt appears sleepy and on edge at times. Pt's mood remains labile and pt continues to exhibit symptoms of artem. Will continue to support.
Pt is making slight progress towards specified psychiatric rehabilitation goal this week as he has demonstrated a slight reduction in manic symptoms. Pt remains socially intrusive with poor boundaries. Pt remains disorganized and illogial. Pt encouraged to continue to engage in groups for skill development and positive socialization.
Pt. has made some progress as evidenced by pt. being slightly less pressured in speech but remains hyper verbal, intrusive and disorganized. Over the next seven days, Psychiatric rehabilitation staff will continue to provide encouragement, support, psychotherapy, and psychoeducation to assist the patient in the progression of established treatment goal

## 2022-12-09 NOTE — BH TREATMENT PLAN - NSTXDCOTHRGOAL_PSY_ALL_CORE
Pt will show a reduction of symptoms and engage meaningfully with writer to identify a safe discharge plan. Pt will comply with recommended tx plan and medications for 5 days, identify 2 benefits for adhering to tx.
Pt will show a reduction of symptoms and engage meaningfully with writer to identify a safe discharge plan. Pt will comply with recommended tx plan and medications for 5 days, identify 2 benefits for adhering to tx.
Pt will show a reduction of symptoms and engage meaningfully with writer to identify a safe discharge plan. Pt will comply with recommended tx plan and medications for 5 days and identify 2 benefits for adhering to tx.
Pt will show a reduction of symptoms and engage meaningfully with writer to identify a safe discharge plan. Pt will comply with recommended tx plan and medications for 5 days and identify 2 benefits for adhering to tx.
Pt will show a reduction of symptoms and engage meaningfully with writer to identify a safe discharge plan. Pt will comply with recommended tx plan and medications for 5 days, identify 2 benefits for adhering to tx.

## 2022-12-09 NOTE — BH TREATMENT PLAN - NSTXDCOTHRINTERSW_PSY_ALL_CORE
Unit SW provided insight, support, and psycho-education while maintaining contact with identified supports.
Unit SW provided insight, support, and psycho-education while maintaining contact with identified supports.
SW will provide support, insight, discharge planning, psycho-education, and maintain contact with identified supports.
Unit SW provided insight, support, and psycho-education while maintaining contact with identified supports.
Unit SW provided insight, support, and psycho-education while maintaining contact with identified supports.

## 2022-12-09 NOTE — BH INPATIENT PSYCHIATRY PROGRESS NOTE - OTHER
more expressive less blunted less labile still digresses improving "okay" no frankly delusional content digresses, runs on

## 2022-12-09 NOTE — BH INPATIENT PSYCHIATRY PROGRESS NOTE - NSBHASSESSSUMMFT_PSY_ALL_CORE
Patient is a 62 year old male, domiciled alone, unemployed, , noncaregiver, PPHx with working diagnoses of: Bipolar 1 disorder, MRE manic and depressive disorder NOS, 1 prior psychiatric hospitalization at [facility] (10/23-11/26/2008), denies prior suicide attempts, not in outpt tx, denies hx of violence, denies hx of legal issues/prior arrests, denies substance use/prior withdrawal/DTs, PMH of T2DM, chronic back pain, +family history, +hx treatment response to depakote and lithium brought in by self presenting with multiple medical complaints found to be manic, irritable and disorganized.  He was admitted on a 939 legal status.  He has limited sleep, profuse and pressured verbal production, tangentiality/flight of ideas and an irritable labile dramatic affect.  He is unable to care for self.  He slept (11/2) after we increased depakote er to 1500mg at bedtime.  He was still manic and unable to care for self but with some mild improvement and a steady state depakote level of 65.  We increased depakote to 2000mg at bedtime and risperidone to 3mg at bedtime with some mild improvement.  He was still symptomatic on this regimen and with a depakote level of 81.  We transitioned from risperidone 3 to invega 6 at bedtime to minimize risk of orthostasis while preserving bedtime dosing and titrated paliperidone to 12mg at bedtime He was showing some improved impulse control and relatedness but sleep was poor, he was labile and disorganized. He continued with little progress so we started lithium 300 twice daily and will reduced depkaote to 1500 at bedtime per his request. He had been started on lasix for pedal edema but we discontinued because of concerns about complicating med regimen while making adjustments to mood stabilizers, etc.   Lithium seemed to be making some observable improvement and notably in his sleep.  We tapered paliperidone to discontinuation (last dose Sunday 12/4).  Lithium level the next day wa 0.4 and depakote level was in the 60s.  He seemed to be making progress with lithium so we increased it to 600mg twice daily.  This continued and we decreased depakote tonight to 500mg at bedtime.  We will obtain a lithium level monday am.    1. Continue inpatient hospitalization for safety and stabilization  2. haldol, ativan and benadryl prns  3. lithium 600mg twice daily (level 0.4 on 300 bid 12/5); discontinue paliperidone (last dose 12/4);decrease  depakote er to 500 at bedtime (level on 2000 at bedtime was 81; level on 1500 was in the 60s (12/5))  4. routine checks appropriate (can make needs known)  5. hx dm2--follow up a1c; will monitor for hyperlipidema  6. back pain--otc meds; wants motrin instead of tyleonol and will change 11/17  7. pedal edema dcd lasix, will continue to observe

## 2022-12-09 NOTE — BH TREATMENT PLAN - NSPTSTATEDGOAL_PSY_ALL_CORE
"to get out o fhere"
"to be discharged"
"to get out of here"
"I want to be discharged"
to be discharged and go back to work

## 2022-12-09 NOTE — BH INPATIENT PSYCHIATRY PROGRESS NOTE - NSBHFUPINTERVALHXFT_PSY_A_CORE
Staff report no interval events;  slept until 300.  Trazodone prn.  He can interrupt others in group but responds to verbal redirection.  He is prone to digressions and over productive but speech is less rapid and affect is more stable.  We are obtaining lithium level, a1c lipids monday am.  Labs yesterday (inadvertently ordered) showed cmp wnl cbc with slight leukocytosis c/w lithium.

## 2022-12-09 NOTE — BH TREATMENT PLAN - NSTXPLANTHERAPYSESSIONSFT_PSY_ALL_CORE
12-05-22  Type of therapy: Cognitive behavior therapy,Coping skills,Creative arts therapy,Leisure development,Mindfulness,Music therapy,Peer advocate  Type of session: Individual  Level of patient participation: Attentive,Engaged,Participates  Duration of participation: 15 minutes  Therapy conducted by: Psych rehab  Therapy Summary: Writer met with pt to discuss and assess the pt's progress towards specified psychiatric rehabilitation goal this week. On approach, the pt was in the day room and appeared frustrated that he had dropped something and began to get upset stating, "I cannot bend down how am I going to do anything?" Pt excalimed this in a whimpering tone and no tears emerged from his eyes. Pt's mood quickly changed to joyful and calm once writer began engaging him. Pt shared about his son, how he misses him dearly, and how he dedicates what he had created in group today to him.     Pt is currently making improvements towards specified psychiatric rehabilitation goal this week as he has started to demonstrate a reduction in hyperactive pacing and verbal intrusiveness. Today, although the pt continues to ask several questions and speak at length, he was more redirectable and responded to writer's feedback rather than speaking over her in conversation. Pt appears sleepy and on edge at times. Sleep log indicates that the pt has been starting to sleep more, however remains with broken sleep, sleeping no more than 2-4 hours at a time.    Pt's mood remains labile and pt continues to exhibit symptoms of artem. Pt continues to attend groups on a daily basis, at times is disruptive to the group process, however much more redirectable this week. Pt denies SI/HI/I/P and AH/VH/TH. Will continue to support.  
  11-08-22  Type of therapy: Cognitive behavior therapy,Coping skills,Music therapy,Peer advocate,Spirituality  Type of session: Individual  Level of patient participation: Participates,disorganized, illogical   Duration of participation: 30 minutes  Therapy conducted by: Psych rehab  Therapy Summary: Writer met with pt to discuss and assess the pt's progress towards specified psychiatric rehabilitation goal this week. On approach, pt amenable to talk with writer and was socially intrusive with a peer of his at that time. Before meeting with writer, the pt asked his peer that he was speaking to "Can I meet with her or do you want me to finish my story?" The peer kindly left the interaction with the pt as the pt does not engage in linear discussion and will hyperverbally ramble about several topics making for a difficult to follow discussion.    Pt is making slight progress towards specified psychiatric rehabilitation goal of exhibiting a reduction in manic symptoms such as pacing and being socially intrusive. Pt has demonstrated a slight reduction in manic symptoms mentioned above. Pt remains socially intrusive with poor boundaries and is hyperverbal. Pt has difficulty listening to writer's and other peer's feedback during a discussion and seems like he just needs to get his thoughts out. During discussion with writer today, the pt reinacted several scenes from a movie and presented in an extremely animated manner as he changed voices and facial affects to reflect the fact that he was playing several characters. Pt remains disorganized and illogial. Pt attending groups daily and requires immense redirection within the group context. Pt seems less irritable when asked to refrain from speaking while group is being directed. Pt denies SI /HI/I/P and AH/VH/TH. Pt encouraged to continue to engage in groups for skill development and positive socialization.    Will continue to support the pt during the current stay.  
  11-15-22  Type of therapy: Cognitive behavior therapy,Coping skills,Creative arts therapy,Leisure development,Mindfulness,Music therapy,Peer advocate,Pet therapy,Psychoeducation,Safety planning,Self esteem,Spirituality,Stress management,Symptom management  Type of session: Individual  Level of patient participation: Engaged  Duration of participation: 30 minutes  Therapy conducted by: Psych rehab  Therapy Summary: Writer met with pt. to assess progress of psych rehab goal over the past week. Pt. presented with a labile mood and a tense affect. Throughout the interview the pt. was hyper verbal, rambling and challenging to redirect. Pt. started the session becoming very emotional about his son who lives in [place] and began to become very angry when talking about the way that his wife had treated his son. Pt. stated that this was an immense trigger for him. As he continued in the conversation pt. began to appear paranoid about the wife and his mother-in-law stating that the MIL wanted to kill him and his son. Pt. was unable to clearly elaborate and stated that she “knew people” and that his wife made “threats” about him “kicking the can”. Pt. was challenging to follow as he then began to talk about his workplace and stated that it was a stressful environment and there were people there who were “in danger” and it was his concern when people were in danger because he was . Pt. was easily distracted stating “my ear canals are picking up other sounds, so it is hard for me to focus”. Pt. was challenging to redirect and was more focused on the pt. around him that the therapy session.     Throughout the session the pt. would jump between emotions and be sad in tears and then get very angry. When writer inquired about coping skills pt. stated that he would “meditate to the point where I can stop my heart”. Pt. stated that he would “jump back to consciousness” when someone would knock om the door or come into his room. When writer attempted to inquire further pt. demanded writer not to be scared and referred to writer as “my lady” when writer corrected pt. and stated that professionalism will remain by pt. calling writer by her name pt. became upset, rolled his eyes, exhaled deeply and asked to end the interview.     Over the past 7 days, the pt. has been visible on the unit, he has attended almost all the groups that are offered. Pt. is engaged with peers but can be intrusive and is challenging to redirect as he has poor boundaries. Pt. displays fair adl’s, fair behavioral control and medication compliant. pt. denies SI/HI/TH/AH/VH.     Pt. has made some progress as evidenced by pt. being slightly less pressured in speech but remains hyper verbal, intrusive and disorganized. Over the next seven days, Psychiatric rehabilitation staff will continue to provide encouragement, support, psychotherapy, and psychoeducation to assist the patient in the progression of established treatment goal  
  11-28-22  Type of therapy: Cognitive behavior therapy,Coping skills,Creative arts therapy,Leisure development,Mindfulness,Music therapy,Peer advocate,Spirituality  Type of session: Individual  Level of patient participation: Attentive,Engaged,Participates  Duration of participation: 15 minutes  Therapy conducted by: Psych rehab  Therapy Summary: Writer met with pt to discuss and assess the pt's progress towards specified psychiatric rehabilitation goal this week. On approach, the pt was in the day area of the unit and was coming out of the afternoon group. This week, pt is making improvements towards specified psychiatric rehabilitation goals as evidenced by him starting to exhibit less manic symptoms in the group context and is able to be more redirected both during 1:1 discussions and in the group context. Pt remains with elevated energy and hyperverbal/pressured speech therefore remaining with some manic symptoms. Today, the pt appears more well-kept, is freshly shaved, and seems better kept. Pt remains disorganized when he shares autonomously however there seems to be more linear content this week. Today, pt was able to engage and relate appropriatley in the group context. Pt reports feeling "good." Pt continues to report that there is "no such thing as Bipolar Disorder." Pt denies SI/HI/I/P and AH/VH/TH.    Will continue to support the pt during the current stay.  
no

## 2022-12-09 NOTE — BH TREATMENT PLAN - NSTXMANICGOAL_PSY_ALL_CORE
Demonstrate a substantial reduction in both hyperactive pacing on the unit and social intrusiveness

## 2022-12-09 NOTE — BH TREATMENT PLAN - NSCMSPTSTRENGTHS_PSY_ALL_CORE
Assertive
Future/goal oriented
Sense of Humor
Humor
Expressive of emotions/Future/goal oriented/Highly motivated for treatment/Intelligence

## 2022-12-09 NOTE — BH TREATMENT PLAN - NSTXPAINGOAL_PSY_ALL_CORE
Will identify 1 coping skill that distracts from pain

## 2022-12-10 RX ADMIN — LITHIUM CARBONATE 600 MILLIGRAM(S): 300 TABLET, EXTENDED RELEASE ORAL at 08:18

## 2022-12-10 RX ADMIN — Medication 650 MILLIGRAM(S): at 21:36

## 2022-12-10 RX ADMIN — DIVALPROEX SODIUM 500 MILLIGRAM(S): 500 TABLET, DELAYED RELEASE ORAL at 20:27

## 2022-12-10 RX ADMIN — LITHIUM CARBONATE 600 MILLIGRAM(S): 300 TABLET, EXTENDED RELEASE ORAL at 20:27

## 2022-12-10 RX ADMIN — POLYETHYLENE GLYCOL 3350 17 GRAM(S): 17 POWDER, FOR SOLUTION ORAL at 08:18

## 2022-12-10 RX ADMIN — Medication 650 MILLIGRAM(S): at 20:28

## 2022-12-11 RX ADMIN — LITHIUM CARBONATE 600 MILLIGRAM(S): 300 TABLET, EXTENDED RELEASE ORAL at 08:14

## 2022-12-11 RX ADMIN — POLYETHYLENE GLYCOL 3350 17 GRAM(S): 17 POWDER, FOR SOLUTION ORAL at 08:15

## 2022-12-11 RX ADMIN — LITHIUM CARBONATE 600 MILLIGRAM(S): 300 TABLET, EXTENDED RELEASE ORAL at 20:36

## 2022-12-11 RX ADMIN — DIVALPROEX SODIUM 500 MILLIGRAM(S): 500 TABLET, DELAYED RELEASE ORAL at 20:36

## 2022-12-11 RX ADMIN — Medication 650 MILLIGRAM(S): at 20:39

## 2022-12-12 LAB
A1C WITH ESTIMATED AVERAGE GLUCOSE RESULT: 6.5 % — HIGH (ref 4–5.6)
CHOLEST SERPL-MCNC: 123 MG/DL — SIGNIFICANT CHANGE UP
ESTIMATED AVERAGE GLUCOSE: 140 — SIGNIFICANT CHANGE UP
HDLC SERPL-MCNC: 44 MG/DL — SIGNIFICANT CHANGE UP
LIPID PNL WITH DIRECT LDL SERPL: 60 MG/DL — SIGNIFICANT CHANGE UP
LITHIUM SERPL-MCNC: 0.5 MMOL/L — LOW (ref 0.6–1.2)
NON HDL CHOLESTEROL: 79 MG/DL — SIGNIFICANT CHANGE UP
TRIGL SERPL-MCNC: 94 MG/DL — SIGNIFICANT CHANGE UP

## 2022-12-12 PROCEDURE — 90853 GROUP PSYCHOTHERAPY: CPT

## 2022-12-12 PROCEDURE — 99232 SBSQ HOSP IP/OBS MODERATE 35: CPT

## 2022-12-12 RX ORDER — LITHIUM CARBONATE 300 MG/1
900 TABLET, EXTENDED RELEASE ORAL AT BEDTIME
Refills: 0 | Status: DISCONTINUED | OUTPATIENT
Start: 2022-12-12 | End: 2022-12-16

## 2022-12-12 RX ORDER — LITHIUM CARBONATE 300 MG/1
600 TABLET, EXTENDED RELEASE ORAL DAILY
Refills: 0 | Status: DISCONTINUED | OUTPATIENT
Start: 2022-12-12 | End: 2022-12-16

## 2022-12-12 RX ADMIN — LITHIUM CARBONATE 900 MILLIGRAM(S): 300 TABLET, EXTENDED RELEASE ORAL at 20:41

## 2022-12-12 RX ADMIN — POLYETHYLENE GLYCOL 3350 17 GRAM(S): 17 POWDER, FOR SOLUTION ORAL at 08:17

## 2022-12-12 RX ADMIN — Medication 650 MILLIGRAM(S): at 22:00

## 2022-12-12 RX ADMIN — Medication 650 MILLIGRAM(S): at 20:50

## 2022-12-12 RX ADMIN — LITHIUM CARBONATE 600 MILLIGRAM(S): 300 TABLET, EXTENDED RELEASE ORAL at 08:13

## 2022-12-12 NOTE — BH INPATIENT PSYCHIATRY PROGRESS NOTE - OTHER
digresses, runs on  more expressive no frankly delusional content still digresses improving less blunted less labile "alright"

## 2022-12-12 NOTE — BH INPATIENT PSYCHIATRY PROGRESS NOTE - NSBHFUPINTERVALHXFT_PSY_A_CORE
Staff report compliant with medication, tylenol prns.  They report poor sleep friday night but slept until 0430 saturday night and 0500 last night (sunday).  The relate he became a little upset yesterday when he thought someone had stolen his notebook but he was able to calm himself down.  Lithium level 0.5 today.  They relate and we see on exam that he is preoccupeid wwith his son and his ex-wife and we provide support and help him set boundaries for himself.  We agreed to discontinue depakote today and will increase lithium to 600/900.

## 2022-12-12 NOTE — BH INPATIENT PSYCHIATRY PROGRESS NOTE - NSBHASSESSSUMMFT_PSY_ALL_CORE
Patient is a 62 year old male, domiciled alone, unemployed, , noncaregiver, PPHx with working diagnoses of: Bipolar 1 disorder, MRE manic and depressive disorder NOS, 1 prior psychiatric hospitalization at [facility] (10/23-11/26/2008), denies prior suicide attempts, not in outpt tx, denies hx of violence, denies hx of legal issues/prior arrests, denies substance use/prior withdrawal/DTs, PMH of T2DM, chronic back pain, +family history, +hx treatment response to depakote and lithium brought in by self presenting with multiple medical complaints found to be manic, irritable and disorganized.  He was admitted on a 939 legal status.  He has limited sleep, profuse and pressured verbal production, tangentiality/flight of ideas and an irritable labile dramatic affect.  He is unable to care for self.  He slept (11/2) after we increased depakote er to 1500mg at bedtime.  He was still manic and unable to care for self but with some mild improvement and a steady state depakote level of 65.  We increased depakote to 2000mg at bedtime and risperidone to 3mg at bedtime with some mild improvement.  He was still symptomatic on this regimen and with a depakote level of 81.  We transitioned from risperidone 3 to invega 6 at bedtime to minimize risk of orthostasis while preserving bedtime dosing and titrated paliperidone to 12mg at bedtime He was showing some improved impulse control and relatedness but sleep was poor, he was labile and disorganized. He continued with little progress so we started lithium 300 twice daily and will reduced depkaote to 1500 at bedtime per his request. He had been started on lasix for pedal edema but we discontinued because of concerns about complicating med regimen while making adjustments to mood stabilizers, etc.   Lithium seemed to be making some observable improvement and notably in his sleep.  We tapered paliperidone to discontinuation (last dose Sunday 12/4).  Lithium level the next day wa 0.4 and depakote level was in the 60s.  He seemed to be making progress with lithium so we increased it to 600mg twice daily.  We tapered depakote to discontinutation while titrating lithium because depakote did not appear to be particualrly usefl.  A lithium level on 600 twice daily was 0.5 and we agreed to increase to 600/900.     1. Continue inpatient hospitalization for safety and stabilization  2. haldol, ativan and benadryl prns  3. lithium 600mg q am and 900mg at bedtime (dose #1 tonight); level on 600 twice daily was 0.5  (level 0.4 on 300 bid 12/5); discontinue paliperidone (last dose 12/4);decrease  depakote er tapered to discotninuation, last dose 12/11.    4. routine checks appropriate (can make needs known)  5. hx dm2--follow up a1c; will monitor for hyperlipidema  6. back pain--otc meds; wants motrin instead of tyleonol and will change 11/17  7. pedal edema dcd lasix, will continue to observe

## 2022-12-13 PROCEDURE — 99231 SBSQ HOSP IP/OBS SF/LOW 25: CPT

## 2022-12-13 PROCEDURE — 90853 GROUP PSYCHOTHERAPY: CPT

## 2022-12-13 RX ORDER — LITHIUM CARBONATE 300 MG/1
1 TABLET, EXTENDED RELEASE ORAL
Qty: 30 | Refills: 0
Start: 2022-12-13 | End: 2023-01-11

## 2022-12-13 RX ORDER — LITHIUM CARBONATE 300 MG/1
1 TABLET, EXTENDED RELEASE ORAL
Qty: 60 | Refills: 0
Start: 2022-12-13 | End: 2023-01-11

## 2022-12-13 RX ADMIN — Medication 650 MILLIGRAM(S): at 20:46

## 2022-12-13 RX ADMIN — Medication 650 MILLIGRAM(S): at 21:30

## 2022-12-13 RX ADMIN — POLYETHYLENE GLYCOL 3350 17 GRAM(S): 17 POWDER, FOR SOLUTION ORAL at 08:12

## 2022-12-13 RX ADMIN — LITHIUM CARBONATE 900 MILLIGRAM(S): 300 TABLET, EXTENDED RELEASE ORAL at 20:45

## 2022-12-13 RX ADMIN — LITHIUM CARBONATE 600 MILLIGRAM(S): 300 TABLET, EXTENDED RELEASE ORAL at 08:12

## 2022-12-13 NOTE — BH INPATIENT PSYCHIATRY PROGRESS NOTE - OTHER
partial "okay" no frankly delusional content much more linear and goal directed.  Digresses at times but easily verbally prompted/redirected.  Writes poetry much more stable and supple, mild bluntedness more easily interrupted

## 2022-12-13 NOTE — BH INPATIENT PSYCHIATRY PROGRESS NOTE - CURRENT MEDICATION
MEDICATIONS  (STANDING):  lithium 600 milliGRAM(s) Oral daily  lithium 900 milliGRAM(s) Oral at bedtime  polyethylene glycol 3350 17 Gram(s) Oral daily    MEDICATIONS  (PRN):  acetaminophen    Suspension .. 650 milliGRAM(s) Oral every 6 hours PRN Temp greater or equal to 38C (100.4F), Mild Pain (1 - 3), Moderate Pain (4 - 6)  diphenhydrAMINE 50 milliGRAM(s) Oral every 6 hours PRN agitation  diphenhydrAMINE Injectable 50 milliGRAM(s) IntraMuscular once PRN agitation  haloperidol     Tablet 5 milliGRAM(s) Oral every 6 hours PRN agitation  haloperidol    Injectable 5 milliGRAM(s) IntraMuscular once PRN Agitation  ibuprofen  Tablet. 600 milliGRAM(s) Oral every 6 hours PRN Mild Pain (1 - 3), Moderate Pain (4 - 6)  LORazepam     Tablet 2 milliGRAM(s) Oral every 6 hours PRN Agitation  LORazepam   Injectable 2 milliGRAM(s) IntraMuscular Once PRN Agitation  melatonin. 5 milliGRAM(s) Oral at bedtime PRN Insomnia  senna 2 Tablet(s) Oral daily PRN constipation  traZODone 50 milliGRAM(s) Oral at bedtime PRN insomnia

## 2022-12-13 NOTE — BH INPATIENT PSYCHIATRY PROGRESS NOTE - NSBHFUPINTERVALHXFT_PSY_A_CORE
Staff report compliant with medication, tylenol prns.  Slept until approxiamtely 0400 so he is getting much more sleep than admission.  He attends groups, he wants to return to work.  Less preoccupied with family today.

## 2022-12-13 NOTE — BH INPATIENT PSYCHIATRY PROGRESS NOTE - NSBHASSESSSUMMFT_PSY_ALL_CORE
Patient is a 62 year old male, domiciled alone, unemployed, , noncaregiver, PPHx with working diagnoses of: Bipolar 1 disorder, MRE manic and depressive disorder NOS, 1 prior psychiatric hospitalization at [facility] (10/23-11/26/2008), denies prior suicide attempts, not in outpt tx, denies hx of violence, denies hx of legal issues/prior arrests, denies substance use/prior withdrawal/DTs, PMH of T2DM, chronic back pain, +family history, +hx treatment response to depakote and lithium brought in by self presenting with multiple medical complaints found to be manic, irritable and disorganized.  He was admitted on a 939 legal status.  He has limited sleep, profuse and pressured verbal production, tangentiality/flight of ideas and an irritable labile dramatic affect.  He is unable to care for self.  He slept (11/2) after we increased depakote er to 1500mg at bedtime.  He was still manic and unable to care for self but with some mild improvement and a steady state depakote level of 65.  We increased depakote to 2000mg at bedtime and risperidone to 3mg at bedtime with some mild improvement.  He was still symptomatic on this regimen and with a depakote level of 81.  We transitioned from risperidone 3 to invega 6 at bedtime to minimize risk of orthostasis while preserving bedtime dosing and titrated paliperidone to 12mg at bedtime He was showing some improved impulse control and relatedness but sleep was poor, he was labile and disorganized. He continued with little progress so we started lithium 300 twice daily and will reduced depkaote to 1500 at bedtime per his request. He had been started on lasix for pedal edema but we discontinued because of concerns about complicating med regimen while making adjustments to mood stabilizers, etc.   Lithium seemed to be making some observable improvement and notably in his sleep.  We tapered paliperidone to discontinuation (last dose Sunday 12/4).  Lithium level the next day wa 0.4 and depakote level was in the 60s.  He seemed to be making progress with lithium so we increased it to 600mg twice daily.  We tapered depakote to discontinutation while titrating lithium because depakote did not appear to be particualrly usefl.  A lithium level on 600 twice daily was 0.5 and we agreed to increase to 600/900.   He contineus to show improvement in affect, organization and sleep.  1. Continue inpatient hospitalization for safety and stabilization  2. haldol, ativan and benadryl prns  3. lithium 600mg q am and 900mg at bedtime (dose #1 tonight); level on 600 twice daily was 0.5  (level 0.4 on 300 bid 12/5); discontinue paliperidone (last dose 12/4);decrease  depakote er tapered to discotninuation, last dose 12/11.    4. routine checks appropriate (can make needs known)  5. hx dm2--follow up a1c; will monitor for hyperlipidema  6. back pain--otc meds; wants motrin instead of tyleonol and will change 11/17  7. pedal edema dcd lasix, will continue to observe

## 2022-12-14 PROCEDURE — 99231 SBSQ HOSP IP/OBS SF/LOW 25: CPT

## 2022-12-14 RX ADMIN — LITHIUM CARBONATE 900 MILLIGRAM(S): 300 TABLET, EXTENDED RELEASE ORAL at 20:57

## 2022-12-14 RX ADMIN — POLYETHYLENE GLYCOL 3350 17 GRAM(S): 17 POWDER, FOR SOLUTION ORAL at 08:19

## 2022-12-14 RX ADMIN — Medication 650 MILLIGRAM(S): at 21:02

## 2022-12-14 RX ADMIN — Medication 650 MILLIGRAM(S): at 21:45

## 2022-12-14 RX ADMIN — LITHIUM CARBONATE 600 MILLIGRAM(S): 300 TABLET, EXTENDED RELEASE ORAL at 08:19

## 2022-12-14 NOTE — BH DISCHARGE NOTE NURSING/SOCIAL WORK/PSYCH REHAB - DISCHARGE INSTRUCTIONS AFTERCARE APPOINTMENTS
In order to check the location, date, or time of your aftercare appointment, please refer to your Discharge Instructions Document given to you upon leaving the hospital.  If you have lost the instructions please call [phone]

## 2022-12-14 NOTE — BH DISCHARGE NOTE NURSING/SOCIAL WORK/PSYCH REHAB - NSDCPRGOAL_PSY_ALL_CORE
Writer met with patient to verbally safety plan for discharge and discuss the patient’s progress throughout the inpatient stay. Throughout the current stay, the ptient was receptive to safety planning and readily identified coping skills, triggers, social support, and reasons for living. Upon admission, patient presented with manic symptoms and was not sleeping, presented with elevated mood, and energy. During the inpatient stay, the pt was mostly visible on the unit, participated in the majority of groups, and demonstrated a significant reduction in manic symptoms. At discharge, the patient presents as calm, cooperative, and mostly regulated. Pt remains talkative and animated, however not exhibiting lability, elevated mood, or impulsivity upon discharge. Pt exhibits future oriented thoughts and reports that he is going to remain on his current medication regiment and engage in treatment. Writer commended the pt and encouraged the pt to engage in outpatient treatment for medication management, support, and psychotherapy services. The patient was able to make good progress towards specified treatment goal during the current stay. The patient exhibits daily medication compliance, fair ADLs, and good behavioral control. The patient denies SI/HI/AH/VH upon discharge.

## 2022-12-14 NOTE — BH DISCHARGE NOTE NURSING/SOCIAL WORK/PSYCH REHAB - PATIENT PORTAL LINK FT
You can access the FollowMyHealth Patient Portal offered by [facility] by registering at the following website: [email or web address]. By joining [facility]’s FollowMyHealth portal, you will also be able to view your health information using other applications (apps) compatible with our system.

## 2022-12-14 NOTE — BH INPATIENT PSYCHIATRY PROGRESS NOTE - NSBHFUPINTERVALHXFT_PSY_A_CORE
Staff report compliant with medication, tylenol prns.  He slept.  He was a bit anxious and distressed after return to work related phone calls, preoccupied with us getting paper work exchanged with appropriate people as quickly as possible.  He was able to calm down.  He seemed to be somewhat surprised about some details of those phone calls and appropriately so.  His distress was not too extreme and was also understandable and again he was able to calm down.

## 2022-12-14 NOTE — BH DISCHARGE NOTE NURSING/SOCIAL WORK/PSYCH REHAB - NSBHDCADDR2FT_A_CORE
[id number] [place] [address], [place], [place], [address]  For [id number] [place] [address], [place], [place], [address]

## 2022-12-14 NOTE — BH INPATIENT PSYCHIATRY PROGRESS NOTE - OTHER
more easily interrupted no frankly delusional content; preoccupied with work related paperwork  partial "anxious" much more linear and goal directed.  more anxious today but returns to neutral calm

## 2022-12-14 NOTE — BH DISCHARGE NOTE NURSING/SOCIAL WORK/PSYCH REHAB - NSDCPRRECOMMEND_PSY_ALL_CORE
Psychiatric Rehabilitation staff recommends that the patient engage in outpatient services for continued medication and symptom management. Upon discharge, patient has an appointment scheduled with [facility] Agu[facility].

## 2022-12-14 NOTE — BH INPATIENT PSYCHIATRY PROGRESS NOTE - NSBHASSESSSUMMFT_PSY_ALL_CORE
Patient is a 62 year old male, domiciled alone, unemployed, , noncaregiver, PPHx with working diagnoses of: Bipolar 1 disorder, MRE manic and depressive disorder NOS, 1 prior psychiatric hospitalization at [facility] (10/23-11/26/2008), denies prior suicide attempts, not in outpt tx, denies hx of violence, denies hx of legal issues/prior arrests, denies substance use/prior withdrawal/DTs, PMH of T2DM, chronic back pain, +family history, +hx treatment response to depakote and lithium brought in by self presenting with multiple medical complaints found to be manic, irritable and disorganized.  He was admitted on a 939 legal status.  He has limited sleep, profuse and pressured verbal production, tangentiality/flight of ideas and an irritable labile dramatic affect.  He is unable to care for self.  He slept (11/2) after we increased depakote er to 1500mg at bedtime.  He was still manic and unable to care for self but with some mild improvement and a steady state depakote level of 65.  We increased depakote to 2000mg at bedtime and risperidone to 3mg at bedtime with some mild improvement.  He was still symptomatic on this regimen and with a depakote level of 81.  We transitioned from risperidone 3 to invega 6 at bedtime to minimize risk of orthostasis while preserving bedtime dosing and titrated paliperidone to 12mg at bedtime He was showing some improved impulse control and relatedness but sleep was poor, he was labile and disorganized. He continued with little progress so we started lithium 300 twice daily and will reduced depkaote to 1500 at bedtime per his request. He had been started on lasix for pedal edema but we discontinued because of concerns about complicating med regimen while making adjustments to mood stabilizers, etc.   Lithium seemed to be making some observable improvement and notably in his sleep.  We tapered paliperidone to discontinuation (last dose Sunday 12/4).  Lithium level the next day wa 0.4 and depakote level was in the 60s.  He seemed to be making progress with lithium so we increased it to 600mg twice daily.  We tapered depakote to discontinutation while titrating lithium because depakote did not appear to be particualrly usefl.  A lithium level on 600 twice daily was 0.5 and we agreed to increase to 600/900.   He continues to show improvement in affect, organization and sleep.  He was somewhat anxious as discharge derik near and his work required paperwork and so forth.  1. Continue inpatient hospitalization for safety and stabilization  2. haldol, ativan and benadryl prns  3. lithium 600mg q am and 900mg at bedtime; level on 600 twice daily was 0.5  (level 0.4 on 300 bid 12/5); discontinue paliperidone (last dose 12/4);decrease  depakote er tapered to discotninuation, last dose 12/11.    4. routine checks appropriate (can make needs known)  5. hx dm2--follow up a1c; will monitor for hyperlipidema  6. back pain--otc meds; wants motrin instead of tyleonol and will change 11/17  7. pedal edema dcd lasix, will continue to observe

## 2022-12-15 PROCEDURE — 90853 GROUP PSYCHOTHERAPY: CPT

## 2022-12-15 PROCEDURE — 99231 SBSQ HOSP IP/OBS SF/LOW 25: CPT

## 2022-12-15 RX ADMIN — POLYETHYLENE GLYCOL 3350 17 GRAM(S): 17 POWDER, FOR SOLUTION ORAL at 08:00

## 2022-12-15 RX ADMIN — LITHIUM CARBONATE 600 MILLIGRAM(S): 300 TABLET, EXTENDED RELEASE ORAL at 08:00

## 2022-12-15 RX ADMIN — LITHIUM CARBONATE 900 MILLIGRAM(S): 300 TABLET, EXTENDED RELEASE ORAL at 20:42

## 2022-12-15 RX ADMIN — Medication 650 MILLIGRAM(S): at 20:43

## 2022-12-15 RX ADMIN — Medication 650 MILLIGRAM(S): at 21:43

## 2022-12-15 NOTE — BH PSYCHOLOGY - GROUP THERAPY NOTE - NSBHPSYCHOLGRPFREQ_PSY_A_CORE
Daily

## 2022-12-15 NOTE — BH PSYCHOLOGY - GROUP THERAPY NOTE - NSPSYCHOLGRPGENINT_PSY_A_CORE
cognitive/behavioral therapy/problem solving techniques discussed/stress management/supported coping skills/supportive therapy/treatment compliance encouraged/social skills training
cognitive/behavioral therapy/problem solving techniques discussed/stress management/supported coping skills/supportive therapy/social skills training
cognitive/behavioral therapy/problem solving techniques discussed/stress management/supported coping skills/supportive therapy/treatment compliance encouraged/social skills training

## 2022-12-15 NOTE — BH PSYCHOLOGY - GROUP THERAPY NOTE - NSBHPSYCHOLDISCUSS_PSY_A_CORE
Discussion with collaborating staff took place since patient's last visit

## 2022-12-15 NOTE — BH PSYCHOLOGY - GROUP THERAPY NOTE - NSBHPSYCHOLPARTICIP_PSY_A_CORE
Fully engaged
Partially engaged
Fully engaged
Partially engaged
Fully engaged
Fully engaged
Partially engaged
Fully engaged
Resistant to interventions
Fully engaged
Resistant to interventions

## 2022-12-15 NOTE — BH PSYCHOLOGY - GROUP THERAPY NOTE - NSBHPSYCHOLASSESSPROV_PSY_A_CORE
Licensed Psychologist
Licensed Psychologist and Psychology Trainee
Psychology Trainee only
Licensed Psychologist and Psychology Trainee
Licensed Psychologist
Licensed Psychologist and Psychology Trainee
Licensed Psychologist
Licensed Psychologist and Psychology Trainee
Licensed Psychologist and Psychology Trainee
Licensed Psychologist
Licensed Psychologist and Psychology Trainee
Psychology Trainee only
Licensed Psychologist and Psychology Trainee
Licensed Psychologist and Psychology Trainee

## 2022-12-15 NOTE — BH PSYCHOLOGY - GROUP THERAPY NOTE - NSBHPTASSESSDT_PSY_A_CORE
05-Dec-2022 11:00
08-Dec-2022 11:00
13-Dec-2022 11:00
15-Nov-2022 11:00
04-Nov-2022 11:00
18-Nov-2022 11:00
21-Nov-2022 11:00
25-Nov-2022 11:00
09-Dec-2022 11:00
06-Dec-2022 11:00
29-Nov-2022 11:00
11-Nov-2022 11:00
01-Dec-2022 11:00
22-Nov-2022 11:00
28-Nov-2022 11:00
07-Nov-2022 11:00
12-Dec-2022 11:00
15-Dec-2022 11:00
30-Nov-2022 11:00
02-Dec-2022 11:00
08-Nov-2022 11:00
14-Nov-2022 11:00

## 2022-12-15 NOTE — BH INPATIENT PSYCHIATRY PROGRESS NOTE - NSBHFUPINTERVALHXFT_PSY_A_CORE
Staff report compliant with medication, tylenol prns.  He slept but was awake for about an hour.  We reviewed paperwork--i informed him I am unable to to give a meaningful opinion about his fitness to return to work at this time as he needs ot re-adjust/re-acclimate to his community.He was accepting of this.  He is stressed about short term disability and we offered to allow him to stay an extra day to ease things logistically and he agreed to convert to volutnary status.

## 2022-12-15 NOTE — BH PSYCHOLOGY - GROUP THERAPY NOTE - NSBHPSYCHOLGRPDUR_PSY_A_CORE
45 minutes

## 2022-12-15 NOTE — BH INPATIENT PSYCHIATRY PROGRESS NOTE - OTHER
partial much more linear and goal directed.  no frankly delusional content; preoccupied with work related paperwork  "okay...a little anxious" anxious but calmer than yesterday more easily interrupted

## 2022-12-15 NOTE — BH PSYCHOLOGY - GROUP THERAPY NOTE - NSBHPSYCHOLPARTICIPCOMMENT_PSY_A_CORE FT
Patient appeared attentive and interested in the group discussion.  Although the patient did not contribute spontaneously during the group session, patient was able to read from the worksheet when prompted. Patient was able to engage with the  and other members appropriately.
Patient arrived late to the group but read from the worksheet when asked. Although he was able to pay attention throughout most of the group discussion, patient was observed at times to be napping during the session.
Patient appeared interested and attentive to the group session. Patient actively contributed to the group discussion. Patient required frequent redirection to keep contributions relevant to the group topic. Patient was able to follow redirection.
Patient appeared interested and attentive to the group session. Patient actively contributed to the group discussion. Patient was observed closing his eyes but could read aloud when prompted. Patient was able to engage with  and other members appropriately.
Patient participated actively in the session but  required redirection several times during the group therapy session.  Patient was able to respond positively to redirection by the psychologist during the group discussion.
 Patient arrived late to the group session. Patient frequently interjected the discussion and spoke about content not pertaining to the group’s discussion. Patient required frequent redirection and explanation of group rules. Patient was not able follow redirection or instructions. 
Patient appeared interested and attentive to the group session. Patient actively contributed to the group discussion. Patient frequently interrupted the discussion and required redirection from the  to keep contributions relevant to the group topic. Patient responded well to the redirection.  
Patient participated actively in the session but required redirection several times during the group therapy session.  Patient was able to respond positively to redirection by the psychologist during the group discussion.
Patient appeared interested and attentive to the group session. Patient actively contributed to the group discussion. Patient required frequent redirection to keep contributions relevant to the group topic. Patient responded well to redirection. Patient was observed sleeping for the remainder of the group session.
Patient arrived late to the group session and appeared interested and attentive to the discussion. Patient interrupted the discussion but was able to follow redirection from the . Patient then appropriately contributed to the group discussion. 
Patient arrived late to the group session. Patient appeared interested and attentive to the group session. Patient actively contributed to the group discussion and was able to read out loud when  requested volunteers. Patient required redirection to keep contributions relevant to the group topic. Patient was able to follow redirection. Patient left the group briefly and asked a fellow group member to come with him. Patient then returned to the group. 
Patient arrived late to the group therapy session. He required frequent redirection from the , interjecting during the discussion. At one point he appeared to be napping during the reading of the worksheet.
Patient was appropriate during most of the group discussion. However, he was observed to be napping during parts of the session. 
Patient appeared interested and attentive to the group session. Patient frequently interjected the discussion and interrupted group members. Patient was able to follow redirection from the . Patient was able to read out loud when prompted. 
Patient appeared interested and attentive to the group session. Patient actively contributed to the group discussion. Patient required frequent redirection to keep contributions relevant to the group topic. Patient briefly left the group and returned to his seat. 
Patient participated actively in the session but was moderately disorganized and required redirection several times during the group therapy session.  Patient was able to respond positively to redirection by the psychologist during the group discussion.
Patient participated actively in the session but required redirection several times during the group therapy session.  Patient was able to respond positively to redirection by the psychologist during the group discussion.
Patient appeared interested and attentive to the group session. Patient actively contributed to the group discussion. Patient required redirection to keep contributions relevant to the group topic. Patient was able to follow redirection.
Patient appeared interested and attentive to the group session. Patient actively contributed to the group discussion. Patient frequently interrupted the discussion when group members were speaking and required redirection from the  to keep contributions relevant to the group topic. Patient responded well to the redirection.
Patient participated actively in the session but required redirection several times during the group therapy session.  Patient was able to respond positively to redirection by the psychologist during the group discussion.
Patient participated actively in the session but required redirection several times during the group therapy session.  Patient was able to respond positively to redirection by the psychologist during the group discussion.
Patient appeared interested and attentive to the group session. Patient actively contributed to the group discussion. Patient was observed closing his eyes but could read aloud when prompted. Patient was able to engage with  and other members appropriately. Patient briefly left the group session and returned to his seat.

## 2022-12-15 NOTE — BH PSYCHOLOGY - GROUP THERAPY NOTE - NSPSYCHOLGRPGENGOAL_PSY_A_CORE
assessment/decrease symptoms/improve level of independent functioning/improve social/vocational/coping skills/prevent relapse/psychoeducation/treatment compliance
assessment/decrease symptoms/improve level of independent functioning/improve social/vocational/coping skills/psychoeducation/treatment compliance
assessment/decrease symptoms/improve level of independent functioning/improve social/vocational/coping skills/psychoeducation/treatment compliance
assessment/decrease symptoms/improve level of independent functioning/improve social/vocational/coping skills/prevent relapse/psychoeducation/treatment compliance
assessment/decrease symptoms/improve level of independent functioning/improve social/vocational/coping skills/prevent relapse/psychoeducation/treatment compliance
assessment/decrease symptoms/improve level of independent functioning/improve social/vocational/coping skills/psychoeducation/treatment compliance
assessment/decrease symptoms/improve level of independent functioning/improve social/vocational/coping skills/prevent relapse/psychoeducation/treatment compliance
assessment/decrease symptoms/improve level of independent functioning/improve social/vocational/coping skills/psychoeducation/treatment compliance
assessment/decrease symptoms/improve level of independent functioning/improve social/vocational/coping skills/prevent relapse/psychoeducation/treatment compliance
assessment/decrease symptoms/improve level of independent functioning/improve social/vocational/coping skills/psychoeducation/treatment compliance
assessment/decrease symptoms/improve level of independent functioning/improve social/vocational/coping skills/prevent relapse/psychoeducation/treatment compliance
assessment/decrease symptoms/improve level of independent functioning/improve social/vocational/coping skills/prevent relapse/psychoeducation/treatment compliance
assessment/decrease symptoms/improve level of independent functioning/improve social/vocational/coping skills/psychoeducation/treatment compliance
assessment/decrease symptoms/improve level of independent functioning/improve social/vocational/coping skills/psychoeducation/treatment compliance
assessment/decrease symptoms/improve level of independent functioning/improve social/vocational/coping skills/prevent relapse/psychoeducation/treatment compliance
assessment/decrease symptoms/improve level of independent functioning/improve social/vocational/coping skills/psychoeducation/treatment compliance
assessment/decrease symptoms/improve level of independent functioning/improve social/vocational/coping skills/prevent relapse/psychoeducation/treatment compliance
assessment/decrease symptoms/improve level of independent functioning/improve social/vocational/coping skills/psychoeducation/treatment compliance
assessment/decrease symptoms/improve level of independent functioning/improve social/vocational/coping skills/prevent relapse/psychoeducation/treatment compliance
assessment/decrease symptoms/improve level of independent functioning/improve social/vocational/coping skills/prevent relapse/psychoeducation/treatment compliance
assessment/decrease symptoms/improve level of independent functioning/improve social/vocational/coping skills/psychoeducation/treatment compliance
assessment/decrease symptoms/improve level of independent functioning/improve social/vocational/coping skills/prevent relapse/psychoeducation/treatment compliance

## 2022-12-15 NOTE — BH PSYCHOLOGY - GROUP THERAPY NOTE - NSPSYCHOLGRPGENPROB_PSY_A_CORE
academic/vocational/social dysfunction/anxiety/depression

## 2022-12-15 NOTE — BH INPATIENT PSYCHIATRY PROGRESS NOTE - NSBHASSESSSUMMFT_PSY_ALL_CORE
Patient is a 62 year old male, domiciled alone, unemployed, , noncaregiver, PPHx with working diagnoses of: Bipolar 1 disorder, MRE manic and depressive disorder NOS, 1 prior psychiatric hospitalization at [facility] (10/23-11/26/2008), denies prior suicide attempts, not in outpt tx, denies hx of violence, denies hx of legal issues/prior arrests, denies substance use/prior withdrawal/DTs, PMH of T2DM, chronic back pain, +family history, +hx treatment response to depakote and lithium brought in by self presenting with multiple medical complaints found to be manic, irritable and disorganized.  He was admitted on a 939 legal status.  He has limited sleep, profuse and pressured verbal production, tangentiality/flight of ideas and an irritable labile dramatic affect.  He is unable to care for self.  He slept (11/2) after we increased depakote er to 1500mg at bedtime.  He was still manic and unable to care for self but with some mild improvement and a steady state depakote level of 65.  We increased depakote to 2000mg at bedtime and risperidone to 3mg at bedtime with some mild improvement.  He was still symptomatic on this regimen and with a depakote level of 81.  We transitioned from risperidone 3 to invega 6 at bedtime to minimize risk of orthostasis while preserving bedtime dosing and titrated paliperidone to 12mg at bedtime He was showing some improved impulse control and relatedness but sleep was poor, he was labile and disorganized. He continued with little progress so we started lithium 300 twice daily and will reduced depkaote to 1500 at bedtime per his request. He had been started on lasix for pedal edema but we discontinued because of concerns about complicating med regimen while making adjustments to mood stabilizers, etc.   Lithium seemed to be making some observable improvement and notably in his sleep.  We tapered paliperidone to discontinuation (last dose Sunday 12/4).  Lithium level the next day wa 0.4 and depakote level was in the 60s.  He seemed to be making progress with lithium so we increased it to 600mg twice daily.  We tapered depakote to discontinutation while titrating lithium because depakote did not appear to be particualrly usefl.  A lithium level on 600 twice daily was 0.5 and we agreed to increase to 600/900.   He continues to show improvement in affect, organization and sleep.  He was somewhat anxious as discharge derik near and his work required paperwork and so forth.  1. Continue inpatient hospitalization for safety and stabilization  2. haldol, ativan and benadryl prns  3. lithium 600mg q am and 900mg at bedtime; level on 600 twice daily was 0.5  (level 0.4 on 300 bid 12/5); discontinue paliperidone (last dose 12/4);decrease  depakote er tapered to discotninuation, last dose 12/11.    4. routine checks appropriate (can make needs known)  5. hx dm2--follow up a1c; will monitor for hyperlipidema  6. back pain--otc meds; wants motrin instead of tyleonol and will change 11/17  7. pedal edema dcd lasix, will continue to observe

## 2022-12-15 NOTE — BH PSYCHOLOGY - GROUP THERAPY NOTE - NSBHPSYCHOLGRPTYPE_PSY_A_CORE
General Group Therapy

## 2022-12-15 NOTE — BH PSYCHOLOGY - GROUP THERAPY NOTE - NSBHPSYCHOLADHERE_PSY_A_CORE
Fair
Fair
Good
Fair
Good
Good
Poor
Fair
Fair
Good
Fair
Good
Good
Fair
Fair
Good
Fair
Good
Fair
Good
Good
Poor

## 2022-12-15 NOTE — BH PSYCHOLOGY - GROUP THERAPY NOTE - NSBHPSYCHOLRESPONSE_PSY_A_CORE
Symptoms reduced/Coping skills acquired/Insight displayed/Accepted support
Symptoms reduced/Coping skills acquired/Accepted support
Symptoms reduced/Coping skills acquired/Accepted support
Symptoms reduced/Accepted support
Symptoms reduced/Coping skills acquired/Insight displayed/Accepted support
Symptoms reduced/Coping skills acquired/Insight displayed/Accepted support
Symptoms reduced/Accepted support
Symptoms reduced/Coping skills acquired/Insight displayed
other...
Accepted support
Symptoms reduced/Coping skills acquired/Insight displayed/Accepted support
Symptoms reduced/Coping skills acquired/Accepted support
Symptoms reduced/Coping skills acquired/Insight displayed/Accepted support
Symptoms reduced/Coping skills acquired/Accepted support
Symptoms reduced/Coping skills acquired/Insight displayed/Accepted support
Symptoms reduced/Coping skills acquired/Insight displayed/Accepted support
Symptoms reduced/Coping skills acquired/Accepted support
Symptoms reduced/Coping skills acquired/Insight displayed/Accepted support
Symptoms reduced/Accepted support
Symptoms reduced/Coping skills acquired/Accepted support

## 2022-12-15 NOTE — BH PSYCHOLOGY - GROUP THERAPY NOTE - NSPSYCHOLGRPGENPT_PSY_A_CORE FT
Patient attended the cognitive behavior therapy group session. Group session focused on the stages of change.  Discussion revolved around learning how to navigate the stages of Precontemplation, Contemplation, Preparation, Action, and Maintenance. Techniques focusing on learning about the different aspects of each stage.  Strategies for change as well as reviewing the possibilities of relapse were also discussed.  Group expectations and guidelines, as well as confidentiality and its limitations, were addressed. Principles of cognitive behavior therapy related to today's group topic were reviewed and discussed. Group members illustrated understanding of these concepts by giving personal examples based on their current experiences. Discussions stemmed from the group topics to the causal connection between thoughts, feelings, and behaviors.
Patient attended the psychology cognitive-behavior therapy group. The discussion was focused on the topic of Values.  Group expectations, guidelines, confidentiality, and limitations were reviewed.  The group began with a description of values and how it differs from goals. Group members then learned about how to take the necessary steps towards honoring the values one has chosen that gives significance to one's life.  The role of mindfulness and the decisions that assist a person to live in accordance with one's values were explained.  Discussion stemmed from the group's focus on the connection between thoughts, feelings and behaviors.  Group members demonstrated their understanding through active participation, discussion, and by asking clarifying questions.  Members were also provided with a handout describing the concepts and strategies discussed during group.
Patient attended the cognitive behavior therapy group session. Group session focused on the topic of problem solving.  Discussion revolved around the identification of problems, exploring the maximization of benefits while reducing the cost of negative consequences as well as tangible strategies to solving problems.  Group expectations and guidelines, as well as confidentiality and its limitations, were addressed. Principles of cognitive behavior therapy related to today's group topic were reviewed and discussed. Group members illustrated understanding of these concepts by giving personal examples based on their current experiences. Discussions stemmed from the group topics to the causal connection between thoughts, feelings, and behaviors.
Patient attended the psychology cognitive-behavior therapy group. The discussion was focused on the topic of Changing Patterns of Limited Thinking.  Group expectations, guidelines and confidentiality were reviewed.  The group began with an example demonstrating a scenario of limited thinking. Group members then learned about eight patterns of limited thinking and examples of statements portraying such patterns.  Discussion stemmed from the group's focus on the connection between thoughts, feelings and behaviors.  Group members demonstrated their understanding through active participation, discussion, and by asking clarifying questions.  Members were also provided with a handout describing the concepts and strategies discussed during group. 
Patient attended the psychology cognitive-behavior therapy group. The discussion was focused on the topic of Time Management. Group expectations, guidelines, confidentiality, and limitations were reviewed. The group began with a description of central steps for effective time management. Group members then learned about how to take the necessary steps towards action planning and decision making to make choices and effectively achieve desired outcomes. Discussion stemmed from the group's focus on the connection between thoughts, feelings and behaviors. Group members demonstrated their understanding through active participation, discussion, and by asking clarifying questions. Members were also provided with a handout describing the concepts and strategies discussed during group.
Patient attended the psychology cognitive-behavior therapy group. The discussion was focused on the topic of Tolerating Uncertainty. Group expectations, guidelines, confidentiality, and limitations were reviewed. The group began with a description of intolerance of uncertainty and the resulting anxiety. Patterns that maintain the anxiety were discussed. Group members then learned about how to take the necessary steps towards tolerating uncertainty using the APPLE acronym. The steps to acknowledge, pause, pull back, let go, and explore were explained. Group members were encouraged to practice tolerating uncertainty about a worrisome thought about the future. Discussion stemmed from the group's focus on the connection between thoughts, feelings and behaviors. Group members demonstrated their understanding through active participation, discussion, and by asking clarifying questions. Members were also provided with a handout describing the concepts and strategies discussed during group.
Patient attended the psychology cognitive-behavior therapy group. The discussion was focused on the topic of Guilt.  Group expectations, guidelines, confidentiality, and limitations were reviewed.  The group began with the definitions of guilt, remorse, and regret. Thinking errors that contribute to guilt were reviewed. The importance of empathic self-awareness was discussed and group members were encouraged to practice recognizing and reframing rigid self-expectations. Discussion stemmed from the group's focus on the connection between thoughts, feelings, and behaviors.  Group members demonstrated their understanding through active participation, discussion, and by asking clarifying questions.  Members were also provided with a handout describing the concepts and strategies discussed during group. 
Patient attended the psychology cognitive-behavior therapy group. The discussion was focused on the topic of Motivation.  Group expectations, guidelines, and confidentiality were reviewed.  The group began with a description of motivation and its role in changing patterns when adapting to new situations. Group members were then encouraged to identify some problems they would like to address and focus on one important change that could change the problem. Group members then learned about how to take the necessary steps towards assessing their degree of ambivalence and conduct a Cost Benefits Analysis to help reevaluate their willingness to change.  Discussion stemmed from the group's focus on the connection between thoughts, feelings and behaviors.  Group members demonstrated their understanding through active participation, discussion, and by asking clarifying questions.  Members were also provided with a handout describing the concepts and strategies discussed during group. 
Patient attended the psychology cognitive-behavior therapy group. The discussion was focused on the topic of behavioral activation. Group expectations, guidelines, confidentiality and its limitations were reviewed. The group began with a description of behavioral activation as a treatment technique to decrease avoidance and isolation. Group members then learned how to introduce activities gradually into daily schedules. Also discussed were methods to create a behavioral activation schedule to assist in improving mood and activity levels.  Group members demonstrated their understanding through active participation, discussion, and by asking clarifying questions. Discussion stemmed from the group's focus on the connection between thoughts, feelings and behaviors. Group members were provided with a handout describing the concepts and strategies discussed during group.
Patient attended the psychology cognitive-behavior therapy group. The discussion was focused on the topic of loss and acceptance. Group expectations, guidelines, confidentiality and its limitations were reviewed. The group began with a description of Dr. Hu and the five stages of Grief. Group members then learned about the process of Acceptance. Also discussed were methods to facilitate acceptance in practical ways.  Group members demonstrated their understanding through active participation, discussion, and by asking clarifying questions. Discussion stemmed from the group's focus on the connection between thoughts, feelings and behaviors. Group members were provided with a handout describing the concepts and strategies discussed during group.
Patient attended the psychology cognitive-behavior therapy group. The discussion was focused on the topic of Assertive Communication.  Group expectations, guidelines, confidentiality, and limitations were reviewed.  The group began with a description of assertiveness communication. Group members then learned about the importance of nonverbal assertive behaviors such as posture, eye contact, and facial expressions. Group members then reviewed the components and benefits of assertive communication. Discussion stemmed from the group's focus on the connection between thoughts, feelings and behaviors.  Group members demonstrated their understanding through active participation, discussion, and by asking clarifying questions.  Members were also provided with a handout describing the concepts and strategies discussed during group. 
Patient attended the cognitive behavior therapy group session. Group focused on topics including depression. Specifically, group addressed symptoms of depression, causes of depression, and what can be done to combat depression. Group expectations and guidelines, as well as confidentiality and its limitations, were addressed. Principles of cognitive behavior therapy related to today's group topic were reviewed and discussed. Group members illustrated understanding of these concepts by giving personal examples based on their current experiences. Discussions stemmed from the group topics to the causal connection between thoughts, feelings, and behaviors.
Patient attended the psychology cognitive-behavior therapy group. The discussion was focused on the topic of Rational Thinking.  Group expectations, guidelines, confidentiality, and limitations were reviewed.  The group began with a description of how negative self-talk after a situation or experience resulted in a negative emotional response. Negative effects of emotional distress were discussed. Group members were then introduced to strategies to address negative-self talk, such as Thought Stopping, Reframing, and Realistic Self-Talk. Discussion stemmed from the group's focus on the connection between thoughts, feelings and behaviors.  Group members demonstrated their understanding through active participation, discussion, and by asking clarifying questions.  Members were also provided with a handout describing the concepts and strategies discussed during group. 
Patient attended the psychology cognitive-behavior therapy group. The discussion was focused on the topic of the Importance of Sleep. Group expectations, guidelines, confidentiality, and limitations were reviewed.  The group began with a description of the benefits of sleep including muscle repair, memory consolidation, and regulation of growth and appetite. Factors that impact sleep such as caffeine intake, diet, and naps were reviewed. Group members then learned about tips to help improve their quality of sleep. Group members were then encouraged to monitor their sleep patterns and daily moods.  Discussion stemmed from the group's focus on the connection between thoughts, feelings, and behaviors.  Group members demonstrated their understanding through active participation, discussion, and by asking clarifying questions.  Members were also provided with a handout describing the concepts and strategies discussed during group. 
Patient attended the cognitive behavior therapy (CBT) group session.  Group focused on the topic of motivation and learning how to develop the commitment to change their current circumstances.  Patient was encouraged to list difficulties as well as their strengths in dealing with their problems.  Patient was also encouraged to do a cost benefit analysis of changing current situations.  Group expectations and guidelines (as well as confidentiality and its limitations) were addressed.  Principles of cognitive behavior therapy related to today's group topic were reviewed.  Group members illustrated understanding of these concepts by giving personal examples based on their current experiences.  Discussions stemmed from the group topics to the causal connection between thoughts, feelings, and behaviors.
Patient attended the psychology cognitive-behavior therapy group. The discussion was focused on the topic of Distress Tolerance Skills.  Group expectations, guidelines, confidentiality, and limitations were reviewed.  The group began with a description of the importance of tolerating distress and how it differs from finding a cure for problems. Group members then learned about various methods to distract themselves, and self-soothe. Group members were encouraged to plan distracting and self-soothing skills that they would like to try. Discussion stemmed from the group's focus on the connection between thoughts, feelings and behaviors.  Group members demonstrated their understanding through active participation, discussion, and by asking clarifying questions.  Members were also provided with a handout describing the concepts and strategies discussed during group.
Patient attended the cognitive behavior therapy group session. Group focused on the topic of automatic thoughts.  This included how to identify automatic thoughts and learning how to change the thoughts that have a negative impact on daily functioning. Group expectations and guidelines, as well as confidentiality and its limitations, were addressed. Principles of cognitive behavior therapy related to today's group topic were reviewed and discussed. Group members illustrated understanding of these concepts by giving personal examples based on their current experiences. Discussions stemmed from the group topics to the causal connection between thoughts, feelings, and behaviors.
Patient attended the cognitive behavior therapy group session. Group focused on topics including identifying stress, understanding the physical reactions to stress, and learning adaptive coping methods in managing stress. Group expectations and guidelines, as well as confidentiality and its limitations, were addressed. Principles of cognitive behavior therapy related to today's group topic were reviewed and discussed. Group members illustrated understanding of these concepts by giving personal examples based on their current experiences. Discussions stemmed from the group topics to the causal connection between thoughts, feelings, and behaviors.
Patient attended the cognitive behavior therapy group session. Group session focused on the topic of communicating effectively.  Discussion revolved around the different types of communication styles as well as developing strategies to communicate more effectively.  Group expectations and guidelines, as well as confidentiality and its limitations, were addressed. Principles of cognitive behavior therapy related to today's group topic were reviewed and discussed. Group members illustrated understanding of these concepts by giving personal examples based on their current experiences. Discussions stemmed from the group topics to the causal connection between thoughts, feelings, and behaviors.
Patient attended the psychology cognitive-behavior therapy group. The discussion was focused on the topic of Memory Training. Group expectations, guidelines, confidentiality and its limitations were reviewed. The group began with a description of memory difficulties as well as the definitions of attention and concentration. Group members then learned about external and internal strategies to improve both attention and concentration. Also discussed were other factors that impact memory performance (such as nutrition, exercise, and mental stimulation).  Group members demonstrated their understanding through active participation, discussion, and by asking clarifying questions. Discussion stemmed from the group's focus on the connection between thoughts, feelings and behaviors. Group members were provided with a handout describing the concepts and strategies discussed during group.
Patient attended the psychology cognitive-behavior therapy group. The discussion was focused on the topic of Procrastination.  Group expectations, guidelines, confidentiality, and limitations were reviewed.  The group began with a description of factors that negatively influence motivation. The cycle of negative self-statements, self-defeating emotions and behaviors, and negative consequences was reviewed. Group members then learned about mindsets associated with procrastination and self-activation techniques such as activity scheduling to help increase productivity. Discussion stemmed from the group's focus on the connection between thoughts, feelings, and behaviors.  Group members demonstrated their understanding through active participation, discussion, and by asking clarifying questions.  Members were also provided with a handout describing the concepts and strategies discussed during group. 
Patient attended the psychology cognitive-behavior therapy group. The discussion was focused on the topic of Problem Solving.  Group expectations, guidelines, confidentiality, and limitations were reviewed.  The group began with the definition of a problem and a solution. Steps to problem solving were then reviewed, such as, identifying the problem, brainstorming solutions, evaluating the costs and benefits of the solution, making a decision, and evaluating the results. Group members were then encouraged to practice these steps. Discussion stemmed from the group's focus on the connection between thoughts, feelings, and behaviors.  Group members demonstrated their understanding through active participation, discussion, and by asking clarifying questions.  Members were also provided with a handout describing the concepts and strategies discussed during group.

## 2022-12-15 NOTE — BH PSYCHOLOGY - GROUP THERAPY NOTE - TOKEN PULL-DIAGNOSIS
Primary Diagnosis:  Severe manic bipolar 1 disorder with psychotic behavior [F31.2]        Problem Dx:   Back pain [M54.9]      DM2 (diabetes mellitus, type 2) [E11.9]      Bipolar affective disorder, currently manic, severe, with psychotic features [F31.2]      
Primary Diagnosis:  Severe manic bipolar 1 disorder with psychotic behavior [F31.2]        Problem Dx:   Back pain [M54.9]      DM2 (diabetes mellitus, type 2) [E11.9]      Bipolar affective disorder, currently manic, severe, with psychotic features [F31.2]      
Primary Diagnosis:  Severe manic bipolar 1 disorder with psychotic behavior [F31.2]        Problem Dx:   Swelling of lower extremity [M79.89]      Back pain [M54.9]      DM2 (diabetes mellitus, type 2) [E11.9]      Bipolar affective disorder, currently manic, severe, with psychotic features [F31.2]      
Primary Diagnosis:  Severe manic bipolar 1 disorder with psychotic behavior [F31.2]        Problem Dx:   Back pain [M54.9]      DM2 (diabetes mellitus, type 2) [E11.9]      Bipolar affective disorder, currently manic, severe, with psychotic features [F31.2]      
Primary Diagnosis:  Severe manic bipolar 1 disorder with psychotic behavior [F31.2]        Problem Dx:   Swelling of lower extremity [M79.89]      Back pain [M54.9]      DM2 (diabetes mellitus, type 2) [E11.9]      Bipolar affective disorder, currently manic, severe, with psychotic features [F31.2]      
Primary Diagnosis:  Severe manic bipolar 1 disorder with psychotic behavior [F31.2]        Problem Dx:   Back pain [M54.9]      DM2 (diabetes mellitus, type 2) [E11.9]      Bipolar affective disorder, currently manic, severe, with psychotic features [F31.2]      
Primary Diagnosis:  Severe manic bipolar 1 disorder with psychotic behavior [F31.2]        Problem Dx:   Swelling of lower extremity [M79.89]      Back pain [M54.9]      DM2 (diabetes mellitus, type 2) [E11.9]      Bipolar affective disorder, currently manic, severe, with psychotic features [F31.2]      
Primary Diagnosis:  Severe manic bipolar 1 disorder with psychotic behavior [F31.2]        Problem Dx:   Back pain [M54.9]      DM2 (diabetes mellitus, type 2) [E11.9]      Bipolar affective disorder, currently manic, severe, with psychotic features [F31.2]      
Primary Diagnosis:  Severe manic bipolar 1 disorder with psychotic behavior [F31.2]        Problem Dx:   Back pain [M54.9]      DM2 (diabetes mellitus, type 2) [E11.9]      Bipolar affective disorder, currently manic, severe, with psychotic features [F31.2]      
Primary Diagnosis:  Severe manic bipolar 1 disorder with psychotic behavior [F31.2]        Problem Dx:   Swelling of lower extremity [M79.89]      Back pain [M54.9]      DM2 (diabetes mellitus, type 2) [E11.9]      Bipolar affective disorder, currently manic, severe, with psychotic features [F31.2]      
Primary Diagnosis:  Severe manic bipolar 1 disorder with psychotic behavior [F31.2]        Problem Dx:   Swelling of lower extremity [M79.89]      Back pain [M54.9]      DM2 (diabetes mellitus, type 2) [E11.9]      Bipolar affective disorder, currently manic, severe, with psychotic features [F31.2]      
Primary Diagnosis:  Severe manic bipolar 1 disorder with psychotic behavior [F31.2]        Problem Dx:   Back pain [M54.9]      DM2 (diabetes mellitus, type 2) [E11.9]      Bipolar affective disorder, currently manic, severe, with psychotic features [F31.2]

## 2022-12-15 NOTE — BH PSYCHOLOGY - GROUP THERAPY NOTE - NSBHPSYCHOLGRPNAME_PSY_A_CORE
CBT (Cognitive Behavioral Therapy) Group

## 2022-12-16 VITALS — TEMPERATURE: 98 F

## 2022-12-16 PROCEDURE — 99231 SBSQ HOSP IP/OBS SF/LOW 25: CPT

## 2022-12-16 RX ADMIN — POLYETHYLENE GLYCOL 3350 17 GRAM(S): 17 POWDER, FOR SOLUTION ORAL at 08:12

## 2022-12-16 RX ADMIN — LITHIUM CARBONATE 600 MILLIGRAM(S): 300 TABLET, EXTENDED RELEASE ORAL at 08:12

## 2022-12-16 NOTE — BH INPATIENT PSYCHIATRY PROGRESS NOTE - NSBHMSETHTASSOC_PSY_A_CORE
Loose

## 2022-12-16 NOTE — BH INPATIENT PSYCHIATRY PROGRESS NOTE - MSE OPTIONS
Structured MSE
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
## 2022-12-16 NOTE — BH INPATIENT PSYCHIATRY PROGRESS NOTE - NSTXDIABDATEEST_PSY_ALL_CORE
01-Nov-2022
13-Dec-2022
01-Nov-2022
15-Dec-2022
01-Nov-2022
13-Dec-2022
01-Nov-2022
01-Nov-2022
13-Dec-2022

## 2022-12-16 NOTE — BH INPATIENT PSYCHIATRY PROGRESS NOTE - NSICDXBHSECONDARYDX_PSY_ALL_CORE
DM2 (diabetes mellitus, type 2)   E11.9  Back pain   M54.9  

## 2022-12-16 NOTE — BH INPATIENT PSYCHIATRY PROGRESS NOTE - NSTXDIABDATETRGT_PSY_ALL_CORE
20-Dec-2022
29-Nov-2022
08-Nov-2022
13-Dec-2022
13-Dec-2022
15-Nov-2022
29-Nov-2022
13-Dec-2022
22-Nov-2022
08-Nov-2022
15-Nov-2022
13-Dec-2022
15-Nov-2022
29-Nov-2022
29-Nov-2022
15-Nov-2022
08-Nov-2022
15-Nov-2022
08-Nov-2022
29-Nov-2022
22-Nov-2022
22-Nov-2022
20-Dec-2022
13-Dec-2022
08-Nov-2022
08-Nov-2022
13-Dec-2022
15-Nov-2022
15-Nov-2022
22-Dec-2022
08-Nov-2022
20-Dec-2022

## 2022-12-16 NOTE — BH INPATIENT PSYCHIATRY PROGRESS NOTE - NSBHLEGALSTATUSNEW_PSY_ALL_CORE
9.27 (2PC)

## 2022-12-16 NOTE — BH INPATIENT PSYCHIATRY PROGRESS NOTE - NSBHMSEGAIT_PSY_A_CORE
Normal gait / station
Other
Normal gait / station

## 2022-12-16 NOTE — BH INPATIENT PSYCHIATRY DISCHARGE NOTE - OTHER PAST PSYCHIATRIC HISTORY (INCLUDE DETAILS REGARDING ONSET, COURSE OF ILLNESS, INPATIENT/OUTPATIENT TREATMENT)
Per EMR pt has a hx of bipolar affective d/o, artem, depression, anxiety, had 1 prior inpt admission @ [facility] in 2008, pt reports not currently connected to outpatient treatment. Per EMR pt has no hx of SA/SI, no marked hx of AH/VH however presents to ED with delusional paranoid thinking others are trying to harm him, pt has no hx of substance /ETOH abuse, currently complaining of poor concentration, poor self-care, poor sleep, worsening anxiety, pt has no hx of aggression or violent behavior, no legal hx, has a medical hx of DM2, cellulitis & chronic back pain. Pt presents tangential, disorganized telling various stories about his ancestorial family members. Stated he worked at the [employer] for 38 years and also admitted he was homeless for 3 years but made it work.

## 2022-12-16 NOTE — BH INPATIENT PSYCHIATRY PROGRESS NOTE - NSICDXBHPRIMARYDX_PSY_ALL_CORE
Severe manic bipolar 1 disorder with psychotic behavior   F31.2  

## 2022-12-16 NOTE — BH INPATIENT PSYCHIATRY PROGRESS NOTE - GENERAL APPEARANCE
No deformities present

## 2022-12-16 NOTE — [facility] INPATIENT PSYCHIATRY PROGRESS NOTE - PRN MEDS
MEDICATIONS  (PRN):  acetaminophen    Suspension .. 650 milliGRAM(s) Oral every 6 hours PRN Temp greater or equal to 38C (100.4F), Mild Pain (1 - 3), Moderate Pain (4 - 6)  diphenhydrAMINE 50 milliGRAM(s) Oral every 6 hours PRN agitation  diphenhydrAMINE Injectable 50 milliGRAM(s) IntraMuscular once PRN agitation  haloperidol     Tablet 5 milliGRAM(s) Oral every 6 hours PRN agitation  haloperidol    Injectable 5 milliGRAM(s) IntraMuscular once PRN Agitation  ibuprofen  Tablet. 600 milliGRAM(s) Oral every 6 hours PRN Mild Pain (1 - 3), Moderate Pain (4 - 6)  LORazepam     Tablet 2 milliGRAM(s) Oral every 6 hours PRN Agitation  LORazepam   Injectable 2 milliGRAM(s) IntraMuscular Once PRN Agitation  melatonin. 5 milliGRAM(s) Oral at bedtime PRN Insomnia  senna 2 Tablet(s) Oral daily PRN constipation  traZODone 50 milliGRAM(s) Oral at bedtime PRN insomnia  
MEDICATIONS  (PRN):  acetaminophen    Suspension .. 650 milliGRAM(s) Oral every 6 hours PRN Temp greater or equal to 38C (100.4F), Mild Pain (1 - 3), Moderate Pain (4 - 6)  diphenhydrAMINE 50 milliGRAM(s) Oral every 6 hours PRN agitation  diphenhydrAMINE Injectable 50 milliGRAM(s) IntraMuscular once PRN agitation  haloperidol     Tablet 5 milliGRAM(s) Oral every 6 hours PRN agitation  haloperidol    Injectable 5 milliGRAM(s) IntraMuscular once PRN Agitation  ibuprofen  Tablet. 600 milliGRAM(s) Oral every 6 hours PRN Mild Pain (1 - 3), Moderate Pain (4 - 6)  LORazepam     Tablet 2 milliGRAM(s) Oral every 6 hours PRN Agitation  LORazepam   Injectable 2 milliGRAM(s) IntraMuscular Once PRN Agitation  melatonin. 5 milliGRAM(s) Oral at bedtime PRN Insomnia  senna 2 Tablet(s) Oral daily PRN constipation  traZODone 50 milliGRAM(s) Oral at bedtime PRN insomnia  
MEDICATIONS  (PRN):  acetaminophen    Suspension .. 650 milliGRAM(s) Oral every 6 hours PRN Temp greater or equal to 38C (100.4F), Mild Pain (1 - 3), Moderate Pain (4 - 6)  diphenhydrAMINE 50 milliGRAM(s) Oral every 6 hours PRN agitation  diphenhydrAMINE Injectable 50 milliGRAM(s) IntraMuscular once PRN agitation  haloperidol     Tablet 5 milliGRAM(s) Oral every 6 hours PRN agitation  haloperidol    Injectable 5 milliGRAM(s) IntraMuscular once PRN Agitation  ibuprofen  Tablet. 600 milliGRAM(s) Oral every 6 hours PRN Mild Pain (1 - 3), Moderate Pain (4 - 6)  LORazepam     Tablet 2 milliGRAM(s) Oral every 6 hours PRN Agitation  LORazepam   Injectable 2 milliGRAM(s) IntraMuscular Once PRN Agitation  melatonin. 5 milliGRAM(s) Oral at bedtime PRN Insomnia  
MEDICATIONS  (PRN):  acetaminophen    Suspension .. 650 milliGRAM(s) Oral every 6 hours PRN Temp greater or equal to 38C (100.4F), Mild Pain (1 - 3), Moderate Pain (4 - 6)  diphenhydrAMINE 50 milliGRAM(s) Oral every 6 hours PRN agitation  diphenhydrAMINE Injectable 50 milliGRAM(s) IntraMuscular once PRN agitation  haloperidol     Tablet 5 milliGRAM(s) Oral every 6 hours PRN agitation  haloperidol    Injectable 5 milliGRAM(s) IntraMuscular once PRN Agitation  ibuprofen  Tablet. 600 milliGRAM(s) Oral every 6 hours PRN Mild Pain (1 - 3), Moderate Pain (4 - 6)  LORazepam     Tablet 2 milliGRAM(s) Oral every 6 hours PRN Agitation  LORazepam   Injectable 2 milliGRAM(s) IntraMuscular Once PRN Agitation  melatonin. 5 milliGRAM(s) Oral at bedtime PRN Insomnia  senna 2 Tablet(s) Oral daily PRN constipation  traZODone 50 milliGRAM(s) Oral at bedtime PRN insomnia  
MEDICATIONS  (PRN):  acetaminophen    Suspension .. 650 milliGRAM(s) Oral every 6 hours PRN Temp greater or equal to 38C (100.4F), Mild Pain (1 - 3), Moderate Pain (4 - 6)  diphenhydrAMINE 50 milliGRAM(s) Oral every 6 hours PRN agitation  diphenhydrAMINE Injectable 50 milliGRAM(s) IntraMuscular once PRN agitation  haloperidol     Tablet 5 milliGRAM(s) Oral every 6 hours PRN agitation  haloperidol    Injectable 5 milliGRAM(s) IntraMuscular once PRN Agitation  ibuprofen  Tablet. 600 milliGRAM(s) Oral every 6 hours PRN Mild Pain (1 - 3), Moderate Pain (4 - 6)  LORazepam     Tablet 2 milliGRAM(s) Oral every 6 hours PRN Agitation  LORazepam   Injectable 2 milliGRAM(s) IntraMuscular Once PRN Agitation  melatonin. 5 milliGRAM(s) Oral at bedtime PRN Insomnia  senna 2 Tablet(s) Oral daily PRN constipation  traZODone 50 milliGRAM(s) Oral at bedtime PRN insomnia  
MEDICATIONS  (PRN):  acetaminophen    Suspension .. 650 milliGRAM(s) Oral every 6 hours PRN Temp greater or equal to 38C (100.4F), Mild Pain (1 - 3), Moderate Pain (4 - 6)  diphenhydrAMINE 50 milliGRAM(s) Oral every 6 hours PRN agitation  diphenhydrAMINE Injectable 50 milliGRAM(s) IntraMuscular once PRN agitation  haloperidol     Tablet 5 milliGRAM(s) Oral every 6 hours PRN agitation  haloperidol    Injectable 5 milliGRAM(s) IntraMuscular once PRN Agitation  ibuprofen  Tablet. 600 milliGRAM(s) Oral every 6 hours PRN Mild Pain (1 - 3), Moderate Pain (4 - 6)  LORazepam     Tablet 2 milliGRAM(s) Oral every 6 hours PRN Agitation  LORazepam   Injectable 2 milliGRAM(s) IntraMuscular Once PRN Agitation  melatonin. 5 milliGRAM(s) Oral at bedtime PRN Insomnia  
MEDICATIONS  (PRN):  acetaminophen    Suspension .. 650 milliGRAM(s) Oral every 6 hours PRN Temp greater or equal to 38C (100.4F), Mild Pain (1 - 3), Moderate Pain (4 - 6)  diphenhydrAMINE 50 milliGRAM(s) Oral every 6 hours PRN agitation  diphenhydrAMINE Injectable 50 milliGRAM(s) IntraMuscular once PRN agitation  haloperidol     Tablet 5 milliGRAM(s) Oral every 6 hours PRN agitation  haloperidol    Injectable 5 milliGRAM(s) IntraMuscular once PRN Agitation  ibuprofen  Tablet. 600 milliGRAM(s) Oral every 6 hours PRN Mild Pain (1 - 3), Moderate Pain (4 - 6)  LORazepam     Tablet 2 milliGRAM(s) Oral every 6 hours PRN Agitation  LORazepam   Injectable 2 milliGRAM(s) IntraMuscular Once PRN Agitation  melatonin. 5 milliGRAM(s) Oral at bedtime PRN Insomnia  senna 2 Tablet(s) Oral daily PRN constipation  traZODone 50 milliGRAM(s) Oral at bedtime PRN insomnia  
MEDICATIONS  (PRN):  acetaminophen     Tablet .. 650 milliGRAM(s) Oral every 6 hours PRN Mild Pain (1 - 3), Moderate Pain (4 - 6)  diphenhydrAMINE 50 milliGRAM(s) Oral every 6 hours PRN agitation  diphenhydrAMINE Injectable 50 milliGRAM(s) IntraMuscular once PRN agitation  haloperidol     Tablet 5 milliGRAM(s) Oral every 6 hours PRN agitation  haloperidol    Injectable 5 milliGRAM(s) IntraMuscular once PRN Agitation  LORazepam     Tablet 2 milliGRAM(s) Oral every 6 hours PRN Agitation  LORazepam   Injectable 2 milliGRAM(s) IntraMuscular Once PRN Agitation  melatonin. 5 milliGRAM(s) Oral at bedtime PRN Insomnia  
MEDICATIONS  (PRN):  acetaminophen    Suspension .. 650 milliGRAM(s) Oral every 6 hours PRN Temp greater or equal to 38C (100.4F), Mild Pain (1 - 3), Moderate Pain (4 - 6)  diphenhydrAMINE 50 milliGRAM(s) Oral every 6 hours PRN agitation  diphenhydrAMINE Injectable 50 milliGRAM(s) IntraMuscular once PRN agitation  haloperidol     Tablet 5 milliGRAM(s) Oral every 6 hours PRN agitation  haloperidol    Injectable 5 milliGRAM(s) IntraMuscular once PRN Agitation  ibuprofen  Tablet. 600 milliGRAM(s) Oral every 6 hours PRN Mild Pain (1 - 3), Moderate Pain (4 - 6)  LORazepam     Tablet 2 milliGRAM(s) Oral every 6 hours PRN Agitation  LORazepam   Injectable 2 milliGRAM(s) IntraMuscular Once PRN Agitation  melatonin. 5 milliGRAM(s) Oral at bedtime PRN Insomnia  
MEDICATIONS  (PRN):  acetaminophen    Suspension .. 650 milliGRAM(s) Oral every 6 hours PRN Temp greater or equal to 38C (100.4F), Mild Pain (1 - 3), Moderate Pain (4 - 6)  diphenhydrAMINE 50 milliGRAM(s) Oral every 6 hours PRN agitation  diphenhydrAMINE Injectable 50 milliGRAM(s) IntraMuscular once PRN agitation  haloperidol     Tablet 5 milliGRAM(s) Oral every 6 hours PRN agitation  haloperidol    Injectable 5 milliGRAM(s) IntraMuscular once PRN Agitation  ibuprofen  Tablet. 600 milliGRAM(s) Oral every 6 hours PRN Mild Pain (1 - 3), Moderate Pain (4 - 6)  LORazepam     Tablet 2 milliGRAM(s) Oral every 6 hours PRN Agitation  LORazepam   Injectable 2 milliGRAM(s) IntraMuscular Once PRN Agitation  melatonin. 5 milliGRAM(s) Oral at bedtime PRN Insomnia  
MEDICATIONS  (PRN):  acetaminophen     Tablet .. 650 milliGRAM(s) Oral every 6 hours PRN Mild Pain (1 - 3), Moderate Pain (4 - 6)  diphenhydrAMINE 50 milliGRAM(s) Oral every 6 hours PRN agitation  diphenhydrAMINE Injectable 50 milliGRAM(s) IntraMuscular once PRN agitation  haloperidol     Tablet 5 milliGRAM(s) Oral every 6 hours PRN agitation  haloperidol    Injectable 5 milliGRAM(s) IntraMuscular once PRN Agitation  LORazepam     Tablet 2 milliGRAM(s) Oral every 6 hours PRN Agitation  LORazepam   Injectable 2 milliGRAM(s) IntraMuscular Once PRN Agitation  melatonin. 5 milliGRAM(s) Oral at bedtime PRN Insomnia  
MEDICATIONS  (PRN):  acetaminophen    Suspension .. 650 milliGRAM(s) Oral every 6 hours PRN Temp greater or equal to 38C (100.4F), Mild Pain (1 - 3), Moderate Pain (4 - 6)  diphenhydrAMINE 50 milliGRAM(s) Oral every 6 hours PRN agitation  diphenhydrAMINE Injectable 50 milliGRAM(s) IntraMuscular once PRN agitation  haloperidol     Tablet 5 milliGRAM(s) Oral every 6 hours PRN agitation  haloperidol    Injectable 5 milliGRAM(s) IntraMuscular once PRN Agitation  ibuprofen  Tablet. 600 milliGRAM(s) Oral every 6 hours PRN Mild Pain (1 - 3), Moderate Pain (4 - 6)  LORazepam     Tablet 2 milliGRAM(s) Oral every 6 hours PRN Agitation  LORazepam   Injectable 2 milliGRAM(s) IntraMuscular Once PRN Agitation  melatonin. 5 milliGRAM(s) Oral at bedtime PRN Insomnia  
MEDICATIONS  (PRN):  acetaminophen    Suspension .. 650 milliGRAM(s) Oral every 6 hours PRN Temp greater or equal to 38C (100.4F), Mild Pain (1 - 3), Moderate Pain (4 - 6)  diphenhydrAMINE 50 milliGRAM(s) Oral every 6 hours PRN agitation  diphenhydrAMINE Injectable 50 milliGRAM(s) IntraMuscular once PRN agitation  haloperidol     Tablet 5 milliGRAM(s) Oral every 6 hours PRN agitation  haloperidol    Injectable 5 milliGRAM(s) IntraMuscular once PRN Agitation  ibuprofen  Tablet. 600 milliGRAM(s) Oral every 6 hours PRN Mild Pain (1 - 3), Moderate Pain (4 - 6)  LORazepam     Tablet 2 milliGRAM(s) Oral every 6 hours PRN Agitation  LORazepam   Injectable 2 milliGRAM(s) IntraMuscular Once PRN Agitation  melatonin. 5 milliGRAM(s) Oral at bedtime PRN Insomnia  
MEDICATIONS  (PRN):  acetaminophen     Tablet .. 650 milliGRAM(s) Oral every 6 hours PRN Mild Pain (1 - 3), Moderate Pain (4 - 6)  diphenhydrAMINE 50 milliGRAM(s) Oral every 6 hours PRN agitation  diphenhydrAMINE Injectable 50 milliGRAM(s) IntraMuscular once PRN agitation  haloperidol     Tablet 5 milliGRAM(s) Oral every 6 hours PRN agitation  haloperidol    Injectable 5 milliGRAM(s) IntraMuscular once PRN Agitation  LORazepam     Tablet 2 milliGRAM(s) Oral every 6 hours PRN Agitation  LORazepam   Injectable 2 milliGRAM(s) IntraMuscular Once PRN Agitation  
MEDICATIONS  (PRN):  acetaminophen    Suspension .. 650 milliGRAM(s) Oral every 6 hours PRN Temp greater or equal to 38C (100.4F), Mild Pain (1 - 3), Moderate Pain (4 - 6)  diphenhydrAMINE 50 milliGRAM(s) Oral every 6 hours PRN agitation  diphenhydrAMINE Injectable 50 milliGRAM(s) IntraMuscular once PRN agitation  haloperidol     Tablet 5 milliGRAM(s) Oral every 6 hours PRN agitation  haloperidol    Injectable 5 milliGRAM(s) IntraMuscular once PRN Agitation  ibuprofen  Tablet. 600 milliGRAM(s) Oral every 6 hours PRN Mild Pain (1 - 3), Moderate Pain (4 - 6)  LORazepam     Tablet 2 milliGRAM(s) Oral every 6 hours PRN Agitation  LORazepam   Injectable 2 milliGRAM(s) IntraMuscular Once PRN Agitation  melatonin. 5 milliGRAM(s) Oral at bedtime PRN Insomnia  senna 2 Tablet(s) Oral daily PRN constipation  traZODone 50 milliGRAM(s) Oral at bedtime PRN insomnia  
MEDICATIONS  (PRN):  acetaminophen     Tablet .. 650 milliGRAM(s) Oral every 6 hours PRN Mild Pain (1 - 3), Moderate Pain (4 - 6)  diphenhydrAMINE 50 milliGRAM(s) Oral every 6 hours PRN agitation  diphenhydrAMINE Injectable 50 milliGRAM(s) IntraMuscular once PRN agitation  haloperidol     Tablet 5 milliGRAM(s) Oral every 6 hours PRN agitation  haloperidol    Injectable 5 milliGRAM(s) IntraMuscular once PRN Agitation  LORazepam     Tablet 2 milliGRAM(s) Oral every 6 hours PRN Agitation  LORazepam   Injectable 2 milliGRAM(s) IntraMuscular Once PRN Agitation  
MEDICATIONS  (PRN):  acetaminophen     Tablet .. 650 milliGRAM(s) Oral every 6 hours PRN Mild Pain (1 - 3), Moderate Pain (4 - 6)  diphenhydrAMINE 50 milliGRAM(s) Oral every 6 hours PRN agitation  diphenhydrAMINE Injectable 50 milliGRAM(s) IntraMuscular once PRN agitation  haloperidol     Tablet 5 milliGRAM(s) Oral every 6 hours PRN agitation  haloperidol    Injectable 5 milliGRAM(s) IntraMuscular once PRN Agitation  LORazepam     Tablet 2 milliGRAM(s) Oral every 6 hours PRN Agitation  LORazepam   Injectable 2 milliGRAM(s) IntraMuscular Once PRN Agitation  melatonin. 5 milliGRAM(s) Oral at bedtime PRN Insomnia  
MEDICATIONS  (PRN):  acetaminophen     Tablet .. 650 milliGRAM(s) Oral every 6 hours PRN Mild Pain (1 - 3), Moderate Pain (4 - 6)  diphenhydrAMINE 50 milliGRAM(s) Oral every 6 hours PRN agitation  diphenhydrAMINE Injectable 50 milliGRAM(s) IntraMuscular once PRN agitation  haloperidol     Tablet 5 milliGRAM(s) Oral every 6 hours PRN agitation  haloperidol    Injectable 5 milliGRAM(s) IntraMuscular once PRN Agitation  LORazepam     Tablet 2 milliGRAM(s) Oral every 6 hours PRN Agitation  LORazepam   Injectable 2 milliGRAM(s) IntraMuscular Once PRN Agitation  melatonin. 5 milliGRAM(s) Oral at bedtime PRN Insomnia  
MEDICATIONS  (PRN):  acetaminophen     Tablet .. 650 milliGRAM(s) Oral every 6 hours PRN Mild Pain (1 - 3), Moderate Pain (4 - 6)  diphenhydrAMINE 50 milliGRAM(s) Oral every 6 hours PRN agitation  diphenhydrAMINE Injectable 50 milliGRAM(s) IntraMuscular once PRN agitation  haloperidol     Tablet 5 milliGRAM(s) Oral every 6 hours PRN agitation  haloperidol    Injectable 5 milliGRAM(s) IntraMuscular once PRN Agitation  LORazepam     Tablet 2 milliGRAM(s) Oral every 6 hours PRN Agitation  LORazepam   Injectable 2 milliGRAM(s) IntraMuscular Once PRN Agitation  
MEDICATIONS  (PRN):  acetaminophen     Tablet .. 650 milliGRAM(s) Oral every 6 hours PRN Mild Pain (1 - 3), Moderate Pain (4 - 6)  diphenhydrAMINE 50 milliGRAM(s) Oral every 6 hours PRN agitation  diphenhydrAMINE Injectable 50 milliGRAM(s) IntraMuscular once PRN agitation  haloperidol     Tablet 5 milliGRAM(s) Oral every 6 hours PRN agitation  haloperidol    Injectable 5 milliGRAM(s) IntraMuscular once PRN Agitation  LORazepam     Tablet 2 milliGRAM(s) Oral every 6 hours PRN Agitation  LORazepam   Injectable 2 milliGRAM(s) IntraMuscular Once PRN Agitation  melatonin. 5 milliGRAM(s) Oral at bedtime PRN Insomnia  
MEDICATIONS  (PRN):  acetaminophen    Suspension .. 650 milliGRAM(s) Oral every 6 hours PRN Temp greater or equal to 38C (100.4F), Mild Pain (1 - 3), Moderate Pain (4 - 6)  diphenhydrAMINE 50 milliGRAM(s) Oral every 6 hours PRN agitation  diphenhydrAMINE Injectable 50 milliGRAM(s) IntraMuscular once PRN agitation  haloperidol     Tablet 5 milliGRAM(s) Oral every 6 hours PRN agitation  haloperidol    Injectable 5 milliGRAM(s) IntraMuscular once PRN Agitation  ibuprofen  Tablet. 600 milliGRAM(s) Oral every 6 hours PRN Mild Pain (1 - 3), Moderate Pain (4 - 6)  LORazepam     Tablet 2 milliGRAM(s) Oral every 6 hours PRN Agitation  LORazepam   Injectable 2 milliGRAM(s) IntraMuscular Once PRN Agitation  melatonin. 5 milliGRAM(s) Oral at bedtime PRN Insomnia  senna 2 Tablet(s) Oral daily PRN constipation  traZODone 50 milliGRAM(s) Oral at bedtime PRN insomnia  
MEDICATIONS  (PRN):  acetaminophen     Tablet .. 650 milliGRAM(s) Oral every 6 hours PRN Mild Pain (1 - 3), Moderate Pain (4 - 6)  diphenhydrAMINE 50 milliGRAM(s) Oral every 6 hours PRN agitation  diphenhydrAMINE Injectable 50 milliGRAM(s) IntraMuscular once PRN agitation  haloperidol     Tablet 5 milliGRAM(s) Oral every 6 hours PRN agitation  haloperidol    Injectable 5 milliGRAM(s) IntraMuscular once PRN Agitation  LORazepam     Tablet 2 milliGRAM(s) Oral every 6 hours PRN Agitation  LORazepam   Injectable 2 milliGRAM(s) IntraMuscular Once PRN Agitation  melatonin. 5 milliGRAM(s) Oral at bedtime PRN Insomnia  
MEDICATIONS  (PRN):  acetaminophen    Suspension .. 650 milliGRAM(s) Oral every 6 hours PRN Temp greater or equal to 38C (100.4F), Mild Pain (1 - 3), Moderate Pain (4 - 6)  diphenhydrAMINE 50 milliGRAM(s) Oral every 6 hours PRN agitation  diphenhydrAMINE Injectable 50 milliGRAM(s) IntraMuscular once PRN agitation  haloperidol     Tablet 5 milliGRAM(s) Oral every 6 hours PRN agitation  haloperidol    Injectable 5 milliGRAM(s) IntraMuscular once PRN Agitation  ibuprofen  Tablet. 600 milliGRAM(s) Oral every 6 hours PRN Mild Pain (1 - 3), Moderate Pain (4 - 6)  LORazepam     Tablet 2 milliGRAM(s) Oral every 6 hours PRN Agitation  LORazepam   Injectable 2 milliGRAM(s) IntraMuscular Once PRN Agitation  melatonin. 5 milliGRAM(s) Oral at bedtime PRN Insomnia  
MEDICATIONS  (PRN):  acetaminophen     Tablet .. 650 milliGRAM(s) Oral every 6 hours PRN Mild Pain (1 - 3), Moderate Pain (4 - 6)  diphenhydrAMINE 50 milliGRAM(s) Oral every 6 hours PRN agitation  diphenhydrAMINE Injectable 50 milliGRAM(s) IntraMuscular once PRN agitation  haloperidol     Tablet 5 milliGRAM(s) Oral every 6 hours PRN agitation  haloperidol    Injectable 5 milliGRAM(s) IntraMuscular once PRN Agitation  LORazepam     Tablet 2 milliGRAM(s) Oral every 6 hours PRN Agitation  LORazepam   Injectable 2 milliGRAM(s) IntraMuscular Once PRN Agitation  melatonin. 5 milliGRAM(s) Oral at bedtime PRN Insomnia  
MEDICATIONS  (PRN):  acetaminophen    Suspension .. 650 milliGRAM(s) Oral every 6 hours PRN Temp greater or equal to 38C (100.4F), Mild Pain (1 - 3), Moderate Pain (4 - 6)  diphenhydrAMINE 50 milliGRAM(s) Oral every 6 hours PRN agitation  diphenhydrAMINE Injectable 50 milliGRAM(s) IntraMuscular once PRN agitation  haloperidol     Tablet 5 milliGRAM(s) Oral every 6 hours PRN agitation  haloperidol    Injectable 5 milliGRAM(s) IntraMuscular once PRN Agitation  ibuprofen  Tablet. 600 milliGRAM(s) Oral every 6 hours PRN Mild Pain (1 - 3), Moderate Pain (4 - 6)  LORazepam     Tablet 2 milliGRAM(s) Oral every 6 hours PRN Agitation  LORazepam   Injectable 2 milliGRAM(s) IntraMuscular Once PRN Agitation  melatonin. 5 milliGRAM(s) Oral at bedtime PRN Insomnia  
MEDICATIONS  (PRN):  acetaminophen     Tablet .. 650 milliGRAM(s) Oral every 6 hours PRN Mild Pain (1 - 3), Moderate Pain (4 - 6)  diphenhydrAMINE 50 milliGRAM(s) Oral every 6 hours PRN agitation  diphenhydrAMINE Injectable 50 milliGRAM(s) IntraMuscular once PRN agitation  haloperidol     Tablet 5 milliGRAM(s) Oral every 6 hours PRN agitation  haloperidol    Injectable 5 milliGRAM(s) IntraMuscular once PRN Agitation  LORazepam     Tablet 2 milliGRAM(s) Oral every 6 hours PRN Agitation  LORazepam   Injectable 2 milliGRAM(s) IntraMuscular Once PRN Agitation  
MEDICATIONS  (PRN):  acetaminophen    Suspension .. 650 milliGRAM(s) Oral every 6 hours PRN Temp greater or equal to 38C (100.4F), Mild Pain (1 - 3), Moderate Pain (4 - 6)  diphenhydrAMINE 50 milliGRAM(s) Oral every 6 hours PRN agitation  diphenhydrAMINE Injectable 50 milliGRAM(s) IntraMuscular once PRN agitation  haloperidol     Tablet 5 milliGRAM(s) Oral every 6 hours PRN agitation  haloperidol    Injectable 5 milliGRAM(s) IntraMuscular once PRN Agitation  ibuprofen  Tablet. 600 milliGRAM(s) Oral every 6 hours PRN Mild Pain (1 - 3), Moderate Pain (4 - 6)  LORazepam     Tablet 2 milliGRAM(s) Oral every 6 hours PRN Agitation  LORazepam   Injectable 2 milliGRAM(s) IntraMuscular Once PRN Agitation  melatonin. 5 milliGRAM(s) Oral at bedtime PRN Insomnia  senna 2 Tablet(s) Oral daily PRN constipation  traZODone 50 milliGRAM(s) Oral at bedtime PRN insomnia  
MEDICATIONS  (PRN):  acetaminophen    Suspension .. 650 milliGRAM(s) Oral every 6 hours PRN Temp greater or equal to 38C (100.4F), Mild Pain (1 - 3), Moderate Pain (4 - 6)  diphenhydrAMINE 50 milliGRAM(s) Oral every 6 hours PRN agitation  diphenhydrAMINE Injectable 50 milliGRAM(s) IntraMuscular once PRN agitation  haloperidol     Tablet 5 milliGRAM(s) Oral every 6 hours PRN agitation  haloperidol    Injectable 5 milliGRAM(s) IntraMuscular once PRN Agitation  ibuprofen  Tablet. 600 milliGRAM(s) Oral every 6 hours PRN Mild Pain (1 - 3), Moderate Pain (4 - 6)  LORazepam     Tablet 2 milliGRAM(s) Oral every 6 hours PRN Agitation  LORazepam   Injectable 2 milliGRAM(s) IntraMuscular Once PRN Agitation  melatonin. 5 milliGRAM(s) Oral at bedtime PRN Insomnia  
MEDICATIONS  (PRN):  acetaminophen    Suspension .. 650 milliGRAM(s) Oral every 6 hours PRN Temp greater or equal to 38C (100.4F), Mild Pain (1 - 3), Moderate Pain (4 - 6)  diphenhydrAMINE 50 milliGRAM(s) Oral every 6 hours PRN agitation  diphenhydrAMINE Injectable 50 milliGRAM(s) IntraMuscular once PRN agitation  haloperidol     Tablet 5 milliGRAM(s) Oral every 6 hours PRN agitation  haloperidol    Injectable 5 milliGRAM(s) IntraMuscular once PRN Agitation  ibuprofen  Tablet. 600 milliGRAM(s) Oral every 6 hours PRN Mild Pain (1 - 3), Moderate Pain (4 - 6)  LORazepam     Tablet 2 milliGRAM(s) Oral every 6 hours PRN Agitation  LORazepam   Injectable 2 milliGRAM(s) IntraMuscular Once PRN Agitation  melatonin. 5 milliGRAM(s) Oral at bedtime PRN Insomnia  senna 2 Tablet(s) Oral daily PRN constipation  traZODone 50 milliGRAM(s) Oral at bedtime PRN insomnia  
MEDICATIONS  (PRN):  acetaminophen     Tablet .. 650 milliGRAM(s) Oral every 6 hours PRN Mild Pain (1 - 3), Moderate Pain (4 - 6)  diphenhydrAMINE 50 milliGRAM(s) Oral every 6 hours PRN agitation  diphenhydrAMINE Injectable 50 milliGRAM(s) IntraMuscular once PRN agitation  haloperidol     Tablet 5 milliGRAM(s) Oral every 6 hours PRN agitation  haloperidol    Injectable 5 milliGRAM(s) IntraMuscular once PRN Agitation  LORazepam     Tablet 2 milliGRAM(s) Oral every 6 hours PRN Agitation  LORazepam   Injectable 2 milliGRAM(s) IntraMuscular Once PRN Agitation  melatonin. 5 milliGRAM(s) Oral at bedtime PRN Insomnia  
MEDICATIONS  (PRN):  acetaminophen    Suspension .. 650 milliGRAM(s) Oral every 6 hours PRN Temp greater or equal to 38C (100.4F), Mild Pain (1 - 3), Moderate Pain (4 - 6)  diphenhydrAMINE 50 milliGRAM(s) Oral every 6 hours PRN agitation  diphenhydrAMINE Injectable 50 milliGRAM(s) IntraMuscular once PRN agitation  haloperidol     Tablet 5 milliGRAM(s) Oral every 6 hours PRN agitation  haloperidol    Injectable 5 milliGRAM(s) IntraMuscular once PRN Agitation  ibuprofen  Tablet. 600 milliGRAM(s) Oral every 6 hours PRN Mild Pain (1 - 3), Moderate Pain (4 - 6)  LORazepam     Tablet 2 milliGRAM(s) Oral every 6 hours PRN Agitation  LORazepam   Injectable 2 milliGRAM(s) IntraMuscular Once PRN Agitation  melatonin. 5 milliGRAM(s) Oral at bedtime PRN Insomnia  
MEDICATIONS  (PRN):  acetaminophen    Suspension .. 650 milliGRAM(s) Oral every 6 hours PRN Temp greater or equal to 38C (100.4F), Mild Pain (1 - 3), Moderate Pain (4 - 6)  diphenhydrAMINE 50 milliGRAM(s) Oral every 6 hours PRN agitation  diphenhydrAMINE Injectable 50 milliGRAM(s) IntraMuscular once PRN agitation  haloperidol     Tablet 5 milliGRAM(s) Oral every 6 hours PRN agitation  haloperidol    Injectable 5 milliGRAM(s) IntraMuscular once PRN Agitation  ibuprofen  Tablet. 600 milliGRAM(s) Oral every 6 hours PRN Mild Pain (1 - 3), Moderate Pain (4 - 6)  LORazepam     Tablet 2 milliGRAM(s) Oral every 6 hours PRN Agitation  LORazepam   Injectable 2 milliGRAM(s) IntraMuscular Once PRN Agitation  melatonin. 5 milliGRAM(s) Oral at bedtime PRN Insomnia  senna 2 Tablet(s) Oral daily PRN constipation  traZODone 50 milliGRAM(s) Oral at bedtime PRN insomnia

## 2022-12-16 NOTE — BH INPATIENT PSYCHIATRY PROGRESS NOTE - NSBHCHARTREVIEWVS_PSY_A_CORE FT
Vital Signs Last 24 Hrs  T(C): 36.6 (11-05-22 @ 06:39), Max: 36.6 (11-05-22 @ 06:39)  T(F): 97.8 (11-05-22 @ 06:39), Max: 97.8 (11-05-22 @ 06:39)  HR: --  BP: --  BP(mean): --  RR: --  SpO2: --    Orthostatic VS  11-05-22 @ 06:39  Lying BP: --/-- HR: --  Sitting BP: 130/97 HR: 108  Standing BP: 128/100 HR: 106  Site: --  Mode: --  Orthostatic VS  11-04-22 @ 07:47  Lying BP: --/-- HR: --  Sitting BP: 120/91 HR: 103  Standing BP: 119/72 HR: 107  Site: --  Mode: --  Orthostatic VS  11-03-22 @ 19:27  Lying BP: --/-- HR: --  Sitting BP: 131/79 HR: 101  Standing BP: 133/77 HR: 102  Site: --  Mode: --  
Vital Signs Last 24 Hrs  T(C): 36.8 (11-23-22 @ 07:08), Max: 36.8 (11-23-22 @ 07:08)  T(F): 98.2 (11-23-22 @ 07:08), Max: 98.2 (11-23-22 @ 07:08)  HR: --  BP: --  BP(mean): --  RR: --  SpO2: --    Orthostatic VS  11-23-22 @ 07:08  Lying BP: --/-- HR: --  Sitting BP: 119/82 HR: 104  Standing BP: --/-- HR: --  Site: --  Mode: --  Orthostatic VS  11-22-22 @ 06:49  Lying BP: --/-- HR: --  Sitting BP: 124/72 HR: 102  Standing BP: --/-- HR: --  Site: upper left arm  Mode: electronic  
Vital Signs Last 24 Hrs  T(C): 36.4 (11-21-22 @ 05:34), Max: 36.4 (11-21-22 @ 05:34)  T(F): 97.6 (11-21-22 @ 05:34), Max: 97.6 (11-21-22 @ 05:34)    Orthostatic VS  11-21-22 @ 05:34  Lying BP: --/-- HR: --  Sitting BP: 103/63 HR: 101  Standing BP: --/-- HR: --  Site: --  Mode: --  Orthostatic VS  11-20-22 @ 06:14  Lying BP: --/-- HR: --  Sitting BP: 141/86 HR: 90  Standing BP: --/-- HR: --  Site: --  Mode: --  
Vital Signs Last 24 Hrs  T(C): 36.6 (11-11-22 @ 06:39), Max: 36.6 (11-11-22 @ 06:39)  T(F): 97.9 (11-11-22 @ 06:39), Max: 97.9 (11-11-22 @ 06:39)  HR: --  BP: --  BP(mean): --  RR: --  SpO2: --    Orthostatic VS  11-11-22 @ 06:39  Lying BP: --/-- HR: --  Sitting BP: 136/80 HR: 105  Standing BP: --/-- HR: --  Site: --  Mode: --  Orthostatic VS  11-10-22 @ 06:53  Lying BP: --/-- HR: --  Sitting BP: 138/81 HR: 101  Standing BP: --/-- HR: --  Site: upper left arm  Mode: electronic  
Vital Signs Last 24 Hrs  T(C): 36.6 (11-16-22 @ 06:45), Max: 36.6 (11-16-22 @ 06:45)  T(F): 97.9 (11-16-22 @ 06:45), Max: 97.9 (11-16-22 @ 06:45)  HR: --  BP: --  BP(mean): --  RR: --  SpO2: --    Orthostatic VS  11-16-22 @ 06:45  Lying BP: --/-- HR: --  Sitting BP: 146/85 HR: 92  Standing BP: 138/86 HR: 93  Site: --  Mode: --  Orthostatic VS  11-15-22 @ 06:53  Lying BP: --/-- HR: --  Sitting BP: 121/77 HR: 102  Standing BP: --/-- HR: --  Site: --  Mode: --  
Vital Signs Last 24 Hrs  T(C): 36.4 (12-05-22 @ 06:54), Max: 36.4 (12-05-22 @ 06:54)  T(F): 97.6 (12-05-22 @ 06:54), Max: 97.6 (12-05-22 @ 06:54)  HR: --  BP: --  BP(mean): --  RR: --  SpO2: --    Orthostatic VS  12-05-22 @ 06:54  Lying BP: --/-- HR: --  Sitting BP: 104/66 HR: 85  Standing BP: 98/64 HR: 90  Site: --  Mode: --  Orthostatic VS  12-04-22 @ 04:50  Lying BP: --/-- HR: --  Sitting BP: 136/83 HR: 98  Standing BP: 123/76 HR: 87  Site: --  Mode: --  
Vital Signs Last 24 Hrs  T(C): 36.7 (12-16-22 @ 06:39), Max: 36.7 (12-16-22 @ 06:39)  T(F): 98 (12-16-22 @ 06:39), Max: 98 (12-16-22 @ 06:39)      Orthostatic VS  12-16-22 @ 06:39  Lying BP: --/-- HR: --  Sitting BP: 130/78 HR: 88  Standing BP: --/-- HR: --  Site: upper left arm  Mode: electronic  Orthostatic VS  12-15-22 @ 06:35  Lying BP: --/-- HR: --  Sitting BP: 109/75 HR: 85  Standing BP: --/-- HR: --  Site: upper left arm  Mode: electronic  
Vital Signs Last 24 Hrs  T(C): 36.8 (11-17-22 @ 06:54), Max: 36.8 (11-17-22 @ 06:54)  T(F): 98.2 (11-17-22 @ 06:54), Max: 98.2 (11-17-22 @ 06:54)  HR: --  BP: --  BP(mean): --  RR: --  SpO2: --    Orthostatic VS  11-17-22 @ 06:54  Lying BP: --/-- HR: --  Sitting BP: 104/64 HR: 104  Standing BP: 106/68 HR: 104  Site: upper left arm  Mode: electronic  Orthostatic VS  11-16-22 @ 06:45  Lying BP: --/-- HR: --  Sitting BP: 146/85 HR: 92  Standing BP: 138/86 HR: 93  Site: --  Mode: --  
Vital Signs Last 24 Hrs  T(C): 36.3 (11-28-22 @ 06:32), Max: 36.3 (11-28-22 @ 06:32)  T(F): 97.4 (11-28-22 @ 06:32), Max: 97.4 (11-28-22 @ 06:32)      Orthostatic VS  11-28-22 @ 06:32  Lying BP: --/-- HR: --  Sitting BP: --/-- HR: --  Standing BP: 118/73 HR: 105  Site: upper left arm  Mode: electronic  Orthostatic VS  11-27-22 @ 06:28  Lying BP: --/-- HR: --  Sitting BP: 121/72 HR: 84  Standing BP: --/-- HR: --  Site: --  Mode: --  
Vital Signs Last 24 Hrs  T(C): 36.7 (11-04-22 @ 07:47), Max: 36.9 (11-03-22 @ 19:27)  T(F): 98.1 (11-04-22 @ 07:47), Max: 98.4 (11-03-22 @ 19:27)      Orthostatic VS  11-04-22 @ 07:47  Lying BP: --/-- HR: --  Sitting BP: 120/91 HR: 103  Standing BP: 119/72 HR: 107  Site: --  Mode: --  Orthostatic VS  11-03-22 @ 19:27  Lying BP: --/-- HR: --  Sitting BP: 131/79 HR: 101  Standing BP: 133/77 HR: 102  Site: --  Mode: --  Orthostatic VS  11-03-22 @ 08:32  Lying BP: --/-- HR: --  Sitting BP: 108/75 HR: 111  Standing BP: --/-- HR: --  Site: --  Mode: --  Orthostatic VS  11-02-22 @ 21:47  Lying BP: --/-- HR: --  Sitting BP: 139/81 HR: 98  Standing BP: 130/90 HR: 107  Site: upper left arm  Mode: electronic  
Vital Signs Last 24 Hrs  T(C): 36.6 (12-15-22 @ 06:35), Max: 36.6 (12-15-22 @ 06:35)  T(F): 97.9 (12-15-22 @ 06:35), Max: 97.9 (12-15-22 @ 06:35)  HR: --  BP: --  BP(mean): --  RR: --  SpO2: --    Orthostatic VS  12-15-22 @ 06:35  Lying BP: --/-- HR: --  Sitting BP: 109/75 HR: 85  Standing BP: --/-- HR: --  Site: upper left arm  Mode: electronic  Orthostatic VS  12-14-22 @ 06:32  Lying BP: --/-- HR: --  Sitting BP: 119/80 HR: 92  Standing BP: --/-- HR: --  Site: upper left arm  Mode: electronic  
Vital Signs Last 24 Hrs  T(C): 36.7 (11-22-22 @ 06:49), Max: 36.7 (11-22-22 @ 06:49)  T(F): 98 (11-22-22 @ 06:49), Max: 98 (11-22-22 @ 06:49)  HR: --  BP: --  BP(mean): --  RR: --  SpO2: --    Orthostatic VS  11-22-22 @ 06:49  Lying BP: --/-- HR: --  Sitting BP: 124/72 HR: 102  Standing BP: --/-- HR: --  Site: upper left arm  Mode: electronic  Orthostatic VS  11-21-22 @ 05:34  Lying BP: --/-- HR: --  Sitting BP: 103/63 HR: 101  Standing BP: --/-- HR: --  Site: --  Mode: --  
Vital Signs Last 24 Hrs  T(C): 36.7 (11-25-22 @ 06:46), Max: 36.7 (11-25-22 @ 06:46)  T(F): 98 (11-25-22 @ 06:46), Max: 98 (11-25-22 @ 06:46)  HR: --  BP: --  BP(mean): --  RR: --  SpO2: --    Orthostatic VS  11-25-22 @ 06:46  Lying BP: --/-- HR: --  Sitting BP: 110/70 HR: 99  Standing BP: --/-- HR: --  Site: --  Mode: --  Orthostatic VS  11-24-22 @ 08:24  Lying BP: --/-- HR: --  Sitting BP: 102/64 HR: 102  Standing BP: --/-- HR: --  Site: --  Mode: --  
Vital Signs Last 24 Hrs  T(C): 36.7 (11-29-22 @ 06:36), Max: 36.7 (11-29-22 @ 06:36)  T(F): 98.1 (11-29-22 @ 06:36), Max: 98.1 (11-29-22 @ 06:36)  HR: --  BP: --  BP(mean): --  RR: --  SpO2: --    Orthostatic VS  11-29-22 @ 06:36  Lying BP: --/-- HR: --  Sitting BP: 103/63 HR: 102  Standing BP: --/-- HR: --  Site: upper left arm  Mode: electronic  Orthostatic VS  11-28-22 @ 06:32  Lying BP: --/-- HR: --  Sitting BP: --/-- HR: --  Standing BP: 118/73 HR: 105  Site: upper left arm  Mode: electronic  
Vital Signs Last 24 Hrs  T(C): 36.3 (11-09-22 @ 06:26), Max: 36.3 (11-09-22 @ 06:26)  T(F): 97.4 (11-09-22 @ 06:26), Max: 97.4 (11-09-22 @ 06:26)  HR: --  BP: --  BP(mean): --  RR: --  SpO2: --    Orthostatic VS  11-09-22 @ 06:26  Lying BP: --/-- HR: --  Sitting BP: 135/81 HR: 112  Standing BP: --/-- HR: --  Site: upper left arm  Mode: electronic  Orthostatic VS  11-08-22 @ 06:36  Lying BP: --/-- HR: --  Sitting BP: 128/86 HR: 96  Standing BP: --/-- HR: --  Site: upper left arm  Mode: electronic  
Vital Signs Last 24 Hrs  T(C): 36.8 (12-07-22 @ 06:49), Max: 36.8 (12-07-22 @ 06:49)  T(F): 98.3 (12-07-22 @ 06:49), Max: 98.3 (12-07-22 @ 06:49)      Orthostatic VS  12-07-22 @ 06:49  Lying BP: --/-- HR: --  Sitting BP: 115/70 HR: 90  Standing BP: 108/66 HR: 94  Site: --  Mode: --  Orthostatic VS  12-06-22 @ 06:19  Lying BP: --/-- HR: --  Sitting BP: 107/66 HR: 87  Standing BP: --/-- HR: --  Site: --  Mode: --  
Vital Signs Last 24 Hrs  T(C): 36.8 (11-03-22 @ 08:32), Max: 36.8 (11-03-22 @ 08:32)  T(F): 98.2 (11-03-22 @ 08:32), Max: 98.2 (11-03-22 @ 08:32)    Orthostatic VS  11-03-22 @ 08:32  Lying BP: --/-- HR: --  Sitting BP: 108/75 HR: 111  Standing BP: --/-- HR: --  Site: --  Mode: --  Orthostatic VS  11-02-22 @ 21:47  Lying BP: --/-- HR: --  Sitting BP: 139/81 HR: 98  Standing BP: 130/90 HR: 107  Site: upper left arm  Mode: electronic  Orthostatic VS  11-02-22 @ 06:50  Lying BP: --/-- HR: --  Sitting BP: 142/76 HR: 90  Standing BP: 137/81 HR: 100  Site: --  Mode: --  Orthostatic VS  11-01-22 @ 20:26  Lying BP: --/-- HR: --  Sitting BP: 143/77 HR: 99  Standing BP: 149/76 HR: 104  Site: upper left arm  Mode: electronic  
Vital Signs Last 24 Hrs  T(C): 37 (12-13-22 @ 08:32), Max: 37 (12-13-22 @ 08:32)  T(F): 98.6 (12-13-22 @ 08:32), Max: 98.6 (12-13-22 @ 08:32)  HR: --  BP: --  BP(mean): --  RR: --  SpO2: --    Orthostatic VS  12-13-22 @ 08:32  Lying BP: --/-- HR: --  Sitting BP: 113/65 HR: 87  Standing BP: --/-- HR: --  Site: --  Mode: --  Orthostatic VS  12-12-22 @ 06:33  Lying BP: --/-- HR: --  Sitting BP: 131/84 HR: 84  Standing BP: --/-- HR: --  Site: upper left arm  Mode: electronic  
Vital Signs Last 24 Hrs  T(C): 36.8 (12-09-22 @ 06:30), Max: 36.8 (12-09-22 @ 06:30)  T(F): 98.3 (12-09-22 @ 06:30), Max: 98.3 (12-09-22 @ 06:30)  HR: --  BP: --  BP(mean): --  RR: --  SpO2: --    Orthostatic VS  12-09-22 @ 06:30  Lying BP: --/-- HR: --  Sitting BP: 131/80 HR: 88  Standing BP: --/-- HR: --  Site: --  Mode: --  Orthostatic VS  12-08-22 @ 06:28  Lying BP: --/-- HR: --  Sitting BP: 120/75 HR: 88  Standing BP: --/-- HR: --  Site: --  Mode: --  
Vital Signs Last 24 Hrs  T(C): 36.2 (11-18-22 @ 08:37), Max: 36.2 (11-18-22 @ 08:37)  T(F): 97.1 (11-18-22 @ 08:37), Max: 97.1 (11-18-22 @ 08:37)      Orthostatic VS  11-18-22 @ 08:37  Lying BP: --/-- HR: --  Sitting BP: 114/69 HR: 98  Standing BP: --/-- HR: --  Site: --  Mode: --  Orthostatic VS  11-17-22 @ 06:54  Lying BP: --/-- HR: --  Sitting BP: 104/64 HR: 104  Standing BP: 106/68 HR: 104  Site: upper left arm  Mode: electronic  
Vital Signs Last 24 Hrs  T(C): 36.8 (12-02-22 @ 06:41), Max: 36.8 (12-02-22 @ 06:41)  T(F): 98.3 (12-02-22 @ 06:41), Max: 98.3 (12-02-22 @ 06:41)      Orthostatic VS  12-02-22 @ 06:41  Lying BP: --/-- HR: --  Sitting BP: --/-- HR: --  Standing BP: 109/64 HR: 108  Site: upper left arm  Mode: electronic  Orthostatic VS  12-01-22 @ 08:14  Lying BP: --/-- HR: --  Sitting BP: 100/66 HR: 105  Standing BP: --/-- HR: --  Site: --  Mode: --  
Vital Signs Last 24 Hrs  T(C): 36.8 (12-08-22 @ 06:28), Max: 36.8 (12-08-22 @ 06:28)  T(F): 98.3 (12-08-22 @ 06:28), Max: 98.3 (12-08-22 @ 06:28)      Orthostatic VS  12-08-22 @ 06:28  Lying BP: --/-- HR: --  Sitting BP: 120/75 HR: 88  Standing BP: --/-- HR: --  Site: --  Mode: --  Orthostatic VS  12-07-22 @ 06:49  Lying BP: --/-- HR: --  Sitting BP: 115/70 HR: 90  Standing BP: 108/66 HR: 94  Site: --  Mode: --  
Vital Signs Last 24 Hrs  T(C): 36.8 (11-30-22 @ 08:26), Max: 36.8 (11-30-22 @ 08:26)  T(F): 98.2 (11-30-22 @ 08:26), Max: 98.2 (11-30-22 @ 08:26)  HR: 99 (11-30-22 @ 08:26) (99 - 99)  BP: 120/76 (11-30-22 @ 08:26) (120/76 - 120/76)  BP(mean): --  RR: --  SpO2: --    Orthostatic VS  11-29-22 @ 06:36  Lying BP: --/-- HR: --  Sitting BP: 103/63 HR: 102  Standing BP: --/-- HR: --  Site: upper left arm  Mode: electronic  
Vital Signs Last 24 Hrs  T(C): 36.2 (11-02-22 @ 06:50), Max: 37 (11-01-22 @ 20:26)  T(F): 97.1 (11-02-22 @ 06:50), Max: 98.6 (11-01-22 @ 20:26)      Orthostatic VS  11-02-22 @ 06:50  Lying BP: --/-- HR: --  Sitting BP: 142/76 HR: 90  Standing BP: 137/81 HR: 100  Site: --  Mode: --  Orthostatic VS  11-01-22 @ 20:26  Lying BP: --/-- HR: --  Sitting BP: 143/77 HR: 99  Standing BP: 149/76 HR: 104  Site: upper left arm  Mode: electronic  Orthostatic VS  11-01-22 @ 03:42  Lying BP: --/-- HR: --  Sitting BP: 116/77 HR: 110  Standing BP: 116/83 HR: 112  Site: --  Mode: --  
Vital Signs Last 24 Hrs  T(C): 36.8 (11-15-22 @ 06:53), Max: 36.8 (11-15-22 @ 06:53)  T(F): 98.3 (11-15-22 @ 06:53), Max: 98.3 (11-15-22 @ 06:53)  HR: --  BP: --  BP(mean): --  RR: --  SpO2: --    Orthostatic VS  11-15-22 @ 06:53  Lying BP: --/-- HR: --  Sitting BP: 121/77 HR: 102  Standing BP: --/-- HR: --  Site: --  Mode: --  
Vital Signs Last 24 Hrs  T(C): 36.6 (11-10-22 @ 06:53), Max: 36.6 (11-10-22 @ 06:53)  T(F): 97.8 (11-10-22 @ 06:53), Max: 97.8 (11-10-22 @ 06:53)  HR: --  BP: --  BP(mean): --  RR: --  SpO2: --    Orthostatic VS  11-10-22 @ 06:53  Lying BP: --/-- HR: --  Sitting BP: 138/81 HR: 101  Standing BP: --/-- HR: --  Site: upper left arm  Mode: electronic  Orthostatic VS  11-09-22 @ 06:26  Lying BP: --/-- HR: --  Sitting BP: 135/81 HR: 112  Standing BP: --/-- HR: --  Site: upper left arm  Mode: electronic  
Vital Signs Last 24 Hrs  T(C): 36.3 (11-14-22 @ 09:23), Max: 36.3 (11-14-22 @ 09:23)  T(F): 97.4 (11-14-22 @ 09:23), Max: 97.4 (11-14-22 @ 09:23)  HR: 111 (11-14-22 @ 09:23) (111 - 111)  BP: 118/68 (11-14-22 @ 09:23) (118/68 - 118/68)      Orthostatic VS  11-13-22 @ 06:11  Lying BP: --/-- HR: --  Sitting BP: 134/79 HR: 100  Standing BP: 132/75 HR: 105  Site: --  Mode: --  
Vital Signs Last 24 Hrs  T(C): 36.5 (11-08-22 @ 06:36), Max: 36.5 (11-08-22 @ 06:36)  T(F): 97.7 (11-08-22 @ 06:36), Max: 97.7 (11-08-22 @ 06:36)  HR: --  BP: --  BP(mean): --  RR: --  SpO2: --    Orthostatic VS  11-08-22 @ 06:36  Lying BP: --/-- HR: --  Sitting BP: 128/86 HR: 96  Standing BP: --/-- HR: --  Site: upper left arm  Mode: electronic  Orthostatic VS  11-07-22 @ 06:46  Lying BP: --/-- HR: --  Sitting BP: 130/93 HR: 101  Standing BP: --/-- HR: --  Site: --  Mode: --  
Vital Signs Last 24 Hrs  T(C): 36.4 (11-06-22 @ 08:24), Max: 36.4 (11-06-22 @ 06:48)  T(F): 97.6 (11-06-22 @ 08:24), Max: 97.6 (11-06-22 @ 06:48)  HR: --  BP: --  BP(mean): --  RR: --  SpO2: --    Orthostatic VS  11-06-22 @ 08:24  Lying BP: --/-- HR: --  Sitting BP: 132/84 HR: 101  Standing BP: --/-- HR: --  Site: --  Mode: --  Orthostatic VS  11-06-22 @ 06:48  Lying BP: --/-- HR: --  Sitting BP: 132/84 HR: 101  Standing BP: --/-- HR: --  Site: --  Mode: --  Orthostatic VS  11-05-22 @ 06:39  Lying BP: --/-- HR: --  Sitting BP: 130/97 HR: 108  Standing BP: 128/100 HR: 106  Site: --  Mode: --  
Vital Signs Last 24 Hrs  T(C): 36.8 (12-14-22 @ 06:32), Max: 36.8 (12-14-22 @ 06:32)  T(F): 98.2 (12-14-22 @ 06:32), Max: 98.2 (12-14-22 @ 06:32)  HR: --  BP: --  BP(mean): --  RR: --  SpO2: --    Orthostatic VS  12-14-22 @ 06:32  Lying BP: --/-- HR: --  Sitting BP: 119/80 HR: 92  Standing BP: --/-- HR: --  Site: upper left arm  Mode: electronic  Orthostatic VS  12-13-22 @ 08:32  Lying BP: --/-- HR: --  Sitting BP: 113/65 HR: 87  Standing BP: --/-- HR: --  Site: --  Mode: --  
Vital Signs Last 24 Hrs  T(C): 36.2 (12-01-22 @ 08:14), Max: 36.2 (12-01-22 @ 08:14)  T(F): 97.1 (12-01-22 @ 08:14), Max: 97.1 (12-01-22 @ 08:14)  HR: --  BP: --  BP(mean): --  RR: --  SpO2: --    Orthostatic VS  12-01-22 @ 08:14  Lying BP: --/-- HR: --  Sitting BP: 100/66 HR: 105  Standing BP: --/-- HR: --  Site: --  Mode: --  
Vital Signs Last 24 Hrs  T(C): 36.7 (11-07-22 @ 06:46), Max: 36.7 (11-07-22 @ 06:46)  T(F): 98 (11-07-22 @ 06:46), Max: 98 (11-07-22 @ 06:46)  HR: --  BP: --  BP(mean): --  RR: --  SpO2: --    Orthostatic VS  11-07-22 @ 06:46  Lying BP: --/-- HR: --  Sitting BP: 130/93 HR: 101  Standing BP: --/-- HR: --  Site: --  Mode: --  Orthostatic VS  11-06-22 @ 08:24  Lying BP: --/-- HR: --  Sitting BP: 132/84 HR: 101  Standing BP: --/-- HR: --  Site: --  Mode: --  Orthostatic VS  11-06-22 @ 06:48  Lying BP: --/-- HR: --  Sitting BP: 132/84 HR: 101  Standing BP: --/-- HR: --  Site: --  Mode: --  
Vital Signs Last 24 Hrs  T(C): 36.8 (12-06-22 @ 06:19), Max: 36.8 (12-06-22 @ 06:19)  T(F): 98.2 (12-06-22 @ 06:19), Max: 98.2 (12-06-22 @ 06:19)  HR: --  BP: --  BP(mean): --  RR: --  SpO2: --    Orthostatic VS  12-06-22 @ 06:19  Lying BP: --/-- HR: --  Sitting BP: 107/66 HR: 87  Standing BP: --/-- HR: --  Site: --  Mode: --  Orthostatic VS  12-05-22 @ 06:54  Lying BP: --/-- HR: --  Sitting BP: 104/66 HR: 85  Standing BP: 98/64 HR: 90  Site: --  Mode: --  
Vital Signs Last 24 Hrs  T(C): 36.7 (12-12-22 @ 06:33), Max: 36.7 (12-12-22 @ 06:33)  T(F): 98 (12-12-22 @ 06:33), Max: 98 (12-12-22 @ 06:33)  HR: --  BP: --  BP(mean): --  RR: --  SpO2: --    Orthostatic VS  12-12-22 @ 06:33  Lying BP: --/-- HR: --  Sitting BP: 131/84 HR: 84  Standing BP: --/-- HR: --  Site: upper left arm  Mode: electronic

## 2022-12-16 NOTE — BH INPATIENT PSYCHIATRY PROGRESS NOTE - NSBHMSESPABN_PSY_A_CORE
Pressured rate/Increased productivity
Loud volume/Pressured rate/Increased productivity
Pressured rate/Increased productivity
Increased productivity/Other
Pressured rate/Increased productivity
Increased productivity
Pressured rate/Increased productivity
Increased productivity
Increased productivity/Other
Increased productivity/Other
Pressured rate/Increased productivity
Increased productivity/Other
Pressured rate/Increased productivity
Pressured rate/Increased productivity
Loud volume/Pressured rate/Increased productivity
Increased productivity
Pressured rate/Increased productivity
Increased productivity/Other
Increased productivity/Other
Pressured rate/Increased productivity
Increased productivity/Other
Increased productivity/Other
Pressured rate/Increased productivity
Increased productivity
Pressured rate/Increased productivity
Increased productivity
Increased productivity

## 2022-12-16 NOTE — BH INPATIENT PSYCHIATRY PROGRESS NOTE - NSBHATTESTTYPEVISIT_PSY_A_CORE
Attending Only
BIANKA without on-site Attending supervision
Attending Only
BIANKA without on-site Attending supervision
Attending Only
BIANKA without on-site Attending supervision
Attending Only
BIANKA without on-site Attending supervision
Attending Only
Attending with Resident/Fellow/Student
Attending Only

## 2022-12-16 NOTE — BH INPATIENT PSYCHIATRY DISCHARGE NOTE - NSDCMRMEDTOKEN_GEN_ALL_CORE_FT
lithium 300 mg oral capsule: 1 cap(s) orally once a day (at bedtime) (to be taken with lithium 600mg at bedtime)  lithium 600 mg oral capsule: 1 cap(s) orally 2 times a day (take bedtime dose with an additional lithium 300mg at bedtime)

## 2022-12-16 NOTE — BH INPATIENT PSYCHIATRY PROGRESS NOTE - NSTREATMENTCERTY_PSY_ALL_CORE

## 2022-12-16 NOTE — BH INPATIENT PSYCHIATRY PROGRESS NOTE - NSBHROSSYSTEMS_PSY_ALL_CORE
Psychiatric

## 2022-12-16 NOTE — BH INPATIENT PSYCHIATRY PROGRESS NOTE - NSBHCONSULTIPREASON_PSY_A_CORE
other reason

## 2022-12-16 NOTE — BH INPATIENT PSYCHIATRY PROGRESS NOTE - NSBHMSEAFFCONG_PSY_A_CORE
Congruent

## 2022-12-16 NOTE — BH INPATIENT PSYCHIATRY PROGRESS NOTE - NSBHMSELANG_PSY_A_CORE
No abnormalities noted

## 2022-12-16 NOTE — BH INPATIENT PSYCHIATRY PROGRESS NOTE - NSTXPROBMANIC_PSY_ALL_CORE
MANIC SYMPTOMS

## 2022-12-16 NOTE — BH INPATIENT PSYCHIATRY PROGRESS NOTE - NSBHFUPMEDSE_PSY_A_CORE
None known

## 2022-12-16 NOTE — BH INPATIENT PSYCHIATRY PROGRESS NOTE - NSTXDCOTHRDATETRGT_PSY_ALL_CORE
13-Dec-2022
22-Nov-2022
07-Dec-2022
10-Nov-2022
13-Dec-2022
07-Dec-2022
22-Nov-2022
13-Dec-2022
13-Dec-2022
07-Dec-2022
13-Dec-2022
10-Nov-2022
16-Nov-2022
16-Nov-2022
07-Dec-2022
13-Dec-2022
22-Nov-2022
10-Nov-2022
22-Nov-2022
13-Dec-2022
10-Nov-2022
13-Dec-2022
10-Nov-2022
10-Nov-2022
22-Nov-2022
10-Nov-2022
22-Dec-2022
16-Nov-2022
20-Dec-2022
13-Dec-2022
20-Dec-2022

## 2022-12-16 NOTE — BH INPATIENT PSYCHIATRY DISCHARGE NOTE - ATTENDING ATTESTATION STATEMENT
Addended by: RAMYA RAMIREZ on: 11/15/2022 11:04 AM     Modules accepted: Orders     I have personally seen and examined the patient. I have collaborated with and supervised the

## 2022-12-16 NOTE — BH INPATIENT PSYCHIATRY PROGRESS NOTE - NSBHMSEJUDGE_PSY_A_CORE
Poor
Other
Poor
Other
Poor
Other
Poor
Poor
Fair
Poor

## 2022-12-16 NOTE — BH INPATIENT PSYCHIATRY PROGRESS NOTE - NSBHASSESSSUMMFT_PSY_ALL_CORE
Patient is a 62 year old male, domiciled alone, unemployed, , noncaregiver, PPHx with working diagnoses of: Bipolar 1 disorder, MRE manic and depressive disorder NOS, 1 prior psychiatric hospitalization at [facility] (10/23-11/26/2008), denies prior suicide attempts, not in outpt tx, denies hx of violence, denies hx of legal issues/prior arrests, denies substance use/prior withdrawal/DTs, PMH of T2DM, chronic back pain, +family history, +hx treatment response to depakote and lithium brought in by self presenting with multiple medical complaints found to be manic, irritable and disorganized.  He was admitted on a 939 legal status.  He has limited sleep, profuse and pressured verbal production, tangentiality/flight of ideas and an irritable labile dramatic affect.  He is unable to care for self.  He slept (11/2) after we increased depakote er to 1500mg at bedtime.  He was still manic and unable to care for self but with some mild improvement and a steady state depakote level of 65.  We increased depakote to 2000mg at bedtime and risperidone to 3mg at bedtime with some mild improvement.  He was still symptomatic on this regimen and with a depakote level of 81.  We transitioned from risperidone 3 to invega 6 at bedtime to minimize risk of orthostasis while preserving bedtime dosing and titrated paliperidone to 12mg at bedtime He was showing some improved impulse control and relatedness but sleep was poor, he was labile and disorganized. We also thought at times he was psychotic with talk about cameras and so forth but psychosis was a very small part of his presentation.  He continued with little progress so we started lithium 300 twice daily and will reduced depkaote to 1500 at bedtime per his request. He had been started on lasix for pedal edema but we discontinued because of concerns about complicating med regimen while making adjustments to mood stabilizers, etc.   Lithium seemed to be making some observable improvement notably in his sleep.  We tapered paliperidone to discontinuation (last dose Sunday 12/4).  Lithium level the next day wa 0.4 and depakote level was in the 60s.  He seemed to be making progress with lithium so we increased it to 600mg twice daily.  We tapered depakote to discontinutation while titrating lithium because depakote did not appear to be particularly useful.  That is to say, he got better was lithium was titrated and consolidated to monotherapy.  A lithium level on 600 twice daily was 0.5 and we agreed to increase to 600/900.   He continued to show improvement in affect, organization and sleep.  He was somewhat anxious as discharge derik near and his work required paperwork and so forth.     On the day of discharge he had fair to good adls and good relatedness.  He was somewhat anxiously preoccupied with work related paperwork but responded to reassurance.  Moreover, I suspect at baseline he may perseverative about different worries and concerns.  There was no other increased psychomotor activity.  There was no increased goal directed activity or impulsivity or pleasure seeking.  There was no luz grandiosity.  There was no paranoia and he deneid si/hi intents or plans.  His thought proces  was groslsy linear and goal directed but he could digress at times and he was somewhat preoccupied with paperwork but redirectable.  Speech was talkative but not clinically significant over production. No loud volume.  Mood was "okay" and he acknowledged a bit of anxiety about the paperwork.  Affect euthymic by and large after reassurance (paperwork).  Judgment and insight fair.  Impulse control intact.     He was not a management issue on the unit, although he went without sleep for many days and could be intrusive earlier in his hospitalization.  He did not require IM prns or restraints.  His improvement became significant only after lithium was introduced and I would strongly recommend that if he is re-admitted at some point in the future he be given lithium from the start.      He was discharged with a 30 day supply of lithium 600/900 and follow up at [facility].  He had a lithium level of 0.5 on 600mg twice daily.

## 2022-12-16 NOTE — BH INPATIENT PSYCHIATRY PROGRESS NOTE - NSCGISEVERILLNESS_PSY_ALL_CORE
5 = Markedly ill - intrusive symptoms that distinctly impair social/occupational function or cause intrusive levels of distress
4 = Moderately ill – overt symptoms causing noticeable, but modest, functional impairment or distress; symptom level may warrant medication
5 = Markedly ill - intrusive symptoms that distinctly impair social/occupational function or cause intrusive levels of distress
4 = Moderately ill – overt symptoms causing noticeable, but modest, functional impairment or distress; symptom level may warrant medication
4 = Moderately ill – overt symptoms causing noticeable, but modest, functional impairment or distress; symptom level may warrant medication
5 = Markedly ill - intrusive symptoms that distinctly impair social/occupational function or cause intrusive levels of distress

## 2022-12-16 NOTE — BH INPATIENT PSYCHIATRY PROGRESS NOTE - NSBHPROGSUIC_PSY_A_CORE
no

## 2022-12-16 NOTE — BH INPATIENT PSYCHIATRY PROGRESS NOTE - NSTXDIABGOAL_PSY_ALL_CORE
Will identify modifiable risk factors and strategies to counteract them
Will be able to able to describe prescribed diabetic medications and how to properly take these medications
Will identify modifiable risk factors and strategies to counteract them
Will be able to able to describe prescribed diabetic medications and how to properly take these medications

## 2022-12-16 NOTE — BH INPATIENT PSYCHIATRY PROGRESS NOTE - NSBHMSESPEECH_PSY_A_CORE
Abnormal as indicated, otherwise normal...

## 2022-12-16 NOTE — BH INPATIENT PSYCHIATRY PROGRESS NOTE - NSBHANTIPSYCHOTIC_PSY_ALL_CORE
Yes...

## 2022-12-16 NOTE — BH INPATIENT PSYCHIATRY DISCHARGE NOTE - NSDCCPCAREPLAN_GEN_ALL_CORE_FT
PRINCIPAL DISCHARGE DIAGNOSIS  Diagnosis: Bipolar disorder, current episode manic severe with psychotic features  Assessment and Plan of Treatment:

## 2022-12-16 NOTE — BH INPATIENT PSYCHIATRY PROGRESS NOTE - NSTXPAINPROGRES_PSY_ALL_CORE
Improving
No Change
Improving
No Change
Improving
No Change
Improving

## 2022-12-16 NOTE — BH INPATIENT PSYCHIATRY PROGRESS NOTE - NSTXPROBPAIN_PSY_ALL_CORE
PAIN

## 2022-12-16 NOTE — BH INPATIENT PSYCHIATRY PROGRESS NOTE - NSBHFUPINTERVALHXFT_PSY_A_CORE
Staff report compliant with medication, tylenol prns. Slept until 0515 which is good for him.  He is over anxious about paperwork concerns but is redirectable and soothable.  He is pleasant and fairly well related.  He is not an acute danger to himself or others and is being discharged.

## 2022-12-16 NOTE — BH INPATIENT PSYCHIATRY PROGRESS NOTE - NSBHMSEINTELL_PSY_A_CORE
Average

## 2022-12-16 NOTE — BH INPATIENT PSYCHIATRY PROGRESS NOTE - NSBHMSEATTEN_PSY_A_CORE
Impaired

## 2022-12-16 NOTE — BH INPATIENT PSYCHIATRY PROGRESS NOTE - NSBHLEGALSTATUSCHANGE_PSY_ALL_CORE
No
Yes...
No
No
Yes...
Yes...
No
Yes...
No
No
Yes...
No
Yes...
Yes...
No
Yes...
No
Yes...
Yes...

## 2022-12-16 NOTE — BH INPATIENT PSYCHIATRY PROGRESS NOTE - NSBHMSEMUSCLE_PSY_A_CORE
Normal muscle tone/strength

## 2022-12-16 NOTE — BH INPATIENT PSYCHIATRY PROGRESS NOTE - NSTXDCOTHRDATEEST_PSY_ALL_CORE
01-Nov-2022
01-Nov-2022
02-Nov-2022
01-Nov-2022
02-Nov-2022
02-Nov-2022
01-Nov-2022
02-Nov-2022
01-Nov-2022
02-Nov-2022
02-Nov-2021
02-Nov-2022
01-Nov-2022
02-Nov-2022
01-Nov-2022
02-Nov-2022
01-Nov-2022
02-Nov-2021
02-Nov-2022
02-Nov-2022
01-Nov-2022
15-Dec-2022

## 2022-12-16 NOTE — BH INPATIENT PSYCHIATRY PROGRESS NOTE - NSBHMSEBODY_PSY_A_CORE
Average build

## 2022-12-16 NOTE — BH INPATIENT PSYCHIATRY PROGRESS NOTE - NSTXPROBDCOTHR_PSY_ALL_CORE
DISCHARGE ISSUE - OTHER

## 2022-12-16 NOTE — BH INPATIENT PSYCHIATRY PROGRESS NOTE - NSCGIIMPROVESX_PSY_ALL_CORE
3 = Minimally improved - slightly better with little or no clinically meaningful reduction of symptoms.  Represents very little change in basic clinical status, level of care, or functional capacity.
2 = Much improved - notably better with signficant reduction of symptoms; increase in the level of functioning but some symptoms remain
3 = Minimally improved - slightly better with little or no clinically meaningful reduction of symptoms.  Represents very little change in basic clinical status, level of care, or functional capacity.

## 2022-12-16 NOTE — BH INPATIENT PSYCHIATRY PROGRESS NOTE - NSBHMSEEYE_PSY_A_CORE
Poor
Good
Poor
Other
Poor
Other
Poor
Poor
Other
Poor
Other
Poor
Good
Good
Poor
Good

## 2022-12-16 NOTE — BH INPATIENT PSYCHIATRY PROGRESS NOTE - NSTXDCOTHRGOAL_PSY_ALL_CORE
Pt will show a reduction of symptoms and engage meaningfully with writer to identify a safe discharge plan. Pt will comply with recommended tx plan and medications for 5 days and identify 2 benefits for adhering to tx.
Pt will show a reduction of symptoms and engage meaningfully with writer to identify a safe discharge plan. Pt will comply with recommended tx plan and medications for 5 days, identify 2 benefits for adhering to tx.
Pt will show a reduction of symptoms and engage meaningfully with writer to identify a safe discharge plan. Pt will comply with recommended tx plan and medications for 5 days and identify 2 benefits for adhering to tx.
Pt will show a reduction of symptoms and engage meaningfully with writer to identify a safe discharge plan. Pt will comply with recommended tx plan and medications for 5 days and identify 2 benefits for adhering to tx.
Pt will show a reduction of symptoms and engage meaningfully with writer to identify a safe discharge plan. Pt will comply with recommended tx plan and medications for 5 days, identify 2 benefits for adhering to tx.
Pt will show a reduction of symptoms and engage meaningfully with writer to identify a safe discharge plan. Pt will comply with recommended tx plan and medications for 5 days and identify 2 benefits for adhering to tx.
Pt will show a reduction of symptoms and engage meaningfully with writer to identify a safe discharge plan. Pt will comply with recommended tx plan and medications for 5 days, identify 2 benefits for adhering to tx.
Pt will show a reduction of symptoms and engage meaningfully with writer to identify a safe discharge plan. Pt will comply with recommended tx plan and medications for 5 days and identify 2 benefits for adhering to tx.
Pt will show a reduction of symptoms and engage meaningfully with writer to identify a safe discharge plan. Pt will comply with recommended tx plan and medications for 5 days, identify 2 benefits for adhering to tx.
Pt will show a reduction of symptoms and engage meaningfully with writer to identify a safe discharge plan. Pt will comply with recommended tx plan and medications for 5 days, identify 2 benefits for adhering to tx.
Pt will show a reduction of symptoms and engage meaningfully with writer to identify a safe discharge plan. Pt will comply with recommended tx plan and medications for 5 days and identify 2 benefits for adhering to tx.
Pt will show a reduction of symptoms and engage meaningfully with writer to identify a safe discharge plan. Pt will comply with recommended tx plan and medications for 5 days and identify 2 benefits for adhering to tx.
Pt will show a reduction of symptoms and engage meaningfully with writer to identify a safe discharge plan. Pt will comply with recommended tx plan and medications for 5 days, identify 2 benefits for adhering to tx.
Pt will show a reduction of symptoms and engage meaningfully with writer to identify a safe discharge plan. Pt will comply with recommended tx plan and medications for 5 days and identify 2 benefits for adhering to tx.

## 2022-12-16 NOTE — BH INPATIENT PSYCHIATRY PROGRESS NOTE - NSTXPAINDATETRGT_PSY_ALL_CORE
08-Nov-2022
22-Nov-2022
08-Nov-2022
29-Nov-2022
08-Nov-2022
06-Dec-2022
20-Dec-2022
22-Nov-2022
08-Nov-2022
08-Nov-2022
22-Nov-2022
15-Nov-2022
08-Nov-2022
15-Nov-2022
06-Dec-2022
15-Nov-2022
15-Nov-2022
20-Dec-2022
15-Nov-2022
06-Dec-2022
29-Nov-2022
06-Dec-2022
29-Nov-2022
29-Nov-2022
13-Dec-2022
15-Nov-2022
06-Dec-2022
29-Nov-2022
13-Dec-2022
15-Nov-2022
13-Dec-2022
08-Nov-2022
20-Dec-2022
22-Dec-2022

## 2022-12-16 NOTE — BH INPATIENT PSYCHIATRY PROGRESS NOTE - NSBHMSEBEHAV_PSY_A_CORE
Uncooperative/Other
Cooperative/Other
Cooperative
Cooperative/Other
Cooperative
Cooperative/Other
Cooperative
Cooperative/Other
Cooperative
Cooperative/Other

## 2022-12-16 NOTE — BH INPATIENT PSYCHIATRY PROGRESS NOTE - NSBHMSETHTCONTENT_PSY_A_CORE
Delusions/Ideas of reference/Other
Other
Delusions/Ideas of reference/Other
Delusions/Ideas of reference/Other
Other
Other
Delusions/Ideas of reference/Other
Other
Delusions/Ideas of reference
Other
Delusions/Ideas of reference/Other
Delusions/Ideas of reference/Other
Ideas of reference/Other
Other
Delusions/Ideas of reference/Other
Other
Other
Delusions/Ideas of reference/Other
Other
Delusions/Ideas of reference/Other
Other
Unremarkable/Other
Delusions/Ideas of reference
Ideas of reference/Other
Other
Delusions/Ideas of reference/Other
Delusions/Ideas of reference
Delusions/Ideas of reference/Other
Ideas of reference/Other

## 2022-12-16 NOTE — BH INPATIENT PSYCHIATRY PROGRESS NOTE - NSBHMSETHTPROC_PSY_A_CORE
Disorganized/Overinclusive/Circumstantial/Tangential/Flight of ideas/Other
Other
Disorganized/Overinclusive/Circumstantial/Tangential/Flight of ideas/Other
Disorganized/Overinclusive/Tangential/Flight of ideas
Circumstantial/Tangential/Other
Disorganized/Overinclusive/Circumstantial/Tangential/Flight of ideas
Disorganized/Overinclusive/Circumstantial/Tangential/Flight of ideas/Other
Disorganized/Overinclusive/Circumstantial/Tangential/Flight of ideas
Circumstantial/Tangential/Other
Disorganized/Overinclusive/Circumstantial/Tangential/Flight of ideas
Other
Circumstantial/Tangential/Other
Disorganized/Overinclusive/Tangential/Flight of ideas
Disorganized/Overinclusive/Circumstantial/Tangential/Flight of ideas/Other
Disorganized/Overinclusive/Tangential/Flight of ideas
Linear/Other
Circumstantial/Tangential/Other
Disorganized/Overinclusive/Circumstantial/Tangential/Flight of ideas/Other
Disorganized/Overinclusive/Circumstantial/Tangential/Flight of ideas/Other
Circumstantial/Tangential/Other
Disorganized/Overinclusive/Tangential/Flight of ideas
Disorganized/Overinclusive/Circumstantial/Tangential/Flight of ideas/Other
Other
Circumstantial/Tangential/Other
Disorganized/Overinclusive/Tangential/Flight of ideas
Disorganized/Overinclusive/Tangential/Flight of ideas
Disorganized/Overinclusive/Circumstantial/Tangential/Flight of ideas/Other

## 2022-12-16 NOTE — BH INPATIENT PSYCHIATRY PROGRESS NOTE - NSBHPSYCHOLCOGORIENT_PSY_A_CORE
Oriented to time, place, person, situation
Not fully oriented...
Oriented to time, place, person, situation
Not fully oriented...
Oriented to time, place, person, situation
Not fully oriented...
Oriented to time, place, person, situation

## 2022-12-16 NOTE — BH INPATIENT PSYCHIATRY PROGRESS NOTE - OTHER
some mild preoccupation with paperwork He is talkative but productivity is not increased to a clinically significant extent  preoccupied with work related paperwork  Improved euthymic but anxious about paperwork initially "okay"

## 2022-12-16 NOTE — BH INPATIENT PSYCHIATRY PROGRESS NOTE - NSBHMSEREMMEM_PSY_A_CORE
Unable to assess

## 2022-12-16 NOTE — BH INPATIENT PSYCHIATRY PROGRESS NOTE - NSBHATTESTBILLINGAW_PSY_A_CORE
[id number]-Exulfejosc Inpatient care - low complexity - 15 minutes
39252-Jfzghqnjtb Inpatient care - low complexity - 15 minutes
99030-Qlzxayxovn Inpatient care - low complexity - 15 minutes
40758-Wfyujwupng Inpatient care - moderate complexity - 25 minutes
34036-Txafvnqgfx Inpatient care - low complexity - 15 minutes
96224-Vmwlrnoekz Inpatient care - moderate complexity - 25 minutes
59089-Iawaliguzn Inpatient care - moderate complexity - 25 minutes
32775-Wajwfrmrky Inpatient care - moderate complexity - 25 minutes
71633-Doljkhqpfa Inpatient care - moderate complexity - 25 minutes
66511-Beikixfsgn Inpatient care - moderate complexity - 25 minutes
28963-Tsduhvtxzw Inpatient care - moderate complexity - 25 minutes
38259-Dkouvixcim Inpatient care - low complexity - 15 minutes
48618-Paqbupxczp Inpatient care - low complexity - 15 minutes
57274-Krurdzogpv Inpatient care - low complexity - 15 minutes
02540-Voclyyzunq Inpatient care - moderate complexity - 25 minutes
01935-Llkjlbkuoc Inpatient care - low complexity - 15 minutes
37283-Khcwoybgye Inpatient care - low complexity - 15 minutes
92255-Ollenfugmz Inpatient care - moderate complexity - 25 minutes
35612-Oopxgowpob Inpatient care - moderate complexity - 25 minutes
61892-Apntpphepk Inpatient care - low complexity - 15 minutes
64571-Jhwxukgdwm Inpatient care - moderate complexity - 25 minutes
96727-Dtnpeyrtcp Inpatient care - low complexity - 15 minutes
58717-Zlxikqcthg Inpatient care - moderate complexity - 25 minutes
36018-Zkcheqlovy Inpatient care - moderate complexity - 25 minutes
55964-Xydlrjvfvo Inpatient care - moderate complexity - 25 minutes
22790-Grlvqpynmk Inpatient care - low complexity - 15 minutes
50255-Xkleicsvdu Inpatient care - low complexity - 15 minutes
04377-Pqmyddnjdc Inpatient care - moderate complexity - 25 minutes
59757-Blagjmvnrr Inpatient care - low complexity - 15 minutes
92281-Kwjmhqitei Inpatient care - low complexity - 15 minutes
57782-Zteocojmki Inpatient care - low complexity - 15 minutes
06527-Totflsfggq Inpatient care - moderate complexity - 25 minutes
99064-Sijbhbjshx Inpatient care - low complexity - 15 minutes
75997-Nmzhqxazjk Inpatient care - low complexity - 15 minutes

## 2022-12-16 NOTE — BH INPATIENT PSYCHIATRY PROGRESS NOTE - NSBHFUPINTERVALCCFT_PSY_A_CORE
Patient seen, chart reviewed, case discussed with team.  Seen for follow up of artem.
Patient seen, chart reviewed, case discussed with team.  Seen for follow up of artem.
Patient seen, chart reviewed, case discussed with team.  patient seen for follow up of manic symptoms.
Patient seen, chart reviewed, case discussed with team.  Seen for follow up of artem.
Patient seen, chart reviewed, case discussed with team.  patient seen for follow up of manic symptoms.
Patient seen, chart reviewed, case discussed with team.  patient seen for follow up of manic symptoms.
"I had an episode with a patient who was pick pocketing". 
Patient seen, chart reviewed, case discussed with team.  Seen for follow up of artem.
Patient seen, chart reviewed, case discussed with team.  patient seen for follow up of manic symptoms.
Patient does not have complaints, happy with his stay. Reports he is sleeping and eating well. He is happy with his medication
Patient seen for follow up for manic symptoms.
Patient seen, chart reviewed, case discussed with team.  
Patient seen, chart reviewed, case discussed with team.  Seen for follow up of artem.
Patient seen, chart reviewed, case discussed with team.  Patient seen for follow up of artem.
Patient seen, chart reviewed, case discussed with team.  Patient seen for follow up of artem.
Patient seen, chart reviewed, case discussed with team.  patient seen for follow up of manic symptoms.
Patient seen, chart reviewed, case discussed with team.  Seen for follow up of artem.
reported feeling "on and off". 
Patient seen, chart reviewed, case discussed with team.  Seen for follow up of artem.
Patient seen, chart reviewed, case discussed with team.  Patient seen for follow up of artem.
Patient seen for follow up for follow up of manic symptoms.
Patient seen, chart reviewed, case discussed with team.  Seen for follow up of artem.
Patient seen, chart reviewed, case discussed with team.  Seen for follow up of artem.
Patient seen, chart reviewed, case discussed with team.  Seen for discharge day management of artem.
Patient seen, chart reviewed, case discussed with team.  patient seen for follow up of manic symptoms.
Patient seen, chart reviewed, case discussed with team.  Seen for follow up of artem.

## 2022-12-16 NOTE — BH INPATIENT PSYCHIATRY PROGRESS NOTE - NSTXPAINGOAL_PSY_ALL_CORE
Will identify 1 coping skill that distracts from pain
Will learn to utilize relaxation training to manage pain
Will identify 1 coping skill that distracts from pain
Will learn to utilize relaxation training to manage pain
Will identify 1 coping skill that distracts from pain
Will learn to utilize relaxation training to manage pain
Will identify 1 coping skill that distracts from pain
Will learn to utilize relaxation training to manage pain
Will identify 1 coping skill that distracts from pain

## 2022-12-16 NOTE — BH INPATIENT PSYCHIATRY PROGRESS NOTE - NSBHMSEKNOW_PSY_A_CORE
Normal

## 2022-12-16 NOTE — BH INPATIENT PSYCHIATRY PROGRESS NOTE - NSTXPAINDATEEST_PSY_ALL_CORE
01-Nov-2022
13-Dec-2022
15-Dec-2022
01-Nov-2022
01-Nov-2022
13-Dec-2022
01-Nov-2022
13-Dec-2022
01-Nov-2022

## 2022-12-16 NOTE — BH INPATIENT PSYCHIATRY PROGRESS NOTE - NSBHMSEMOVE_PSY_A_CORE
No abnormal movements
No abnormal movements/Other
No abnormal movements
No abnormal movements/Other
No abnormal movements/Other
No abnormal movements
No abnormal movements/Other
Other
Other
No abnormal movements
No abnormal movements/Other
No abnormal movements/Other
Other

## 2022-12-16 NOTE — BH INPATIENT PSYCHIATRY PROGRESS NOTE - NSTXMANICPROGRES_PSY_ALL_CORE
Met - goal discontinued
Improving

## 2022-12-16 NOTE — BH INPATIENT PSYCHIATRY PROGRESS NOTE - LEVEL OF CONSCIOUSNESS
Alert

## 2022-12-16 NOTE — BH INPATIENT PSYCHIATRY PROGRESS NOTE - NSBHMSEINSIGHT_PSY_A_CORE
Poor
Other
Fair
Other
Poor
Other
Poor

## 2022-12-16 NOTE — BH INPATIENT PSYCHIATRY PROGRESS NOTE - NSBHMSEKNOWHOW_PSY_ALL_CORE
Vocabulary

## 2022-12-16 NOTE — BH INPATIENT PSYCHIATRY PROGRESS NOTE - NSTXDIABPROGRES_PSY_ALL_CORE
Improving

## 2022-12-16 NOTE — BH INPATIENT PSYCHIATRY DISCHARGE NOTE - HOSPITAL COURSE
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  He was admitted on a 939 legal status.  He has limited sleep, profuse and pressured verbal production, tangentiality/flight of ideas and an irritable labile dramatic affect.  He was unable to care for self.  He slept (11/2) after we increased depakote er to 1500mg at bedtime.  He was still manic and unable to care for self but with some mild improvement and a steady state depakote level of 65.  We increased depakote to 2000mg at bedtime and risperidone to 3mg at bedtime with some mild improvement.  He was still symptomatic on this regimen and with a depakote level of 81.  We transitioned from risperidone 3 to invega 6 at bedtime to minimize risk of orthostasis while preserving bedtime dosing and titrated paliperidone to 12mg at bedtime He was showing some improved impulse control and relatedness but sleep was poor, he was labile and disorganized. We also thought at times he was psychotic with talk about cameras and so forth but psychosis was a very small part of his presentation.  He continued with little progress so we started lithium 300 twice daily and will reduced depkaote to 1500 at bedtime per his request. He had been started on lasix for pedal edema but we discontinued because of concerns about complicating med regimen while making adjustments to mood stabilizers, etc.   Lithium seemed to be making some observable improvement notably in his sleep.  We tapered paliperidone to discontinuation (last dose Sunday 12/4).  Lithium level the next day wa 0.4 and depakote level was in the 60s.  He seemed to be making progress with lithium so we increased it to 600mg twice daily.  We tapered depakote to discontinutation while titrating lithium because depakote did not appear to be particularly useful.  That is to say, he got better was lithium was titrated and consolidated to monotherapy.  A lithium level on 600 twice daily was 0.5 and we agreed to increase to 600/900.   He continued to show improvement in affect, organization and sleep.  He was somewhat anxious as discharge derik near and his work required paperwork and so forth.     On the day of discharge he had fair to good adls and good relatedness.  He was somewhat anxiously preoccupied with work related paperwork but responded to reassurance.  Moreover, I suspect at baseline he may perseverative about different worries and concerns.  There was no other increased psychomotor activity.  There was no increased goal directed activity or impulsivity or pleasure seeking.  There was no luz grandiosity.  There was no paranoia and he deneid si/hi intents or plans.  His thought proces  was groslsy linear and goal directed but he could digress at times and he was somewhat preoccupied with paperwork but redirectable.  Speech was talkative but not clinically significant over production. No loud volume.  Mood was "okay" and he acknowledged a bit of anxiety about the paperwork.  Affect euthymic by and large after reassurance (paperwork).  Judgment and insight fair.  Impulse control intact.     He was not a management issue on the unit, although he went without sleep for many days and could be intrusive earlier in his hospitalization.  He did not require IM prns or restraints.  His improvement became significant only after lithium was introduced and I would strongly recommend that if he is re-admitted at some point in the future he be given lithium from the start.      He was discharged with a 30 day supply of lithium 600/900 and follow up at [facility].  He had a lithium level of 0.5 on 600mg twice daily.

## 2022-12-16 NOTE — BH INPATIENT PSYCHIATRY PROGRESS NOTE - NSBHMSERELATED_PSY_A_CORE
Poor
Poor
Other
Poor
Poor
Other
Fair
Other
Poor
Other
Poor
Fair
Other
Poor
Other
Poor
Other
Poor
Other
Poor
Poor

## 2022-12-16 NOTE — BH INPATIENT PSYCHIATRY DISCHARGE NOTE - NSBHMETABOLIC_PSY_ALL_CORE_FT
BMI:   HbA1c: A1C with Estimated Average Glucose Result: 6.5 % (12-12-22 @ 08:25)    Glucose: POCT Blood Glucose.: 145 mg/dL (10-31-22 @ 14:58)    BP: --  Lipid Panel: Date/Time: 12-12-22 @ 08:25  Cholesterol, Serum: 123  Direct LDL: --  HDL Cholesterol, Serum: 44  Total Cholesterol/HDL Ration Measurement: --  Triglycerides, Serum: 94   BMI:   HbA1c: A1C with Estimated Average Glucose Result: 6.5 % (12-12-22 @ 08:25)    Glucose: POCT Blood Glucose.: 145 mg/dL (10-31-22 @ 14:58)    BP: --  Lipid Panel: Date/Time: 12-12-22 @ 08:25  Cholesterol, Serum: 123  Direct LDL: --  HDL Cholesterol, Serum: 44  Total Cholesterol/HDL Ration Measurement: --  Triglycerides, Serum: 94        Unmodifiable risk factors  Compared to admission, pt is at significantly lower risk of harm to self but still at chronic risk given unmodifiable higher-risk factors which include: male gender, advancing age, diagnosis of Bipolar Disorder, family history of diagnosis requiring hospitalization.  I also suspect that he has experienced trauma in his lifetime.  He has no history of suicide attempts or violence.  Modifiable risk factors  1.	Status of symptoms of current and past diagnoses   a.	He presented with florid artem, flight of ideas, decreased need for sleep, labile affect, irritability, increased goal directed activity, grandiosity, impulsivity and intrusiveness.  His artem has by and large subsided.  His thought process is much more organized, his sleeping is greatly improved and his affect is more stable less irritable and more euthymic.  There is no increased goal directed activity or grandiosity. He is much less intrusive and has displayed good impulse control.    2.	Status of treatment related factors  a.	He had been out of treatment and has been restarted on lithium, leaves with a prescription for lithium and a referral for outpatient follow up.  He has been compliant and cooperative and participated in groups and acitvities    3.	Status of activating stressors  a.	His manic episode was multifactorial in nature.  Stressors include loneliness, work related stress, stress from his ongoing divorce.  He received individual and group therapy to help his coping skills and gain insight into his stressors    Pt has come to consistently denied any S/I/I/P throughout this hospitalization and will be discharged with follow up.  Pt was provided with pharmacotherapy and individual psychotherapy throughout this hospitalization and upon discharge was instructed to practice the provided safe coping mechanisms and to take prescribed medication only as directed.     Pt was informed of the benefits and risks of current psychiatric medication including but not limited to renal complications and was advised of the side effects of lithium and signs of lithium toxicity.  Pt advised to call 911 if sx worsen, the discussed life-threatening side effects occur, SI/HI develop, or pt becomes acutely dangerous to self or others. Pt stated that he understood these instructions and is agreeable.

## 2022-12-16 NOTE — BH INPATIENT PSYCHIATRY PROGRESS NOTE - NSBHMSEMOOD_PSY_A_CORE
Other
Depressed/Irritable
Other
Irritable
Other

## 2022-12-16 NOTE — BH INPATIENT PSYCHIATRY PROGRESS NOTE - NSTXMANICDATETRGT_PSY_ALL_CORE
08-Nov-2022
22-Dec-2022
08-Nov-2022
29-Nov-2022
05-Dec-2022
12-Dec-2022
15-Nov-2022
22-Nov-2022
15-Nov-2022
12-Dec-2022
05-Dec-2022
19-Dec-2022
22-Nov-2022
12-Dec-2022
29-Nov-2022
08-Nov-2022
15-Nov-2022
05-Dec-2022
22-Nov-2022
08-Nov-2022
29-Nov-2022
15-Nov-2022
19-Dec-2022
05-Dec-2022
05-Dec-2022
08-Nov-2022
15-Nov-2022
08-Nov-2022
12-Dec-2022
08-Nov-2022
15-Dec-2022
15-Nov-2022
15-Nov-2022
19-Dec-2022

## 2022-12-16 NOTE — BH INPATIENT PSYCHIATRY PROGRESS NOTE - NSTXMANICDATEEST_PSY_ALL_CORE
01-Nov-2022
15-Dec-2022
01-Nov-2022

## 2022-12-16 NOTE — BH INPATIENT PSYCHIATRY PROGRESS NOTE - NSTXPROBDIAB_PSY_ALL_CORE
DIABETES MANAGEMENT

## 2022-12-16 NOTE — BH INPATIENT PSYCHIATRY PROGRESS NOTE - NSDCCRITERIA_PSY_ALL_CORE
cgi-i<=2	

## 2022-12-16 NOTE — BH INPATIENT PSYCHIATRY PROGRESS NOTE - NSBHMSEPERCEPT_PSY_A_CORE
No abnormalities
No abnormalities/Other
No abnormalities
No abnormalities/Other
No abnormalities

## 2022-12-16 NOTE — BH INPATIENT PSYCHIATRY PROGRESS NOTE - NSBHMSEHYG_PSY_A_CORE
Fair

## 2022-12-16 NOTE — BH INPATIENT PSYCHIATRY PROGRESS NOTE - NSBHMSEAFFQUAL_PSY_A_CORE
Other
Anxious
Other
Euthymic
Other
Anxious
Other
Other
Euthymic
Other
Other
Anxious/Other
Euthymic/Depressed
Elevated
Euthymic
Euthymic
Other
Depressed/Irritable
Elevated
Other
Euthymic
Other
Other
Depressed/Irritable
Other
Other
Euthymic/Depressed/Elevated/Irritable
Other
Euthymic

## 2022-12-16 NOTE — BH INPATIENT PSYCHIATRY PROGRESS NOTE - NSTXDCOTHRPROGRES_PSY_ALL_CORE
Called pt & left message for her to call this office back to see if could schedule her w/one of the NP's to be seen tomorrow. Also stated that if she would have any worsening of edema w/severe pain, inability to walk, or any life-threatening sx's between now & when this office reopens tomorrow am, recommended that pt go to ER.
Improving
No Change
Improving
No Change
Improving
Improving
No Change
No Change
Improving
No Change
Improving
No Change
Improving
No Change

## 2022-12-21 ENCOUNTER — OUTPATIENT (OUTPATIENT)
Dept: OUTPATIENT SERVICES | Facility: HOSPITAL | Age: 62
LOS: 1 days | Discharge: STILL ADMITTED OR ADMINISTRATIVE | End: 2022-12-21

## 2022-12-21 PROBLEM — E11.9 TYPE 2 DIABETES MELLITUS WITHOUT COMPLICATIONS: Chronic | Status: ACTIVE | Noted: 2022-11-16

## 2022-12-21 PROBLEM — M54.9 DORSALGIA, UNSPECIFIED: Chronic | Status: ACTIVE | Noted: 2022-11-16

## 2022-12-21 LAB
ANION GAP SERPL CALC-SCNC: 10 MMOL/L — SIGNIFICANT CHANGE UP (ref 7–14)
BASOPHILS # BLD AUTO: 0.07 K/UL — SIGNIFICANT CHANGE UP (ref 0–0.2)
BASOPHILS NFR BLD AUTO: 0.5 % — SIGNIFICANT CHANGE UP (ref 0–2)
BUN SERPL-MCNC: 19 MG/DL — SIGNIFICANT CHANGE UP (ref 7–23)
CALCIUM SERPL-MCNC: 9.3 MG/DL — SIGNIFICANT CHANGE UP (ref 8.4–10.5)
CHLORIDE SERPL-SCNC: 104 MMOL/L — SIGNIFICANT CHANGE UP (ref 98–107)
CO2 SERPL-SCNC: 27 MMOL/L — SIGNIFICANT CHANGE UP (ref 22–31)
CREAT SERPL-MCNC: 0.86 MG/DL — SIGNIFICANT CHANGE UP (ref 0.5–1.3)
EGFR: 98 ML/MIN/1.73M2 — SIGNIFICANT CHANGE UP
EOSINOPHIL # BLD AUTO: 0.19 K/UL — SIGNIFICANT CHANGE UP (ref 0–0.5)
EOSINOPHIL NFR BLD AUTO: 1.5 % — SIGNIFICANT CHANGE UP (ref 0–6)
GLUCOSE SERPL-MCNC: 148 MG/DL — HIGH (ref 70–99)
HCT VFR BLD CALC: 38.9 % — LOW (ref 39–50)
HGB BLD-MCNC: 12.6 G/DL — LOW (ref 13–17)
IANC: 9.08 K/UL — HIGH (ref 1.8–7.4)
IMM GRANULOCYTES NFR BLD AUTO: 0.4 % — SIGNIFICANT CHANGE UP (ref 0–0.9)
LITHIUM SERPL-MCNC: 0.7 MMOL/L — SIGNIFICANT CHANGE UP (ref 0.6–1.2)
LYMPHOCYTES # BLD AUTO: 2.63 K/UL — SIGNIFICANT CHANGE UP (ref 1–3.3)
LYMPHOCYTES # BLD AUTO: 20.4 % — SIGNIFICANT CHANGE UP (ref 13–44)
MCHC RBC-ENTMCNC: 30.1 PG — SIGNIFICANT CHANGE UP (ref 27–34)
MCHC RBC-ENTMCNC: 32.4 GM/DL — SIGNIFICANT CHANGE UP (ref 32–36)
MCV RBC AUTO: 93.1 FL — SIGNIFICANT CHANGE UP (ref 80–100)
MONOCYTES # BLD AUTO: 0.9 K/UL — SIGNIFICANT CHANGE UP (ref 0–0.9)
MONOCYTES NFR BLD AUTO: 7 % — SIGNIFICANT CHANGE UP (ref 2–14)
NEUTROPHILS # BLD AUTO: 9.08 K/UL — HIGH (ref 1.8–7.4)
NEUTROPHILS NFR BLD AUTO: 70.2 % — SIGNIFICANT CHANGE UP (ref 43–77)
NRBC # BLD: 0 /100 WBCS — SIGNIFICANT CHANGE UP (ref 0–0)
NRBC # FLD: 0 K/UL — SIGNIFICANT CHANGE UP (ref 0–0)
PLATELET # BLD AUTO: 278 K/UL — SIGNIFICANT CHANGE UP (ref 150–400)
POTASSIUM SERPL-MCNC: 4.4 MMOL/L — SIGNIFICANT CHANGE UP (ref 3.5–5.3)
POTASSIUM SERPL-SCNC: 4.4 MMOL/L — SIGNIFICANT CHANGE UP (ref 3.5–5.3)
RBC # BLD: 4.18 M/UL — LOW (ref 4.2–5.8)
RBC # FLD: 14.1 % — SIGNIFICANT CHANGE UP (ref 10.3–14.5)
SODIUM SERPL-SCNC: 141 MMOL/L — SIGNIFICANT CHANGE UP (ref 135–145)
WBC # BLD: 12.92 K/UL — HIGH (ref 3.8–10.5)
WBC # FLD AUTO: 12.92 K/UL — HIGH (ref 3.8–10.5)

## 2022-12-22 ENCOUNTER — APPOINTMENT (OUTPATIENT)
Dept: INTERNAL MEDICINE | Facility: CLINIC | Age: 62
End: 2022-12-22
Payer: COMMERCIAL

## 2022-12-22 VITALS
HEART RATE: 90 BPM | OXYGEN SATURATION: 97 % | TEMPERATURE: 98.2 F | DIASTOLIC BLOOD PRESSURE: 66 MMHG | BODY MASS INDEX: 30.12 KG/M2 | WEIGHT: 219 LBS | RESPIRATION RATE: 14 BRPM | SYSTOLIC BLOOD PRESSURE: 105 MMHG

## 2022-12-22 DIAGNOSIS — M25.50 PAIN IN UNSPECIFIED JOINT: ICD-10-CM

## 2022-12-22 PROCEDURE — 99204 OFFICE O/P NEW MOD 45 MIN: CPT

## 2022-12-22 RX ORDER — ERGOCALCIFEROL 1.25 MG/1
1.25 MG CAPSULE, LIQUID FILLED ORAL
Qty: 6 | Refills: 0 | Status: DISCONTINUED | COMMUNITY
Start: 2017-02-21 | End: 2022-12-22

## 2022-12-22 RX ORDER — METFORMIN HYDROCHLORIDE 500 MG/1
500 TABLET, COATED ORAL
Qty: 60 | Refills: 3 | Status: DISCONTINUED | COMMUNITY
Start: 2018-12-13 | End: 2022-12-22

## 2022-12-22 NOTE — HISTORY OF PRESENT ILLNESS
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

## 2022-12-22 NOTE — PHYSICAL EXAM
[Normal] : soft, non-tender, non-distended, no masses palpated, no HSM and normal bowel sounds [Alert and Oriented x3] : oriented to person, place, and time

## 2022-12-22 NOTE — ASSESSMENT
[FreeTextEntry1] : 62 Y OLD MALE S/P D/C FROM [facility] 1-2 WEEKS AGO = F/U PSYCH ;TOLD PT I CAN NOT MAKE CHANGES TO HIS PSYCH MEDS \par DM = RELEASE RECORDS ;HAD LABS RECENTLY ,ON NO MEDS

## 2022-12-23 NOTE — SOCIAL WORK POST DISCHARGE FOLLOW UP NOTE - NSBHSWFOLLOWUP_PSY_ALL_CORE_FT
PT connected with [facility] intake and made appointment with [facility]. Patient called SW today and expressed stressors currently as his job told him he would be receiving his short term disability payroll next Wednesday. Patient was able to verbalize feeling overwhelmed but that he felt safe and did not want to return to the hospital. He reported that he had enough money for food until next week and that he is in contact with his coworker Baldemar Yeboah who has been helping him out. He reports his plan for the weekend to be to stay at home and order out to maintain in community and minimize stressors. Patient denied SI/HI.

## 2023-01-20 DIAGNOSIS — F31.2 BIPOLAR DISORDER, CURRENT EPISODE MANIC SEVERE WITH PSYCHOTIC FEATURES: ICD-10-CM

## 2023-02-23 ENCOUNTER — APPOINTMENT (OUTPATIENT)
Dept: INTERNAL MEDICINE | Facility: CLINIC | Age: 63
End: 2023-02-23
Payer: COMMERCIAL

## 2023-02-23 VITALS
WEIGHT: 214 LBS | OXYGEN SATURATION: 100 % | TEMPERATURE: 97.3 F | HEIGHT: 71.5 IN | DIASTOLIC BLOOD PRESSURE: 76 MMHG | SYSTOLIC BLOOD PRESSURE: 112 MMHG | RESPIRATION RATE: 14 BRPM | HEART RATE: 88 BPM | BODY MASS INDEX: 29.31 KG/M2

## 2023-02-23 DIAGNOSIS — E11.69 TYPE 2 DIABETES MELLITUS WITH OTHER SPECIFIED COMPLICATION: ICD-10-CM

## 2023-02-23 DIAGNOSIS — E66.9 TYPE 2 DIABETES MELLITUS WITH OTHER SPECIFIED COMPLICATION: ICD-10-CM

## 2023-02-23 DIAGNOSIS — F41.9 ANXIETY DISORDER, UNSPECIFIED: ICD-10-CM

## 2023-02-23 PROCEDURE — 99214 OFFICE O/P EST MOD 30 MIN: CPT

## 2023-02-23 NOTE — HISTORY OF PRESENT ILLNESS
[de-identified] : COMES FOR F/U \par SEEN BY PSYCHOLOGY AND PSYCH REGULARLY ON TAPERING LITHIUM  AND NO OTHER MEDS \par FEELS FINE

## 2023-02-23 NOTE — ASSESSMENT
[FreeTextEntry1] : 62 Y OLD MALE WITH PMX OF DM OFF MEDS= LABS \par ANGELES= F/U PSYCH ON TAPERING LITHIUM \par RTO 3 M OR PRN

## 2023-02-23 NOTE — PHYSICAL EXAM
[No Acute Distress] : no acute distress [Normal] : normal [Soft, Nontender] : the abdomen was soft and nontender [No Mass] : no masses were palpated [No HSM] : no hepatosplenomegaly noted [No CVA Tenderness] : no CVA  tenderness [No Rash] : no rash [Coordination Grossly Intact] : coordination grossly intact [Alert and Oriented x3] : oriented to person, place, and time

## 2023-03-11 LAB
25(OH)D3 SERPL-MCNC: 15.7 NG/ML
ALBUMIN SERPL ELPH-MCNC: 4.8 G/DL
ALP BLD-CCNC: 131 U/L
ALT SERPL-CCNC: 21 U/L
ANION GAP SERPL CALC-SCNC: 13 MMOL/L
AST SERPL-CCNC: 22 U/L
BASOPHILS # BLD AUTO: 0.07 K/UL
BASOPHILS NFR BLD AUTO: 0.8 %
BILIRUB SERPL-MCNC: 0.5 MG/DL
BUN SERPL-MCNC: 18 MG/DL
CALCIUM SERPL-MCNC: 10.4 MG/DL
CHLORIDE SERPL-SCNC: 101 MMOL/L
CHOLEST SERPL-MCNC: 237 MG/DL
CO2 SERPL-SCNC: 29 MMOL/L
CREAT SERPL-MCNC: 1.06 MG/DL
EGFR: 79 ML/MIN/1.73M2
EOSINOPHIL # BLD AUTO: 0.06 K/UL
EOSINOPHIL NFR BLD AUTO: 0.7 %
ESTIMATED AVERAGE GLUCOSE: 134 MG/DL
GLUCOSE SERPL-MCNC: 142 MG/DL
HBA1C MFR BLD HPLC: 6.3 %
HCT VFR BLD CALC: 51.7 %
HDLC SERPL-MCNC: 46 MG/DL
HGB BLD-MCNC: 16.4 G/DL
IMM GRANULOCYTES NFR BLD AUTO: 0.2 %
LDLC SERPL CALC-MCNC: 159 MG/DL
LYMPHOCYTES # BLD AUTO: 2 K/UL
LYMPHOCYTES NFR BLD AUTO: 23.7 %
MAN DIFF?: NORMAL
MCHC RBC-ENTMCNC: 30.8 PG
MCHC RBC-ENTMCNC: 31.7 GM/DL
MCV RBC AUTO: 97.2 FL
MONOCYTES # BLD AUTO: 0.63 K/UL
MONOCYTES NFR BLD AUTO: 7.5 %
NEUTROPHILS # BLD AUTO: 5.66 K/UL
NEUTROPHILS NFR BLD AUTO: 67.1 %
NONHDLC SERPL-MCNC: 192 MG/DL
PLATELET # BLD AUTO: 274 K/UL
POTASSIUM SERPL-SCNC: 4.3 MMOL/L
PROT SERPL-MCNC: 8.2 G/DL
RBC # BLD: 5.32 M/UL
RBC # FLD: 13.2 %
SODIUM SERPL-SCNC: 143 MMOL/L
TRIGL SERPL-MCNC: 164 MG/DL
TSH SERPL-ACNC: 1.37 UIU/ML
VIT B12 SERPL-MCNC: 347 PG/ML
WBC # FLD AUTO: 8.44 K/UL

## 2023-03-14 LAB
PSA FREE FLD-MCNC: 22 %
PSA FREE SERPL-MCNC: 0.28 NG/ML
PSA SERPL-MCNC: 1.26 NG/ML

## 2023-10-16 NOTE — ED BEHAVIORAL HEALTH ASSESSMENT NOTE - NSACTIVEVENT_PSY_ALL_CORE
No
Triggering events leading to humiliation, shame, and/or despair (e.g., Loss of relationship, financial or health status) (real or anticipated)/Chronic pain or other acute medical condition